# Patient Record
Sex: MALE | Race: ASIAN | NOT HISPANIC OR LATINO | Employment: FULL TIME | URBAN - METROPOLITAN AREA
[De-identification: names, ages, dates, MRNs, and addresses within clinical notes are randomized per-mention and may not be internally consistent; named-entity substitution may affect disease eponyms.]

---

## 2019-02-14 ENCOUNTER — OFFICE VISIT (OUTPATIENT)
Dept: FAMILY MEDICINE | Facility: CLINIC | Age: 44
End: 2019-02-14
Payer: COMMERCIAL

## 2019-02-14 VITALS
HEIGHT: 71 IN | WEIGHT: 191 LBS | BODY MASS INDEX: 26.74 KG/M2 | SYSTOLIC BLOOD PRESSURE: 123 MMHG | DIASTOLIC BLOOD PRESSURE: 79 MMHG | HEART RATE: 88 BPM

## 2019-02-14 DIAGNOSIS — L65.9 ALOPECIA: ICD-10-CM

## 2019-02-14 DIAGNOSIS — R68.82 LOW LIBIDO: ICD-10-CM

## 2019-02-14 DIAGNOSIS — R53.83 FATIGUE, UNSPECIFIED TYPE: Primary | ICD-10-CM

## 2019-02-14 DIAGNOSIS — E53.8 VITAMIN B12 DEFICIENCY: ICD-10-CM

## 2019-02-14 DIAGNOSIS — Z13.220 SCREENING FOR LIPID DISORDERS: ICD-10-CM

## 2019-02-14 PROCEDURE — 99203 PR OFFICE/OUTPT VISIT, NEW, LEVL III, 30-44 MIN: ICD-10-PCS | Mod: ,,, | Performed by: INTERNAL MEDICINE

## 2019-02-14 PROCEDURE — 99203 OFFICE O/P NEW LOW 30 MIN: CPT | Mod: ,,, | Performed by: INTERNAL MEDICINE

## 2019-02-14 RX ORDER — FINASTERIDE 1 MG/1
1 TABLET, FILM COATED ORAL DAILY
Qty: 90 TABLET | Refills: 1 | Status: SHIPPED | OUTPATIENT
Start: 2019-02-14 | End: 2020-02-17 | Stop reason: SDUPTHER

## 2019-02-14 RX ORDER — CYANOCOBALAMIN 1000 UG/ML
1000 INJECTION, SOLUTION INTRAMUSCULAR; SUBCUTANEOUS
Qty: 10 ML | Refills: 1 | Status: SHIPPED | OUTPATIENT
Start: 2019-02-14 | End: 2019-10-02 | Stop reason: SDUPTHER

## 2019-02-14 NOTE — PROGRESS NOTES
Subjective:       Patient ID: Josef Sage is a 43 y.o. male.    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 He has moved from [place] area. He is in administrative role as a Physician and looks forward to found a new opportunity locally in [facility].     Thus far he has not been diagnosed with any major medical issues.    He has been feeling somewhat fatigued and tired for the last few years or so. He did go to some social and personal stressors with one of the family members (cousin) having cancer. Perhaps once a year or so, he takes a vitamin B-12 shot which helps him perk -up.    He also has been experiencing male pattern hair loss which probably comes from his mother's side of family. He wants to give Propecia a trial for hair gain.    He is also concerned about testosterone levels given that he has a somewhat low libido. He is able to get erections. Somewhat less desired and drive. Slight irritability.    No significant weight gain or weight loss. No joint pains arthralgias or myalgias. No rash. No history of major illness in past.    He immigrated to [place] at the age of 7 with his parents and possibly had a positive PPD (BCG vaccination in [place]). He probably had a prophylactic treatment for a certain period of time, I assume with INH and B-6.    Does not experience any fever or has any lymphadenopathy. No hernia or hydrocele. No varicoceles.    He has not had a complete physical and is also interested in checking his lipid panel. One of his cousins had colon cancer as mentioned above and is wondering whether he might be eligible for a colonoscopy. Several years ago he had colonoscopy for some other reasons which was unremarkable.          Fatigue   This is a chronic problem. The current episode started more than 1 year ago. The problem occurs intermittently. The problem has been waxing and waning. Associated symptoms include  fatigue. Pertinent negatives include no abdominal pain, arthralgias, chest pain, chills, congestion, coughing, fever, numbness or rash. The symptoms are aggravated by exertion. Treatments tried: Vitamin B12 Inj Gives relief. The treatment provided mild relief.       Past Medical History:   Diagnosis Date    Bronchitis     Wheezes      Social History     Socioeconomic History    Marital status: Single     Spouse name: Not on file    Number of children: 0    Years of education: Not on file    Highest education level: Not on file   Social Needs    Financial resource strain: Not on file    Food insecurity - worry: Not on file    Food insecurity - inability: Not on file    Transportation needs - medical: Not on file    Transportation needs - non-medical: Not on file   Occupational History    Occupation: Internal medicine physician      Comment: Academics   Tobacco Use    Smoking status: Never Smoker    Smokeless tobacco: Never Used   Substance and Sexual Activity    Alcohol use: Yes     Alcohol/week: 0.6 oz     Types: 1 Glasses of wine per week     Comment: once a week    Drug use: No    Sexual activity: Yes     Partners: Female     Comment: Not    Other Topics Concern    Not on file   Social History Narrative    He works as a  in a healthcare setting in [facility]. His family lives in [facility] and he is considering an opportunity to go to [facility].     Past Surgical History:   Procedure Laterality Date    MOUTH SURGERY      None       Family History   Problem Relation Age of Onset    Hypertension Mother     Hypertension Father        Review of Systems   Constitutional: Positive for fatigue. Negative for activity change, appetite change, chills, fever and unexpected weight change.   HENT: Negative for congestion, postnasal drip, sneezing and trouble swallowing.    Eyes: Negative for pain, itching and visual disturbance.   Respiratory:  "Negative for cough, chest tightness and shortness of breath.    Cardiovascular: Negative for chest pain, palpitations and leg swelling.   Gastrointestinal: Negative for abdominal distention, abdominal pain, blood in stool, constipation and diarrhea.   Endocrine: Negative for cold intolerance, heat intolerance, polydipsia, polyphagia and polyuria.        Somewhat low libido.   Genitourinary: Negative for dysuria, flank pain, hematuria and scrotal swelling.   Musculoskeletal: Negative for arthralgias, back pain and gait problem.   Skin: Negative for pallor, rash and wound.        Male pattern hair baldness   Allergic/Immunologic: Negative for environmental allergies, food allergies and immunocompromised state.   Neurological: Negative for dizziness, seizures, speech difficulty, light-headedness and numbness.   Hematological: Negative for adenopathy. Does not bruise/bleed easily.   Psychiatric/Behavioral: Negative for agitation, behavioral problems, confusion and sleep disturbance. The patient is not nervous/anxious.          Objective:      Blood pressure 123/79, pulse 88, height 5' 11" (1.803 m), weight 86.6 kg (191 lb). Body mass index is 26.64 kg/m².  Physical Exam   Constitutional: He is oriented to person, place, and time. He appears well-developed and well-nourished. No distress.   HENT:   Head: Normocephalic and atraumatic.   Nose: Nose normal.   Mouth/Throat: Oropharynx is clear and moist. No oropharyngeal exudate.   Eyes: Conjunctivae and EOM are normal.   Neck: Normal range of motion. Neck supple. No JVD present. No tracheal deviation present. No thyromegaly present.   Cardiovascular: Normal rate, regular rhythm and normal heart sounds. Exam reveals no gallop and no friction rub.   No murmur heard.  Pulmonary/Chest: Effort normal and breath sounds normal. No respiratory distress. He has no wheezes. He has no rales.   Abdominal: Soft. Bowel sounds are normal. He exhibits no distension. There is no tenderness. "   Musculoskeletal: Normal range of motion.   Neurological: He is alert and oriented to person, place, and time. He has normal reflexes.   Skin: Skin is warm and dry. He is not diaphoretic.   Male pattern receding hair line.   Psychiatric: He has a normal mood and affect.   Nursing note and vitals reviewed.        Assessment:       1. Fatigue, unspecified type    2. Vitamin B12 deficiency    3. Low libido    4. Screening for lipid disorders    5. Alopecia           No visits with results within 3 Month(s) from this visit.   Latest known visit with results is:   Admission on 02/25/2016, Discharged on 02/26/2016   Component Date Value Ref Range Status    WBC 02/25/2016 17.29* 3.90 - 12.70 K/uL Final    RBC 02/25/2016 5.89  4.60 - 6.20 M/uL Final    Hemoglobin 02/25/2016 17.4  14.0 - 18.0 g/dL Final    Hematocrit 02/25/2016 49.1  40.0 - 54.0 % Final    MCV 02/25/2016 83  82 - 98 fL Final    MCH 02/25/2016 29.5  27.0 - 31.0 pg Final    MCHC 02/25/2016 35.4  32.0 - 36.0 % Final    RDW 02/25/2016 12.7  11.5 - 14.5 % Final    Platelets 02/25/2016 247  150 - 350 K/uL Final    MPV 02/25/2016 10.0  9.2 - 12.9 fL Final    Gran # (ANC) 02/25/2016 16.3* 1.8 - 7.7 K/uL Final    Lymph # 02/25/2016 0.8* 1.0 - 4.8 K/uL Final    Mono # 02/25/2016 0.1* 0.3 - 1.0 K/uL Final    Eos # 02/25/2016 0.0  0.0 - 0.5 K/uL Final    Baso # 02/25/2016 0.01  0.00 - 0.20 K/uL Final    Gran% 02/25/2016 94.4* 38.0 - 73.0 % Final    Lymph% 02/25/2016 4.5* 18.0 - 48.0 % Final    Mono% 02/25/2016 0.6* 4.0 - 15.0 % Final    Eosinophil% 02/25/2016 0.1  0.0 - 8.0 % Final    Basophil% 02/25/2016 0.1  0.0 - 1.9 % Final    Differential Method 02/25/2016 Automated   Final    Sodium 02/25/2016 139  136 - 145 mmol/L Final    Potassium 02/25/2016 4.5  3.5 - 5.1 mmol/L Final    Chloride 02/25/2016 103  95 - 110 mmol/L Final    CO2 02/25/2016 29  23 - 29 mmol/L Final    Glucose 02/25/2016 117* 70 - 110 mg/dL Final    BUN, Bld 02/25/2016 13   "6 - 20 mg/dL Final    Creatinine 02/25/2016 1.0  0.5 - 1.4 mg/dL Final    Calcium 02/25/2016 10.3  8.7 - 10.5 mg/dL Final    Anion Gap 02/25/2016 7* 8 - 16 mmol/L Final    eGFR if African American 02/25/2016 >60.0  >60 mL/min/1.73 m^2 Final    eGFR if non African American 02/25/2016 >60.0  >60 mL/min/1.73 m^2 Final    D-Dimer 02/25/2016 <0.19  <0.50 mg/L FEU Final         Plan:           Fatigue, unspecified type  -     Comprehensive metabolic panel; Future; Expected date: 02/14/2019  -     CBC auto differential; Future; Expected date: 02/14/2019    Vitamin B12 deficiency  -     Methylmalonic acid, serum; Future; Expected date: 02/14/2019  -     cyanocobalamin 1,000 mcg/mL injection; Inject 1 mL (1,000 mcg total) into the muscle every 28 days.  Dispense: 10 mL; Refill: 1  -     syringe with needle (SYRINGE 3CC/25GX1") 3 mL 25 gauge x 1" Syrg; 1 each by Misc.(Non-Drug; Combo Route) route every 30 days.  Dispense: 5 Syringe; Refill: 1    Low libido  -     Testosterone, Total & Free & Sex Hormone; Future; Expected date: 02/14/2019    Screening for lipid disorders  -     Lipid panel; Future; Expected date: 02/14/2019    Alopecia  -     PROPECIA 1 mg tablet; Take 1 tablet (1 mg total) by mouth once daily.  Dispense: 90 tablet; Refill: 1      Pt's medical issues have been reviewed. Basic tests including CBC, chemistry, TSH will be ordered for fatigue.    Check total and free testosterone for low libido.    I'm okay with trial of Propecia for male pattern alopecia.    I don't have any objection for him to take vitamin B-12 on empirical basis for fatigue which seems to have happened in past. Would like to check the equal and methylmalonic acid levels.    Cousin brother had colon cancer and will look into screening colonoscopy guidelines.    Between 3-6 months for annual physical complete.    The patient is asked to make an attempt to improve diet and exercise patterns to aid in medical management of this " "problem.    Advised Mr. Sage about age and season appropriate immunizations/ cancer screenings.  Also seasonal influenza vaccine, update on tetanus diphtheria vaccination every 10 years.          Current Outpatient Medications:     albuterol 90 mcg/actuation inhaler, Inhale 1-2 puffs into the lungs every 6 (six) hours as needed for Wheezing., Disp: 1 Inhaler, Rfl: 0    cyanocobalamin 1,000 mcg/mL injection, Inject 1 mL (1,000 mcg total) into the muscle every 28 days., Disp: 10 mL, Rfl: 1    PROPECIA 1 mg tablet, Take 1 tablet (1 mg total) by mouth once daily., Disp: 90 tablet, Rfl: 1    syringe with needle (SYRINGE 3CC/25GX1") 3 mL 25 gauge x 1" Syrg, 1 each by Misc.(Non-Drug; Combo Route) route every 30 days., Disp: 5 Syringe, Rfl: 1  "

## 2019-02-14 NOTE — PATIENT INSTRUCTIONS
Vitamin B-12  Does this test have other names?  VB12, serum cobalamin  What is this test?  This test measures the level of vitamin B-12 in your blood. You need this vitamin to make red blood cells and for your nervous system to function as it should.  You get vitamin B-12 from eating foods that come from animals, such as meat, eggs, and dairy products. Vitamin B-12 is also added to some cereals. You can also take this vitamin as a supplement in pill form.  Why do I need this test?  You may need this test if your healthcare provider suspects that your vitamin B-12 level is low. A low level of vitamin B-12 is called vitamin B-12 deficiency. You are more likely to have vitamin deficiency if you are an older adult, have a digestive disorder called malabsorption, have had gastrointestinal surgery, or eat a vegan diet. Women who are pregnant or breastfeeding and eat a vegetarian-type diet are at high risk for this deficiency.  You may also need this test if you've been diagnosed with or your healthcare provider suspects a disease called pernicious anemia. Pernicious anemia affects the lining of your stomach and makes it hard to absorb vitamin B-12.  These are common symptoms of vitamin B-12 deficiency:  · Fatigue  · Weakness  · Weight loss  · Tingling or numbness of the hands and feet  · Constipation  · Poor balance  · Confusion  · Depression  · Memory loss  · Soreness of the mouth or tongue  What other tests might I have along with this test?  Your healthcare provider may order other tests to help find out the cause of your vitamin B-12 deficiency. These tests may include:  · Complete blood count  · Peripheral blood smear, which involves looking at your blood cells under a microscope  · Folic acid level. This vitamin is also important for red blood cell production.  What do my test results mean?  Many things may affect your lab test results. These include the method each lab uses to do the test. Even if your test  results are different from the normal value, you may not have a problem. To learn what the results mean for you, talk with your healthcare provider.  Vitamin B-12 is measured in picograms per milliliter (pg/mL). Normal results are:  · 200 to 835 pg/mL for adults  · 160 to 1,300 pg/mL for newborns  If your results are low, you may have:  · Pernicious anemia  · Malabsorption from inflammatory bowel disease or other causes  · Poor absorption because of surgery  · Tapeworm infection  · Too little intake of animal protein  · Folic acid deficiency  · Iron deficiency  If your levels are high, you may have:  · Liver or kidney disease  · Diabetes  · Obesity  · White blood cell cancer  High levels may also mean that you have chronic obstructive pulmonary disease , congestive heart failure, or a thickening of the blood called polycythemia vera.  How is this test done?  The test requires a blood sample, which is drawn through a needle from a vein in your arm.  Does this test pose any risks?  Taking a blood sample with a needle carries risks that include bleeding, infection, bruising, or feeling dizzy. When the needle pricks your arm, you may feel a slight stinging sensation or pain. Afterward, the site may be slightly sore.  What might affect my test results?  Certain conditions may affect your test results. These include:  · Pregnancy  · Recent blood transfusions  · Smoking  Medicines in general may also affect your results. Specific medicines include supplements of vitamin A or C and birth control pills.  How do I get ready for this test?  You must fast before this test. You may be able to drink water, but you should not eat or drink anything else after midnight on the night before the test. If you are not having the test in the morning, ask your healthcare provider how long you need to fast before the test. You should not have a vitamin B-12 injection before the test. Also be sure your provider knows about all medicines,  herbs, vitamins, and supplements you are taking. This includes medicines that don't need a prescription and any illicit drugs you may use.  © 6227-4620 [employer]. [address] [place] [address], [place] [address]. All rights reserved. This information is not intended as a substitute for professional medical care. Always follow your healthcare professional's instructions.

## 2019-05-20 PROBLEM — Z13.220 SCREENING FOR LIPID DISORDERS: Status: RESOLVED | Noted: 2019-02-14 | Resolved: 2019-05-20

## 2019-09-16 ENCOUNTER — TELEPHONE (OUTPATIENT)
Dept: FAMILY MEDICINE | Facility: CLINIC | Age: 44
End: 2019-09-16

## 2019-09-16 NOTE — TELEPHONE ENCOUNTER
----- Message from Neel Maldonado MD sent at 9/15/2019  3:15 PM CDT -----  The results are within acceptable range.  Please keep regular follow up.

## 2019-09-17 ENCOUNTER — OFFICE VISIT (OUTPATIENT)
Dept: FAMILY MEDICINE | Facility: CLINIC | Age: 44
End: 2019-09-17
Payer: COMMERCIAL

## 2019-09-17 VITALS
HEART RATE: 94 BPM | SYSTOLIC BLOOD PRESSURE: 122 MMHG | DIASTOLIC BLOOD PRESSURE: 85 MMHG | HEIGHT: 71 IN | BODY MASS INDEX: 27.44 KG/M2 | WEIGHT: 196 LBS

## 2019-09-17 DIAGNOSIS — Z23 FLU VACCINE NEED: ICD-10-CM

## 2019-09-17 DIAGNOSIS — Z00.00 ANNUAL PHYSICAL EXAM: Primary | ICD-10-CM

## 2019-09-17 DIAGNOSIS — R63.5 WEIGHT GAIN: ICD-10-CM

## 2019-09-17 DIAGNOSIS — R19.7 DIARRHEA, UNSPECIFIED TYPE: ICD-10-CM

## 2019-09-17 DIAGNOSIS — E78.2 MIXED HYPERLIPIDEMIA: ICD-10-CM

## 2019-09-17 PROCEDURE — 99999 PR PBB SHADOW E&M-EST. PATIENT-LVL IV: CPT | Mod: PBBFAC,,, | Performed by: INTERNAL MEDICINE

## 2019-09-17 PROCEDURE — 99396 PR PREVENTIVE VISIT,EST,40-64: ICD-10-PCS | Mod: 25,S$GLB,, | Performed by: INTERNAL MEDICINE

## 2019-09-17 PROCEDURE — 90471 IMMUNIZATION ADMIN: CPT | Mod: S$GLB,,, | Performed by: INTERNAL MEDICINE

## 2019-09-17 PROCEDURE — 90471 FLU VACCINE (QUAD) GREATER THAN OR EQUAL TO 3YO PRESERVATIVE FREE IM: ICD-10-PCS | Mod: S$GLB,,, | Performed by: INTERNAL MEDICINE

## 2019-09-17 PROCEDURE — 99213 PR OFFICE/OUTPT VISIT, EST, LEVL III, 20-29 MIN: ICD-10-PCS | Mod: 25,S$GLB,, | Performed by: INTERNAL MEDICINE

## 2019-09-17 PROCEDURE — 90686 FLU VACCINE (QUAD) GREATER THAN OR EQUAL TO 3YO PRESERVATIVE FREE IM: ICD-10-PCS | Mod: S$GLB,,, | Performed by: INTERNAL MEDICINE

## 2019-09-17 PROCEDURE — 99213 OFFICE O/P EST LOW 20 MIN: CPT | Mod: 25,S$GLB,, | Performed by: INTERNAL MEDICINE

## 2019-09-17 PROCEDURE — 90686 IIV4 VACC NO PRSV 0.5 ML IM: CPT | Mod: S$GLB,,, | Performed by: INTERNAL MEDICINE

## 2019-09-17 PROCEDURE — 99396 PREV VISIT EST AGE 40-64: CPT | Mod: 25,S$GLB,, | Performed by: INTERNAL MEDICINE

## 2019-09-17 PROCEDURE — 99999 PR PBB SHADOW E&M-EST. PATIENT-LVL IV: ICD-10-PCS | Mod: PBBFAC,,, | Performed by: INTERNAL MEDICINE

## 2019-09-17 NOTE — PATIENT INSTRUCTIONS
"  Controlling Your Cholesterol  Cholesterol is a waxy substance. It travels in your blood through the blood vessels. When you have high cholesterol, it builds up in the walls of the blood vessels. This makes the vessels narrower. Blood flow decreases. You are then at greater risk for having a heart attack or a stroke.  Good and bad cholesterol  Lipids are fats. Blood is mostly water. Fat and water don't mix. So our bodies need lipoproteins (lipids inside a protein shell) to carry the lipids. The protein shell carries its lipids through the bloodstream. There are two main kinds of lipoproteins:  · LDL (low-density lipoprotein) is known as "bad cholesterol." It mainly carries cholesterol. It delivers this cholesterol to body cells. Excess LDL cholesterol will build up in artery walls. This increases your risk for heart disease and stroke.  · HDL (high-density lipoprotein) is known as "good cholesterol." This protein shell collects excess cholesterol that LDLs have left behind on blood vessel walls. That's why high levels of HDL cholesterol can decrease your risk of heart disease and stroke.  Controlling cholesterol levels  Total cholesterol includes LDL and HDL cholesterol, as well as other fats in the bloodstream. If your total cholesterol is high, follow the steps below to help lower your total cholesterol level:  · Eat less unhealthy fat:  ¨ Cut back on saturated fats and trans (also called hydrogenated) fats by selecting lean cuts of meat, low-fat dairy, and using oils instead of solid fats. Limit baked goods, processed meats, and fried foods. A diet thats high in these fats increases your bad cholesterol. It's not enough to just cut back on foods containing cholesterol.  ¨ Eat about 2 servings of fish per week. Most fish contain omega-3 fatty acids. These help lower blood cholesterol.  ¨ Eat more whole grains and soluble fiber (such as oat bran). These lower overall cholesterol.  · Be active:  ¨ Choose an " activity you enjoy. Walking, swimming, and riding a bike are some good ways to be active.  ¨ Start at a level where you feel comfortable. Increase your time and pace a little each week.  ¨ Work up to 40 minutes of moderate to high intensity physical activity at least 3 to 4 days per week.  ¨ Remember, some activity is better than none.  ¨ If you haven't been exercising regularly, start slowly. Check with your doctor to make sure the exercise plan is right for you.  · Quit smoking. Quitting smoking can improve your lipid levels. It also lowers your risk for heart disease and stroke.  · Weight management. If you are overweight or obese, your health care provider will work with you to lose weight and lower your BMI (body mass index) to a normal or near-normal level. Making diet changes and increasing physical activity can help.  · Take medication as directed. Many people need medication to get their LDL levels to a safe level. Medication to lower cholesterol levels is effective and safe. (But taking medication is not a substitute for exercise or watching your diet!) Your doctor can tell you whether you might benefit from a cholesterol-lowering medication.  Date Last Reviewed: 5/11/2015  © 0589-2122 [employer] [address] [place] [address], [place] [address]. All rights reserved. This information is not intended as a substitute for professional medical care. Always follow your healthcare professional's instructions.        Uncertain Causes of Diarrhea (Adult)    Diarrhea is when stools are loose and watery. This can be caused by:  · Viral infections  · Bacterial infections  · Food poisoning  · Parasites  · Irritable bowel syndrome (IBS)  · Inflammatory bowel diseases such as ulcerative colitis, Crohn's disease, and celiac disease  · Food intolerance, such as to lactose, the sugar found in milk and milk products  · Reaction to medicines like antibiotics, laxatives, cancer drugs, and antacids  Along with  diarrhea, you may also have:  · Abdominal pain and cramping  · Nausea and vomiting  · Loss of bowel control  · Fever and chills  · Bloody stools  In some cases, antibiotics may help to treat diarrhea. You may have a stool sample test. This is done to see what is causing your diarrhea, and if antibiotics will help treat it. The results of a stool sample test may take up to 2 days. The healthcare provider may not give you antibiotics until he or she has the stool test results.  Diarrhea can cause dehydration. This is the loss of too much water and other fluids from the body. When this occurs, body fluid must be replaced. This can be done with oral rehydration solutions. Oral rehydration solutions are available at drugstores and grocery stores without a prescription.  Home care  Follow all instructions given by your healthcare provider. Rest at home for the next 24 hours, or until you feel better. Avoid caffeine, tobacco, and alcohol. These can make diarrhea, cramping, and pain worse.  If taking medicines:  · Dont take over-the-counter diarrhea or nausea medicines unless your healthcare provider tells you to.  · You may use acetaminophen or NSAID medicines like ibuprofen or naproxen to reduce pain and fever. Dont use these if you have chronic liver or kidney disease, or ever had a stomach ulcer or gastrointestinal bleeding. Don't use NSAID medicines if you are already taking one for another condition (like arthritis) or are on daily aspirin therapy (such as for heart disease or after a stroke). Talk with your healthcare provider first.  · If antibiotics were prescribed, be sure you take them until they are finished. Dont stop taking them even when you feel better. Antibiotics must be taken as a full course.  To prevent the spread of illness:  · Remember that washing with soap and water and using alcohol-based  is the best way to prevent the spread of infection.  · Clean the toilet after each use.  · Wash  your hands before eating.  · Wash your hands before and after preparing food. Keep in mind that people with diarrhea or vomiting should not prepare food for others.  · Wash your hands after using cutting boards, countertops, and knives that have been in contact with raw foods.  · Wash and then peel fruits and vegetables.  · Keep uncooked meats away from cooked and ready-to-eat foods.  · Use a food thermometer when cooking. Cook poultry to at least 165°F (74°C). Cook ground meat (beef, veal, pork, lamb) to at least 160°F (71°C). Cook fresh beef, veal, lamb, and pork to at least 145°F (63°C).  · Dont eat raw or undercooked eggs (poached or juice side up), poultry, meat, or unpasteurized milk and juices.  Food and drinks  The main goal while treating vomiting or diarrhea is to prevent dehydration. This is done by taking small amounts of liquids often.  · Keep in mind that liquids are more important than food right now.  · Drink only small amounts of liquids at a time.  · Dont force yourself to eat, especially if you are having cramping, vomiting, or diarrhea. Dont eat large amounts at a time, even if you are hungry.  · If you eat, avoid fatty, greasy, spicy, or fried foods.  · Dont eat dairy foods or drink milk if you have diarrhea. These can make diarrhea worse.  During the first 24 hours you can try:  · Oral rehydration solutions. Do not use sports drinks. They have too much sugar and not enough electrolytes.  · Soft drinks without caffeine  · Ginger ale  · Water (plain or flavored)  · Decaf tea or coffee  · Clear broth, consommé, or bouillon  · Gelatin, popsicles, or frozen fruit juice bars  The second 24 hours, if you are feeling better, you can add:  · Hot cereal, plain toast, bread, rolls, or crackers  · Plain noodles, rice, mashed potatoes, chicken noodle soup, or rice soup  · Unsweetened canned fruit (no pineapple)  · Bananas  As you recover:  · Limit fat intake to less than 15 grams per day. Dont eat  margarine, butter, oils, mayonnaise, sauces, gravies, fried foods, peanut butter, meat, poultry, or fish.  · Limit fiber. Dont eat raw or cooked vegetables, fresh fruits except bananas, or bran cereals.  · Limit caffeine and chocolate.  · Limit dairy.  · Dont use spices or seasonings except salt.  · Go back to your normal diet over time, as you feel better and your symptoms improve.  · If the symptoms come back, go back to a simple diet or clear liquids.  Follow-up care  Follow up with your healthcare provider, or as advised. If a stool sample was taken or cultures were done, call the healthcare provider for the results as instructed.  Call 911  Call 911 if you have any of these symptoms:  · Trouble breathing  · Confusion  · Extreme drowsiness or trouble walking  · Loss of consciousness  · Rapid heart rate  · Chest pain  · Stiff neck  · Seizure  When to seek medical advice  Call your healthcare provider right away if any of these occur:  · Abdominal pain that gets worse  · Constant lower right abdominal pain  · Continued vomiting and inability to keep liquids down  · Diarrhea more than 5 times a day  · Blood in vomit or stool  · Dark urine or no urine for 8 hours, dry mouth and tongue, tiredness, weakness, or dizziness  · Drowsiness  · New rash  · You dont get better in 2 to 3 days  · Fever of 100.4°F (38°C) or higher that doesnt get lower with medicine  Date Last Reviewed: 1/3/2016  © 5907-1778 [employer] [address] [place] [address], [place] [address]. All rights reserved. This information is not intended as a substitute for professional medical care. Always follow your healthcare professional's instructions.        Prevention Guidelines, Men Ages 40 to 49  Screening tests and vaccines are an important part of managing your health. Health counseling is essential, too. Below are guidelines for these, for men ages 40 to 49. Talk with your healthcare provider to make sure youre up to date on what you  need.  Screening Who needs it How often   Alcohol misuse All men in this age group At routine exams   Blood pressure All men in this age group Every 2 years if your blood pressure reading is less than 120/80 mm Hg; yearly if your systolic blood pressure reading is 120 to 139 mm Hg, or your diastolic blood pressure reading is 80 to 89 mm Hg   Depression All men in this age group At routine exams   Type 2 diabetes or prediabetes All adults beginning at age 45 and adults without symptoms at any age who are overweight or obese and have 1 or more other risk factors for diabetes At least every 3 years (yearly if blood sugar has begun to rise)   Hepatitis C Men at increased risk for infection - talk with your healthcare provider At routine exams   High cholesterol or triglycerides All men ages 35 and older, and younger men at high risk for coronary artery disease At least every 5 years   HIV All men At routine exams   Obesity All men in this age group At routine exams   Prostate cancer Starting at age 45, talk to healthcare provider about risks and benefits of digital rectal exam (JONO) and prostate-specific antigen (PSA) screening1 At routine exams   Syphilis Men at increased risk for infection - talk with your healthcare provider At routine exams   Tuberculosis Men at increased risk for infection - talk with your healthcare provider Check with your healthcare provider   Vision All men in this age group Every 2 to 4 years if no risk factors for eye disease2   Vaccine Who needs it How often   Chickenpox (varicella) All men in this age group who have no record of this infection or vaccine 2 doses; the second dose should be given at least 4 weeks after the first dose   Hepatitis A Men at increased risk for infection - talk with your healthcare provider 2 doses given at least 6 months apart   Hepatitis B Men at increased risk for infection - talk with your healthcare provider 3 doses over 6 months; second dose should be given  1 month after the first dose; the third dose should be given at least 2 months after the second dose and at least 4 months after the first dose   Haemophilus influenzae Type B (HIB) Men at increased risk for infection - talk with your healthcare provider 1 to 3 doses   Influenza (flu) All men in this age group Once a year   Measles, mumps, rubella (MMR) All men in this age group who have no record of these infections or vaccines 1 or 2 doses   Meningococcal Men at increased risk for infection - talk with your healthcare provider 1 or more doses   Pneumococcal conjugate vaccine (PCV13) and pneumococcal polysaccharide vaccine (PPSV23) Men at increased risk for infection - talk with your healthcare provider PCV13: 1 dose ages 19 to 65 (protects against 13 types of pneumococcal bacteria)     PPSV23: 1 to 2 doses through age 64, or 1 dose at 65 or older (protects against 23 types of pneumococcal bacteria)      Tetanus/diphtheria/  pertussis (Td/Tdap) booster All men in this age group Td every 10 years, or a one-time dose of Tdap instead of a Td booster after age 18, then Td every 10 years   Counseling Who needs it How often   Diet and exercise Men who are overweight or obese When diagnosed, and then at routine exams   Sexually transmitted infection prevention Men at increased risk for infection - talk with your healthcare provider At routine exams   Use of daily aspirin Men ages 45 to 79 at risk for cardiovascular health problems At routine exams   Use of tobacco and the health effects it can cause All men in this age group Every exam   [address] [place] [address] [facility]   [facility] [employer] [facility]  Date Last Reviewed: 2/1/2017  © 3033-4844 [employer] [address] [place] [address], [place] [address]. All rights reserved. This information is not intended as a substitute for professional medical care. Always follow your healthcare professional's instructions.

## 2019-09-17 NOTE — PROGRESS NOTES
Subjective:       Patient ID: Josef Sage is a 43 y.o. male.    Chief Complaint: Annual Exam (lab review )    Mr. Josef Sage is a 43-year-old Northern Irish American male who comes for follow-up.  This will be annual physical plus medical issues of concern.    He has hair loss for which she takes Propecia.  In past he was determined to have abnormal lipids and he would like to repeat those labs again.  He also had history of fatigue for which he was supposed to take vitamin B12 injection which she never filled in past.    He is single and is sometimes in relationship with female partners.  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  Nonsmoker and social occasional alcohol.  No illicit substance abuse.  He drives carefully.  There is no recent foreign trips.  In his usual work environment he is not exposed to dust, chemicals or hazardous activities.    Family history has been noted.    Tdap in last 10 yrs-needs confirmation.    Is associated medical issues today also include on and off diarrhea for several months.  Of late he has started finding interest in she has any eats a lot of it.  It is unclear if he might have some dairy intolerance.  Or lactose intolerance.  He is concerned about 1 of his friends was diagnosed with colon cancer and would like to have a colonoscopy or at least a GI evaluation.  Please note that there is no immediate family members having colon cancer.          Diarrhea    This is a new problem. The current episode started more than 1 month ago. The problem occurs 2 to 4 times per day. The problem has been unchanged. Pertinent negatives include no abdominal pain, arthralgias, chills, coughing, fever, increased  flatus, sweats or URI. The symptoms are aggravated by stress. There are no known risk factors. He has tried nothing for the symptoms. The treatment provided no relief. There is no history of bowel resection, inflammatory bowel disease,  irritable bowel syndrome, malabsorption, a recent abdominal surgery or short gut syndrome.   Hyperlipidemia   This is a chronic problem. The current episode started more than 1 year ago. He has no history of hypothyroidism, liver disease or obesity. Pertinent negatives include no chest pain, focal weakness, leg pain or shortness of breath.       Past Medical History:   Diagnosis Date    Bronchitis     Mixed hyperlipidemia 9/17/2019    Wheezes      Social History     Socioeconomic History    Marital status: Single     Spouse name: Not on file    Number of children: 0    Years of education: Not on file    Highest education level: Not on file   Occupational History    Occupation: Internal medicine physician      Comment: Academics   Social Needs    Financial resource strain: Not on file    Food insecurity:     Worry: Not on file     Inability: Not on file    Transportation needs:     Medical: Not on file     Non-medical: Not on file   Tobacco Use    Smoking status: Never Smoker    Smokeless tobacco: Never Used   Substance and Sexual Activity    Alcohol use: Yes     Alcohol/week: 0.6 oz     Types: 1 Glasses of wine per week     Comment: once a week    Drug use: No    Sexual activity: Yes     Partners: Female     Comment: Not    Lifestyle    Physical activity:     Days per week: Not on file     Minutes per session: Not on file    Stress: Not at all   Relationships    Social connections:     Talks on phone: Not on file     Gets together: Not on file     Attends Hoahaoism service: Not on file     Active member of club or organization: Not on file     Attends meetings of clubs or organizations: Not on file     Relationship status: Not on file   Other Topics Concern    Not on file   Social History Narrative    He works as a  in a healthcare setting in [employer]. His family lives in [place] and he is considering an opportunity to go to [facility] [employer].  "    Past Surgical History:   Procedure Laterality Date    MOUTH SURGERY      None       Family History   Problem Relation Age of Onset    Hypertension Mother     Hypertension Father        Review of Systems   Constitutional: Negative for activity change, appetite change, chills, fatigue, fever and unexpected weight change.   HENT: Negative for congestion, postnasal drip, sneezing and trouble swallowing.    Eyes: Negative for pain, itching and visual disturbance.   Respiratory: Negative for cough, chest tightness and shortness of breath.    Cardiovascular: Negative for chest pain, palpitations and leg swelling.        Hyperlipidemia   Gastrointestinal: Positive for diarrhea. Negative for abdominal distention, abdominal pain, blood in stool, constipation and flatus.   Endocrine: Negative for cold intolerance, heat intolerance, polydipsia, polyphagia and polyuria.        Somewhat low libido.   Genitourinary: Negative for dysuria, flank pain, hematuria and scrotal swelling.   Musculoskeletal: Negative for arthralgias, back pain and gait problem.   Skin: Negative for pallor, rash and wound.        Male pattern hair baldness   Allergic/Immunologic: Negative for environmental allergies, food allergies and immunocompromised state.   Neurological: Negative for dizziness, focal weakness, seizures, speech difficulty, light-headedness and numbness.   Hematological: Negative for adenopathy. Does not bruise/bleed easily.   Psychiatric/Behavioral: Negative for agitation, behavioral problems, confusion and sleep disturbance. The patient is not nervous/anxious.          Objective:      Blood pressure 122/85, pulse 94, height 5' 11" (1.803 m), weight 88.9 kg (196 lb). Body mass index is 27.34 kg/m².  Physical Exam   Constitutional: He is oriented to person, place, and time. He appears well-developed and well-nourished. No distress.   HENT:   Head: Normocephalic and atraumatic.   Nose: Nose normal.   Mouth/Throat: Oropharynx is " clear and moist. No oropharyngeal exudate.   Eyes: Conjunctivae and EOM are normal.   Neck: Normal range of motion. Neck supple. No JVD present. No tracheal deviation present. No thyromegaly present.   Cardiovascular: Normal rate, regular rhythm and normal heart sounds. Exam reveals no gallop and no friction rub.   No murmur heard.  Pulmonary/Chest: Effort normal and breath sounds normal. No respiratory distress. He has no wheezes. He has no rales.   Abdominal: Soft. Bowel sounds are normal. He exhibits no distension. There is no tenderness.   Musculoskeletal: Normal range of motion.   Neurological: He is alert and oriented to person, place, and time. He has normal reflexes.   Skin: Skin is warm and dry. He is not diaphoretic.   Male pattern receding hair line.   Psychiatric: He has a normal mood and affect.   Nursing note and vitals reviewed.        Assessment:       1. Annual physical exam    2. Mixed hyperlipidemia    3. Diarrhea, unspecified type    4. Flu vaccine need    5. Weight gain           No visits with results within 3 Month(s) from this visit.   Latest known visit with results is:   Office Visit on 02/14/2019   Component Date Value Ref Range Status    Glucose 09/14/2019 93  65 - 99 mg/dL Final    BUN, Bld 09/14/2019 13  7 - 25 mg/dL Final    Creatinine 09/14/2019 1.02  0.60 - 1.35 mg/dL Final    eGFR if non African American 09/14/2019 91  > OR = 60 mL/min/1.73m2 Final    eGFR if African American 09/14/2019 105  > OR = 60 mL/min/1.73m2 Final    BUN/Creatinine Ratio 09/14/2019 NOT APPLICABLE  6 - 22 (calc) Final    Sodium 09/14/2019 139  135 - 146 mmol/L Final    Potassium 09/14/2019 4.4  3.5 - 5.3 mmol/L Final    Chloride 09/14/2019 104  98 - 110 mmol/L Final    CO2 09/14/2019 26  20 - 32 mmol/L Final    Calcium 09/14/2019 9.3  8.6 - 10.3 mg/dL Final    Total Protein 09/14/2019 6.9  6.1 - 8.1 g/dL Final    Albumin 09/14/2019 4.4  3.6 - 5.1 g/dL Final    Globulin, Total 09/14/2019 2.5  1.9  - 3.7 g/dL (calc) Final    Albumin/Globulin Ratio 09/14/2019 1.8  1.0 - 2.5 (calc) Final    Total Bilirubin 09/14/2019 0.5  0.2 - 1.2 mg/dL Final    Alkaline Phosphatase 09/14/2019 75  40 - 115 U/L Final    AST 09/14/2019 18  10 - 40 U/L Final    ALT 09/14/2019 29  9 - 46 U/L Final    WBC 09/14/2019 6.7  3.8 - 10.8 Thousand/uL Final    RBC 09/14/2019 5.12  4.20 - 5.80 Million/uL Final    Hemoglobin 09/14/2019 14.6  13.2 - 17.1 g/dL Final    Hematocrit 09/14/2019 43.8  38.5 - 50.0 % Final    Mean Corpuscular Volume 09/14/2019 85.5  80.0 - 100.0 fL Final    Mean Corpuscular Hemoglobin 09/14/2019 28.5  27.0 - 33.0 pg Final    Mean Corpuscular Hemoglobin Conc 09/14/2019 33.3  32.0 - 36.0 g/dL Final    RDW 09/14/2019 12.9  11.0 - 15.0 % Final    Platelets 09/14/2019 257  140 - 400 Thousand/uL Final    MPV 09/14/2019 9.9  7.5 - 12.5 fL Final    Neutrophils Absolute 09/14/2019 3,377  1,500 - 7,800 cells/uL Final    Lymph # 09/14/2019 2,479  850 - 3,900 cells/uL Final    Mono # 09/14/2019 570  200 - 950 cells/uL Final    Eos # 09/14/2019 228  15 - 500 cells/uL Final    Baso # 09/14/2019 47  0 - 200 cells/uL Final    Neutrophils Relative 09/14/2019 50.4  % Final    Lymph% 09/14/2019 37.0  % Final    Mono% 09/14/2019 8.5  % Final    Eosinophil% 09/14/2019 3.4  % Final    Basophil% 09/14/2019 0.7  % Final    Cholesterol 09/14/2019 253* <200 mg/dL Final    HDL 09/14/2019 39* >40 mg/dL Final    Triglycerides 09/14/2019 311* <150 mg/dL Final    LDL Cholesterol 09/14/2019 166* mg/dL (calc) Final    Hdl/Cholesterol Ratio 09/14/2019 6.5* <5.0 (calc) Final    Non HDL Chol. (LDL+VLDL) 09/14/2019 214* <130 mg/dL (calc) Final         Plan:           Annual physical exam    Mixed hyperlipidemia  -     Cancel: Lipid panel; Future; Expected date: 09/17/2019  -     Lipid panel; Future; Expected date: 12/17/2019    Diarrhea, unspecified type  -     Ambulatory consult to Gastroenterology  -     Stool  Exam-Ova,Cysts,Parasites; Future; Expected date: 09/17/2019  -     Stool culture; Future; Expected date: 09/17/2019  -     WBC, Stool; Future; Expected date: 09/17/2019  -     Giardia / Cryptosporidum, EIA; Future; Expected date: 09/17/2019  -     Calprotectin; Future; Expected date: 09/17/2019  -     TSH; Future; Expected date: 09/17/2019    Flu vaccine need  -     Influenza - Quadrivalent (PF)    Weight gain  -     TSH; Future; Expected date: 09/17/2019     Patient presents with reasonably good annual physical examination and stable blood pressure.  His BMI slightly elevated at 27.  He has been advised to watch his diet and cut down on the cheese that he consumes fervently.    He did have dyslipidemia and past and this will be checked again.  He will try to watch his diet and manages weight.  Lipid panel will be checked in 3 months time.    New complains of diarrhea has been noted and I suspect this might be IBS or change in diet.  Possibility of lactose intolerance cannot be completely ruled out.  His concern for having cancer has been appreciated though the possibility in risk is somewhat on the low side.  I will obtain a GI evaluation and workup for stool prior to that.    He has gained some weight and I will also check a TSH just to be sure that hyperthyroidism is not a cause of his diarrhea.    Besides the annual physical, issues of diarrhea and hyperlipidemia were also addressed separately and distinctly.    Although I would like to see him back in 3-6 months, he will prefer to set up an appointment in about a year time.      Current Outpatient Medications:     albuterol 90 mcg/actuation inhaler, Inhale 1-2 puffs into the lungs every 6 (six) hours as needed for Wheezing., Disp: 1 Inhaler, Rfl: 0    PROPECIA 1 mg tablet, Take 1 tablet (1 mg total) by mouth once daily., Disp: 90 tablet, Rfl: 1    cyanocobalamin 1,000 mcg/mL injection, Inject 1 mL (1,000 mcg total) into the muscle every 28 days., Disp: 10  "mL, Rfl: 1    syringe with needle (SYRINGE 3CC/25GX1") 3 mL 25 gauge x 1" Syrg, 1 each by Misc.(Non-Drug; Combo Route) route every 30 days., Disp: 5 Syringe, Rfl: 1  "

## 2019-09-18 LAB
ALBUMIN SERPL-MCNC: 4.4 G/DL (ref 3.6–5.1)
ALBUMIN/GLOB SERPL: 1.8 (CALC) (ref 1–2.5)
ALP SERPL-CCNC: 75 U/L (ref 40–115)
ALT SERPL-CCNC: 29 U/L (ref 9–46)
AST SERPL-CCNC: 18 U/L (ref 10–40)
BASOPHILS # BLD AUTO: 47 CELLS/UL (ref 0–200)
BASOPHILS NFR BLD AUTO: 0.7 %
BILIRUB SERPL-MCNC: 0.5 MG/DL (ref 0.2–1.2)
BUN SERPL-MCNC: 13 MG/DL (ref 7–25)
BUN/CREAT SERPL: NORMAL (CALC) (ref 6–22)
CALCIUM SERPL-MCNC: 9.3 MG/DL (ref 8.6–10.3)
CHLORIDE SERPL-SCNC: 104 MMOL/L (ref 98–110)
CHOLEST SERPL-MCNC: 253 MG/DL
CHOLEST/HDLC SERPL: 6.5 (CALC)
CO2 SERPL-SCNC: 26 MMOL/L (ref 20–32)
CREAT SERPL-MCNC: 1.02 MG/DL (ref 0.6–1.35)
EOSINOPHIL # BLD AUTO: 228 CELLS/UL (ref 15–500)
EOSINOPHIL NFR BLD AUTO: 3.4 %
ERYTHROCYTE [DISTWIDTH] IN BLOOD BY AUTOMATED COUNT: 12.9 % (ref 11–15)
GFRSERPLBLD MDRD-ARVRAT: 91 ML/MIN/1.73M2
GLOBULIN SER CALC-MCNC: 2.5 G/DL (CALC) (ref 1.9–3.7)
GLUCOSE SERPL-MCNC: 93 MG/DL (ref 65–99)
HCT VFR BLD AUTO: 43.8 % (ref 38.5–50)
HDLC SERPL-MCNC: 39 MG/DL
HGB BLD-MCNC: 14.6 G/DL (ref 13.2–17.1)
LDLC SERPL CALC-MCNC: 166 MG/DL (CALC)
LYMPHOCYTES # BLD AUTO: 2479 CELLS/UL (ref 850–3900)
LYMPHOCYTES NFR BLD AUTO: 37 %
MCH RBC QN AUTO: 28.5 PG (ref 27–33)
MCHC RBC AUTO-ENTMCNC: 33.3 G/DL (ref 32–36)
MCV RBC AUTO: 85.5 FL (ref 80–100)
METHYLMALONATE SERPL-SCNC: 170 NMOL/L (ref 87–318)
MONOCYTES # BLD AUTO: 570 CELLS/UL (ref 200–950)
MONOCYTES NFR BLD AUTO: 8.5 %
NEUTROPHILS # BLD AUTO: 3377 CELLS/UL (ref 1500–7800)
NEUTROPHILS NFR BLD AUTO: 50.4 %
NONHDLC SERPL-MCNC: 214 MG/DL (CALC)
PLATELET # BLD AUTO: 257 THOUSAND/UL (ref 140–400)
PMV BLD REES-ECKER: 9.9 FL (ref 7.5–12.5)
POTASSIUM SERPL-SCNC: 4.4 MMOL/L (ref 3.5–5.3)
PROT SERPL-MCNC: 6.9 G/DL (ref 6.1–8.1)
RBC # BLD AUTO: 5.12 MILLION/UL (ref 4.2–5.8)
SHBG SERPL-SCNC: 24 NMOL/L (ref 10–50)
SODIUM SERPL-SCNC: 139 MMOL/L (ref 135–146)
TESTOST FREE SERPL-MCNC: 65.5 PG/ML (ref 35–155)
TESTOST SERPL-MCNC: 316 NG/DL (ref 250–1100)
TRIGL SERPL-MCNC: 311 MG/DL
WBC # BLD AUTO: 6.7 THOUSAND/UL (ref 3.8–10.8)

## 2019-09-19 ENCOUNTER — TELEPHONE (OUTPATIENT)
Dept: FAMILY MEDICINE | Facility: CLINIC | Age: 44
End: 2019-09-19

## 2019-09-19 NOTE — TELEPHONE ENCOUNTER
----- Message from Neel Maldonado MD sent at 9/18/2019  7:08 PM CDT -----  Results are somewhat abnormal. Please keep regular follow up.

## 2019-09-23 ENCOUNTER — PATIENT MESSAGE (OUTPATIENT)
Dept: FAMILY MEDICINE | Facility: CLINIC | Age: 44
End: 2019-09-23

## 2019-09-23 DIAGNOSIS — E53.8 VITAMIN B12 DEFICIENCY: Primary | ICD-10-CM

## 2019-10-02 DIAGNOSIS — E53.8 VITAMIN B12 DEFICIENCY: ICD-10-CM

## 2019-10-02 RX ORDER — CYANOCOBALAMIN 1000 UG/ML
1000 INJECTION, SOLUTION INTRAMUSCULAR; SUBCUTANEOUS
Qty: 10 ML | Refills: 1 | Status: SHIPPED | OUTPATIENT
Start: 2019-10-02 | End: 2020-12-23

## 2019-10-02 RX ORDER — CYANOCOBALAMIN 1000 UG/ML
1000 INJECTION, SOLUTION INTRAMUSCULAR; SUBCUTANEOUS
Qty: 10 ML | Refills: 1 | Status: SHIPPED | OUTPATIENT
Start: 2019-10-02 | End: 2019-10-02 | Stop reason: SDUPTHER

## 2019-12-23 PROBLEM — Z00.00 ANNUAL PHYSICAL EXAM: Status: RESOLVED | Noted: 2019-02-14 | Resolved: 2019-12-23

## 2020-02-17 DIAGNOSIS — L65.9 ALOPECIA: ICD-10-CM

## 2020-02-17 RX ORDER — FINASTERIDE 1 MG/1
1 TABLET, FILM COATED ORAL DAILY
Qty: 90 TABLET | Refills: 1 | Status: SHIPPED | OUTPATIENT
Start: 2020-02-17 | End: 2021-04-23

## 2020-12-22 ENCOUNTER — TELEPHONE (OUTPATIENT)
Dept: FAMILY MEDICINE | Facility: CLINIC | Age: 45
End: 2020-12-22

## 2020-12-22 DIAGNOSIS — R68.82 LOW LIBIDO: Primary | ICD-10-CM

## 2020-12-23 ENCOUNTER — TELEPHONE (OUTPATIENT)
Dept: CARDIOLOGY | Facility: CLINIC | Age: 45
End: 2020-12-23

## 2020-12-23 ENCOUNTER — OFFICE VISIT (OUTPATIENT)
Dept: CARDIOLOGY | Facility: CLINIC | Age: 45
End: 2020-12-23
Payer: COMMERCIAL

## 2020-12-23 VITALS
OXYGEN SATURATION: 100 % | BODY MASS INDEX: 28.56 KG/M2 | SYSTOLIC BLOOD PRESSURE: 120 MMHG | WEIGHT: 204 LBS | HEART RATE: 98 BPM | DIASTOLIC BLOOD PRESSURE: 80 MMHG | HEIGHT: 71 IN

## 2020-12-23 DIAGNOSIS — G47.33 OSA (OBSTRUCTIVE SLEEP APNEA): ICD-10-CM

## 2020-12-23 DIAGNOSIS — R55 SYNCOPE AND COLLAPSE: Primary | ICD-10-CM

## 2020-12-23 DIAGNOSIS — E34.9 TESTOSTERONE INSUFFICIENCY: ICD-10-CM

## 2020-12-23 DIAGNOSIS — F32.A DEPRESSION, UNSPECIFIED DEPRESSION TYPE: ICD-10-CM

## 2020-12-23 DIAGNOSIS — E03.4 HYPOTHYROIDISM DUE TO ACQUIRED ATROPHY OF THYROID: ICD-10-CM

## 2020-12-23 DIAGNOSIS — R53.83 FATIGUE, UNSPECIFIED TYPE: ICD-10-CM

## 2020-12-23 DIAGNOSIS — R68.82 LOW LIBIDO: ICD-10-CM

## 2020-12-23 DIAGNOSIS — K58.9 IRRITABLE BOWEL SYNDROME, UNSPECIFIED TYPE: ICD-10-CM

## 2020-12-23 PROCEDURE — 99204 OFFICE O/P NEW MOD 45 MIN: CPT | Mod: S$GLB,,, | Performed by: SPECIALIST

## 2020-12-23 PROCEDURE — 93000 EKG 12-LEAD: ICD-10-PCS | Mod: S$GLB,,, | Performed by: INTERNAL MEDICINE

## 2020-12-23 PROCEDURE — 99204 PR OFFICE/OUTPT VISIT, NEW, LEVL IV, 45-59 MIN: ICD-10-PCS | Mod: S$GLB,,, | Performed by: SPECIALIST

## 2020-12-23 PROCEDURE — 93000 ELECTROCARDIOGRAM COMPLETE: CPT | Mod: S$GLB,,, | Performed by: INTERNAL MEDICINE

## 2020-12-23 NOTE — LETTER
December 23, 2020      Neel Maldonado MD  [address] [facility]  [address]  [place] [address]           [facility] [place]  [address],   [place] [address]  Phone: [phone]  Fax: [phone]          Patient: Josef Sage   MR Number: [id number]   YOB: 1975   Date of Visit: 12/23/2020       Dear Dr. Neel Maldonado:    Thank you for referring Josef Sage to me for evaluation. Attached you will find relevant portions of my assessment and plan of care.    If you have questions, please do not hesitate to call me. I look forward to following Josef Sage along with you.    Sincerely,    Scotty Turpin MD    Enclosure  CC:  No Recipients    If you would like to receive this communication electronically, please contact [email or web address] or [phone] to request more information on [employer] Link access.    For providers and/or their staff who would like to refer a patient to [facility], please contact us through our one-stop-shop provider referral line, [facility], at [phone].    If you feel you have received this communication in error or would no longer like to receive these types of communications, please e-mail [email or web address]

## 2020-12-23 NOTE — PROGRESS NOTES
Subjective:    Patient ID:  Josef Sage is a 45 y.o. male who presents for   Loss of Consciousness (last one was last year), Fatigue, and Weight Gain    HPI1) passing  10/542526 - micturation syncope   At work  Post exertinon ( runnning up  Stairs-  ckd  Pulse ok    exertion = sweat  Breaks out sweat  On    bioitn   2) finesteride weight gain   3) fatigue- works out and weak  Cant run without  + sob   And lexapro 1.5  Years-  For depression   4) sex active -    5) chronically depressed not suicidal -   Smoke  Rare  Drink   cbd oil    not        Review of patient's allergies indicates:   Allergen Reactions    Sulfa (sulfonamide antibiotics) Rash       Past Medical History:   Diagnosis Date    Bronchitis     Mixed hyperlipidemia 9/17/2019    Wheezes      Past Surgical History:   Procedure Laterality Date    MOUTH SURGERY      None       Social History     Tobacco Use    Smoking status: Never Smoker    Smokeless tobacco: Never Used   Substance Use Topics    Alcohol use: Yes     Alcohol/week: 1.0 standard drinks     Types: 1 Glasses of wine per week     Comment: once a week    Drug use: No        Review of Systems     Review of Systems   Constitution: Positive for malaise/fatigue, night sweats and weight gain.        Sedentary    HENT: Negative.    Eyes: Negative.    Cardiovascular: Negative for chest pain, irregular heartbeat and leg swelling.   Respiratory: Positive for sleep disturbances due to breathing and snoring.    Skin: Negative.    Gastrointestinal:        Ibs    Yogurt and probiotis    Genitourinary: Negative.  Negative for decreased libido.   Neurological:        C6 hernia    Psychiatric/Behavioral: Positive for altered mental status and depression.           Objective:        Vitals:    12/23/20 1542   BP: 120/80   Pulse: 98       Lab Results   Component Value Date    WBC 6.7 09/14/2019    HGB 14.6 09/14/2019    HCT 43.8 09/14/2019     09/14/2019    CHOL 253 (H) 09/14/2019    TRIG  "311 (H) 09/14/2019    HDL 39 (L) 09/14/2019    ALT 29 09/14/2019    AST 18 09/14/2019     09/14/2019    K 4.4 09/14/2019     09/14/2019    CREATININE 1.02 09/14/2019    BUN 13 09/14/2019    CO2 26 09/14/2019        ECHOCARDIOGRAM RESULTS  No results found for this or any previous visit.    CURRENT/PREVIOUS VISIT EKG    No results found for this or any previous visit.  No results found for this or any previous visit.    PHYSICAL EXAM    Physical Exam blood pressure is 120/80  And neck no bruit  Lungs are clear  Cardiac sounds normal  Abdomen extremities okay  Good pulses noted    Medication List with Changes/Refills   Current Medications    ALBUTEROL 90 MCG/ACTUATION INHALER    Inhale 1-2 puffs into the lungs every 6 (six) hours as needed for Wheezing.    PROPECIA 1 MG TABLET    Take 1 tablet (1 mg total) by mouth once daily.    SYRINGE WITH NEEDLE 3 ML 25 GAUGE X 1" SYRG    1 Syringe by Misc.(Non-Drug; Combo Route) route every 28 days.   Discontinued Medications    CYANOCOBALAMIN 1,000 MCG/ML INJECTION    Inject 1 mL (1,000 mcg total) into the muscle every 28 days.           Assessment:     x-ray shin syncope, of fatigue  1. Syncope and collapse     Hypo testosterone ; chest pain     Plan:   Get full labs plus testosterone level left stress test and echo and Holter because of palpitations and syncope    Problem List Items Addressed This Visit     None      Visit Diagnoses     Syncope and collapse    -  Primary    Relevant Orders    IN OFFICE EKG 12-LEAD (to [facility]ida)           No follow-ups on file.     "

## 2020-12-23 NOTE — TELEPHONE ENCOUNTER
----- Message from Chato Hernadez sent at 12/23/2020  9:23 AM CST -----  Regarding: NP -Syncope  Referred by Dr. Maldonado   Pt wants to be seen today   Having syncope   Sweating a lot   Has had 3 syncope episodes over this past year       [phone]

## 2021-03-12 ENCOUNTER — PATIENT MESSAGE (OUTPATIENT)
Dept: FAMILY MEDICINE | Facility: CLINIC | Age: 46
End: 2021-03-12

## 2021-03-12 DIAGNOSIS — J06.9 UPPER RESPIRATORY TRACT INFECTION, UNSPECIFIED TYPE: Primary | ICD-10-CM

## 2021-03-12 RX ORDER — AMOXICILLIN 500 MG/1
500 CAPSULE ORAL EVERY 8 HOURS
Qty: 21 CAPSULE | Refills: 0 | Status: SHIPPED | OUTPATIENT
Start: 2021-03-12 | End: 2023-03-22

## 2021-03-25 ENCOUNTER — PATIENT MESSAGE (OUTPATIENT)
Dept: FAMILY MEDICINE | Facility: CLINIC | Age: 46
End: 2021-03-25

## 2021-03-28 ENCOUNTER — TELEPHONE (OUTPATIENT)
Dept: FAMILY MEDICINE | Facility: CLINIC | Age: 46
End: 2021-03-28

## 2021-03-28 DIAGNOSIS — J45.21 MILD INTERMITTENT ASTHMATIC BRONCHITIS WITH ACUTE EXACERBATION: Primary | ICD-10-CM

## 2021-03-28 RX ORDER — FLUTICASONE PROPIONATE AND SALMETEROL 250; 50 UG/1; UG/1
1 POWDER RESPIRATORY (INHALATION) 2 TIMES DAILY
Qty: 60 EACH | Refills: 0 | Status: SHIPPED | OUTPATIENT
Start: 2021-03-28 | End: 2021-06-16 | Stop reason: SDUPTHER

## 2021-03-28 RX ORDER — METHYLPREDNISOLONE 4 MG/1
TABLET ORAL
Qty: 1 PACKAGE | Refills: 0 | Status: SHIPPED | OUTPATIENT
Start: 2021-03-28 | End: 2021-04-18

## 2021-04-08 ENCOUNTER — TELEPHONE (OUTPATIENT)
Dept: NEUROSURGERY | Facility: CLINIC | Age: 46
End: 2021-04-08

## 2021-04-16 ENCOUNTER — PATIENT MESSAGE (OUTPATIENT)
Dept: RESEARCH | Facility: HOSPITAL | Age: 46
End: 2021-04-16

## 2021-04-22 ENCOUNTER — PATIENT MESSAGE (OUTPATIENT)
Dept: FAMILY MEDICINE | Facility: CLINIC | Age: 46
End: 2021-04-22

## 2021-04-22 DIAGNOSIS — L65.9 ALOPECIA: ICD-10-CM

## 2021-04-23 RX ORDER — FINASTERIDE 1 MG/1
1 TABLET, FILM COATED ORAL DAILY
Qty: 30 TABLET | Refills: 2 | Status: SHIPPED | OUTPATIENT
Start: 2021-04-23 | End: 2022-06-27 | Stop reason: SDUPTHER

## 2021-04-26 RX ORDER — FINASTERIDE 1 MG/1
1 TABLET, FILM COATED ORAL DAILY
Qty: 90 TABLET | Refills: 0 | Status: SHIPPED | OUTPATIENT
Start: 2021-04-26 | End: 2021-05-26

## 2021-06-16 DIAGNOSIS — J45.21 MILD INTERMITTENT ASTHMATIC BRONCHITIS WITH ACUTE EXACERBATION: ICD-10-CM

## 2021-06-16 RX ORDER — FLUTICASONE PROPIONATE AND SALMETEROL 250; 50 UG/1; UG/1
1 POWDER RESPIRATORY (INHALATION) 2 TIMES DAILY
Qty: 60 EACH | Refills: 0 | Status: SHIPPED | OUTPATIENT
Start: 2021-06-16 | End: 2021-06-18 | Stop reason: SDUPTHER

## 2021-06-17 ENCOUNTER — PATIENT MESSAGE (OUTPATIENT)
Dept: FAMILY MEDICINE | Facility: CLINIC | Age: 46
End: 2021-06-17

## 2021-06-17 DIAGNOSIS — J45.21 MILD INTERMITTENT ASTHMATIC BRONCHITIS WITH ACUTE EXACERBATION: ICD-10-CM

## 2021-06-18 RX ORDER — FLUTICASONE PROPIONATE AND SALMETEROL 250; 50 UG/1; UG/1
1 POWDER RESPIRATORY (INHALATION) 2 TIMES DAILY
Qty: 60 EACH | Refills: 0 | Status: SHIPPED | OUTPATIENT
Start: 2021-06-18 | End: 2021-06-21 | Stop reason: SDUPTHER

## 2021-06-21 DIAGNOSIS — J45.21 MILD INTERMITTENT ASTHMATIC BRONCHITIS WITH ACUTE EXACERBATION: ICD-10-CM

## 2021-06-25 RX ORDER — FLUTICASONE PROPIONATE AND SALMETEROL 250; 50 UG/1; UG/1
1 POWDER RESPIRATORY (INHALATION) 2 TIMES DAILY
Qty: 60 EACH | Refills: 0 | Status: SHIPPED | OUTPATIENT
Start: 2021-06-25 | End: 2022-03-03 | Stop reason: SDUPTHER

## 2021-11-10 ENCOUNTER — PATIENT MESSAGE (OUTPATIENT)
Dept: FAMILY MEDICINE | Facility: CLINIC | Age: 46
End: 2021-11-10
Payer: COMMERCIAL

## 2021-11-26 ENCOUNTER — IMMUNIZATION (OUTPATIENT)
Dept: PRIMARY CARE CLINIC | Facility: CLINIC | Age: 46
End: 2021-11-26
Payer: COMMERCIAL

## 2021-11-26 DIAGNOSIS — Z23 NEED FOR VACCINATION: Primary | ICD-10-CM

## 2021-11-26 PROCEDURE — 0004A COVID-19, MRNA, LNP-S, PF, 30 MCG/0.3 ML DOSE VACCINE: CPT | Mod: CV19,PBBFAC | Performed by: INTERNAL MEDICINE

## 2021-12-18 DIAGNOSIS — L65.9 ALOPECIA: ICD-10-CM

## 2022-01-20 ENCOUNTER — TELEPHONE (OUTPATIENT)
Dept: NEUROSURGERY | Facility: CLINIC | Age: 47
End: 2022-01-20
Payer: COMMERCIAL

## 2022-01-20 NOTE — TELEPHONE ENCOUNTER
lvm for pt returning call .Requested a call back.         ----- Message from Mel Noble sent at 1/20/2022  2:58 PM CST -----  Contact: Patient  Patient call in requesting an MRI    Please assist    Patient can be reach at [phone]

## 2022-02-07 RX ORDER — FINASTERIDE 1 MG/1
1 TABLET, FILM COATED ORAL DAILY
Qty: 30 TABLET | Refills: 2 | OUTPATIENT
Start: 2022-02-07

## 2022-03-03 DIAGNOSIS — J45.21 MILD INTERMITTENT ASTHMATIC BRONCHITIS WITH ACUTE EXACERBATION: ICD-10-CM

## 2022-03-03 RX ORDER — FLUTICASONE PROPIONATE AND SALMETEROL 250; 50 UG/1; UG/1
1 POWDER RESPIRATORY (INHALATION) 2 TIMES DAILY
Qty: 60 EACH | Refills: 0 | Status: CANCELLED | OUTPATIENT
Start: 2022-03-03 | End: 2023-03-03

## 2022-03-03 RX ORDER — FLUTICASONE PROPIONATE AND SALMETEROL 250; 50 UG/1; UG/1
1 POWDER RESPIRATORY (INHALATION) 2 TIMES DAILY
Qty: 60 EACH | Refills: 0 | Status: SHIPPED | OUTPATIENT
Start: 2022-03-03 | End: 2023-03-22

## 2022-06-24 ENCOUNTER — PATIENT MESSAGE (OUTPATIENT)
Dept: FAMILY MEDICINE | Facility: CLINIC | Age: 47
End: 2022-06-24

## 2022-06-24 DIAGNOSIS — L65.9 ALOPECIA: ICD-10-CM

## 2022-06-24 RX ORDER — FINASTERIDE 1 MG/1
1 TABLET, FILM COATED ORAL DAILY
Qty: 30 TABLET | Refills: 2 | Status: CANCELLED | OUTPATIENT
Start: 2022-06-24

## 2022-06-27 DIAGNOSIS — L65.9 ALOPECIA: ICD-10-CM

## 2022-06-27 NOTE — TELEPHONE ENCOUNTER
This patient was last seen 9-. Has no showed/cancelled 2 subsequent appointments. I messaged him and let him know that he needs an appointment but he is wanting you to refill the medication.

## 2022-06-28 RX ORDER — FINASTERIDE 1 MG/1
1 TABLET, FILM COATED ORAL DAILY
Qty: 30 TABLET | Refills: 2 | Status: SHIPPED | OUTPATIENT
Start: 2022-06-28

## 2022-06-28 NOTE — PROGRESS NOTES
06/28/2022      Re: Termination of Physician / Patient Relationship    Dear Mr. Josef Sage,    As you know, the physician/patient relationship is an entirely voluntary one. As the patient, you have the right to terminate it at any time; conversely, I as the physician have the right to terminate it at any time.      As a matter of professional judgment, I am electing to exercise my privilege to terminate our professional relationship, effective immediately.    The reason for termination of relation ship is non compliance to follow up and repeat expectations for medication refills without the benefit of a due diligence exam.    In order to give you time to locate and make arrangements with another primary care physician, I will see you for emergencies only for the next forty five (45) days. After the forty five (45) days, I will not see you further on any basis.     When you have a new provider, that doctor can request your records be sent to his or her office to assure your continued care.       Sincerely,         Neel Maldonado MD

## 2023-03-22 ENCOUNTER — TELEPHONE (OUTPATIENT)
Dept: ADMINISTRATIVE | Facility: HOSPITAL | Age: 48
End: 2023-03-22
Payer: COMMERCIAL

## 2023-03-22 ENCOUNTER — OFFICE VISIT (OUTPATIENT)
Dept: OTOLARYNGOLOGY | Facility: CLINIC | Age: 48
End: 2023-03-22
Payer: COMMERCIAL

## 2023-03-22 VITALS — WEIGHT: 202.81 LBS | TEMPERATURE: 98 F | BODY MASS INDEX: 28.29 KG/M2

## 2023-03-22 DIAGNOSIS — J01.90 ACUTE SINUSITIS, RECURRENCE NOT SPECIFIED, UNSPECIFIED LOCATION: Primary | ICD-10-CM

## 2023-03-22 DIAGNOSIS — J30.89 NON-SEASONAL ALLERGIC RHINITIS, UNSPECIFIED TRIGGER: ICD-10-CM

## 2023-03-22 DIAGNOSIS — J45.20 MILD INTERMITTENT ASTHMA WITHOUT COMPLICATION: ICD-10-CM

## 2023-03-22 PROCEDURE — 87077 CULTURE AEROBIC IDENTIFY: CPT | Performed by: PHYSICIAN ASSISTANT

## 2023-03-22 PROCEDURE — 99203 PR OFFICE/OUTPT VISIT, NEW, LEVL III, 30-44 MIN: ICD-10-PCS | Mod: S$GLB,,, | Performed by: PHYSICIAN ASSISTANT

## 2023-03-22 PROCEDURE — 99999 PR PBB SHADOW E&M-EST. PATIENT-LVL III: ICD-10-PCS | Mod: PBBFAC,,, | Performed by: PHYSICIAN ASSISTANT

## 2023-03-22 PROCEDURE — 87070 CULTURE OTHR SPECIMN AEROBIC: CPT | Performed by: PHYSICIAN ASSISTANT

## 2023-03-22 PROCEDURE — 99999 PR PBB SHADOW E&M-EST. PATIENT-LVL III: CPT | Mod: PBBFAC,,, | Performed by: PHYSICIAN ASSISTANT

## 2023-03-22 PROCEDURE — 87185 SC STD ENZYME DETCJ PER NZM: CPT | Performed by: PHYSICIAN ASSISTANT

## 2023-03-22 PROCEDURE — 87186 SC STD MICRODIL/AGAR DIL: CPT | Mod: 59 | Performed by: PHYSICIAN ASSISTANT

## 2023-03-22 PROCEDURE — 99203 OFFICE O/P NEW LOW 30 MIN: CPT | Mod: S$GLB,,, | Performed by: PHYSICIAN ASSISTANT

## 2023-03-22 PROCEDURE — 87075 CULTR BACTERIA EXCEPT BLOOD: CPT | Performed by: PHYSICIAN ASSISTANT

## 2023-03-22 RX ORDER — ALBUTEROL SULFATE 90 UG/1
1-2 AEROSOL, METERED RESPIRATORY (INHALATION) EVERY 6 HOURS PRN
Qty: 6.7 G | Refills: 1 | Status: SHIPPED | OUTPATIENT
Start: 2023-03-22 | End: 2023-03-22

## 2023-03-22 RX ORDER — AMOXICILLIN AND CLAVULANATE POTASSIUM 875; 125 MG/1; MG/1
1 TABLET, FILM COATED ORAL EVERY 12 HOURS
Qty: 20 TABLET | Refills: 0 | Status: SHIPPED | OUTPATIENT
Start: 2023-03-22 | End: 2023-03-24

## 2023-03-22 RX ORDER — ALBUTEROL SULFATE 90 UG/1
1-2 AEROSOL, METERED RESPIRATORY (INHALATION) EVERY 6 HOURS PRN
Qty: 6.7 G | Refills: 1 | Status: SHIPPED | OUTPATIENT
Start: 2023-03-22 | End: 2024-03-21

## 2023-03-22 NOTE — PATIENT INSTRUCTIONS
I will update you with results from nasal culture.  May not be completely resulted until next week, but should have a better idea come friday.     Please contact central scheduling at [phone] or [phone] to schedule your appointment if you do not hear from allergy service within the next week.

## 2023-03-24 ENCOUNTER — TELEPHONE (OUTPATIENT)
Dept: NEUROSURGERY | Facility: CLINIC | Age: 48
End: 2023-03-24
Payer: COMMERCIAL

## 2023-03-24 ENCOUNTER — TELEPHONE (OUTPATIENT)
Dept: ADMINISTRATIVE | Facility: HOSPITAL | Age: 48
End: 2023-03-24
Payer: COMMERCIAL

## 2023-03-24 DIAGNOSIS — J01.90 ACUTE SINUSITIS, RECURRENCE NOT SPECIFIED, UNSPECIFIED LOCATION: Primary | ICD-10-CM

## 2023-03-24 DIAGNOSIS — M54.50 LOW BACK PAIN, UNSPECIFIED BACK PAIN LATERALITY, UNSPECIFIED CHRONICITY, UNSPECIFIED WHETHER SCIATICA PRESENT: Primary | ICD-10-CM

## 2023-03-24 LAB
BACTERIA SPEC AEROBE CULT: ABNORMAL

## 2023-03-24 RX ORDER — DOXYCYCLINE 100 MG/1
100 CAPSULE ORAL 2 TIMES DAILY
Qty: 20 CAPSULE | Refills: 0 | Status: SHIPPED | OUTPATIENT
Start: 2023-03-24 | End: 2023-04-03

## 2023-03-24 NOTE — TELEPHONE ENCOUNTER
----- Message from Cherry Ny NP sent at 3/22/2023  3:50 PM CDT -----  Regarding: RE: Appt Request  I guess?   ----- Message -----  From: Juma Stephens MA  Sent: 3/22/2023   2:55 PM CDT  To: Cherry Ny NP  Subject: RE: Appt Request                                 Will do! Scoli xray?  ----- Message -----  From: Cherry Ny NP  Sent: 3/22/2023  12:49 PM CDT  To: Juma Stephens MA  Subject: FW: Appt Request                                 Ashlynas said that he will see him please.   ----- Message -----  From: Juma Stephens MA  Sent: 3/21/2023   3:18 PM CDT  To: Cherry Ny NP  Subject: FW: Appt Request                                 Do you know anything about this patient or his mother?  ----- Message -----  From: Juma Stephens MA  Sent: 3/20/2023   4:59 PM CDT  To: Juma Stephens MA  Subject: FW: Appt Request                                   ----- Message -----  From: Letitia Vargas  Sent: 3/20/2023  12:24 PM CDT  To: [facility] Staff  Subject: Appt Request                                     Pts mother called about scheduling appt with doctor for herniated disc. Mother states she spoke to doctor about scheduling appt.     Call back- [phone] Sagar Sage

## 2023-03-27 LAB — BACTERIA SPEC ANAEROBE CULT: NORMAL

## 2023-03-29 ENCOUNTER — HOSPITAL ENCOUNTER (OUTPATIENT)
Dept: RADIOLOGY | Facility: HOSPITAL | Age: 48
Discharge: HOME | End: 2023-03-29
Attending: NURSE PRACTITIONER
Payer: COMMERCIAL

## 2023-03-29 ENCOUNTER — OFFICE VISIT (OUTPATIENT)
Dept: ALLERGY | Facility: CLINIC | Age: 48
End: 2023-03-29
Payer: COMMERCIAL

## 2023-03-29 VITALS
OXYGEN SATURATION: 96 % | HEART RATE: 82 BPM | SYSTOLIC BLOOD PRESSURE: 129 MMHG | DIASTOLIC BLOOD PRESSURE: 84 MMHG | BODY MASS INDEX: 28.41 KG/M2 | WEIGHT: 203.69 LBS

## 2023-03-29 DIAGNOSIS — J32.9 RECURRENT SINUSITIS: Primary | ICD-10-CM

## 2023-03-29 DIAGNOSIS — J30.89 NON-SEASONAL ALLERGIC RHINITIS, UNSPECIFIED TRIGGER: ICD-10-CM

## 2023-03-29 DIAGNOSIS — M54.50 LOW BACK PAIN, UNSPECIFIED BACK PAIN LATERALITY, UNSPECIFIED CHRONICITY, UNSPECIFIED WHETHER SCIATICA PRESENT: ICD-10-CM

## 2023-03-29 PROCEDURE — 99999 PR PBB SHADOW E&M-EST. PATIENT-LVL III: ICD-10-PCS | Mod: PBBFAC,,, | Performed by: ALLERGY & IMMUNOLOGY

## 2023-03-29 PROCEDURE — 99204 OFFICE O/P NEW MOD 45 MIN: CPT | Mod: S$GLB,,, | Performed by: ALLERGY & IMMUNOLOGY

## 2023-03-29 PROCEDURE — 99204 PR OFFICE/OUTPT VISIT, NEW, LEVL IV, 45-59 MIN: ICD-10-PCS | Mod: S$GLB,,, | Performed by: ALLERGY & IMMUNOLOGY

## 2023-03-29 PROCEDURE — 72082 X-RAY EXAM ENTIRE SPI 2/3 VW: CPT | Mod: TC

## 2023-03-29 PROCEDURE — 99999 PR PBB SHADOW E&M-EST. PATIENT-LVL III: CPT | Mod: PBBFAC,,, | Performed by: ALLERGY & IMMUNOLOGY

## 2023-03-29 PROCEDURE — 72082 X-RAY EXAM ENTIRE SPI 2/3 VW: CPT | Mod: 26,,, | Performed by: RADIOLOGY

## 2023-03-29 PROCEDURE — 72082 XR SCOLIOSIS COMPLETE: ICD-10-PCS | Mod: 26,,, | Performed by: RADIOLOGY

## 2023-03-29 RX ORDER — FLUTICASONE PROPIONATE 50 MCG
2 SPRAY, SUSPENSION (ML) NASAL 2 TIMES DAILY
Qty: 16 G | Refills: 11 | Status: SHIPPED | OUTPATIENT
Start: 2023-03-29

## 2023-03-29 NOTE — PROGRESS NOTES
Subjective:       Patient ID: Josef Sage is a 47 y.o. male.    Chief Complaint:  Allergic Rhinitis       HPI:   Patient's symptoms include clear rhinorrhea, cough, nasal congestion, sinus pressure, sneezing, and swelling of eyes. These symptoms are perennial with seasonal exacerbation.  Worse in winter. Current triggers include exposure to no known precipitant. The patient has been suffering from these symptoms for approximately 6 years. The patient has tried  benadryl prn, prn flonase  with fair relief of symptoms. Immunotherapy has never been tried. The patient has never had nasal polyps.  Has hx of wheeze w URI. Was on Advair 1 yr ago  The patient has no history of eczema. The patient takes antibiotics for sinusitis 2-3 times per year. Over last 5-6 yrs, oft leading to lower resp infections. feels has URIs more often than 2-3 times per year. Symptoms do improve with antibiotics. The patient has not had sinus surgery in the past.   + dev septum        Environmental History: Pets in the home: none.  Lary: tile or linoleum floors and [id number] carpeting  Tobacco Smoke in Home: no    Past Medical History:   Diagnosis Date    Asthma     Bronchitis     Mixed hyperlipidemia 09/17/2019    Recurrent upper respiratory infection (URI)     Wheezes          Family History   Problem Relation Age of Onset    Hypertension Mother     Hypertension Father     Eczema Sister          Review of Systems   Constitutional:  Negative for activity change, fatigue and fever.   HENT:  Negative for congestion, postnasal drip, rhinorrhea, sinus pressure and sneezing.    Eyes:  Negative for discharge, redness and itching.   Respiratory:  Negative for cough, shortness of breath and wheezing.    Cardiovascular:  Negative for chest pain.   Gastrointestinal:  Negative for constipation, diarrhea, nausea and vomiting.   Genitourinary:  Negative for difficulty urinating.   Musculoskeletal:  Negative for joint swelling and myalgias.   Skin:   Negative for rash.   Neurological:  Negative for headaches.   Hematological:  Does not bruise/bleed easily.   Psychiatric/Behavioral:  Negative for behavioral problems and sleep disturbance.         Objective:   Physical Exam  Constitutional:       General: He is not in acute distress.     Appearance: He is well-developed. He is not diaphoretic.   HENT:      Head: Normocephalic and atraumatic.      Right Ear: Tympanic membrane and external ear normal.      Left Ear: Tympanic membrane and external ear normal.      Nose: Nose normal.      Comments: L dev nasal septum     Mouth/Throat:      Pharynx: No oropharyngeal exudate.   Eyes:      General:         Right eye: No discharge.         Left eye: No discharge.      Conjunctiva/sclera: Conjunctivae normal.   Neck:      Thyroid: No thyromegaly.   Cardiovascular:      Rate and Rhythm: Normal rate and regular rhythm.   Pulmonary:      Effort: Pulmonary effort is normal. No respiratory distress.      Breath sounds: Normal breath sounds. No wheezing.   Abdominal:      General: Bowel sounds are normal. There is no distension.      Palpations: Abdomen is soft.      Tenderness: There is no abdominal tenderness.   Musculoskeletal:         General: Normal range of motion.      Cervical back: Normal range of motion and neck supple.   Lymphadenopathy:      Cervical: No cervical adenopathy.   Skin:     General: Skin is warm.      Findings: No erythema or rash.   Neurological:      Mental Status: He is alert and oriented to person, place, and time.      Motor: No abnormal muscle tone.   Psychiatric:         Behavior: Behavior normal.         Thought Content: Thought content normal.         Judgment: Judgment normal.           IgG   Date Value Ref Range Status   03/29/2023 1248 650 - 1600 mg/dL Final     Comment:     IgG Cord Blood Reference Range: 650-1600 mg/dL.     IgM   Date Value Ref Range Status   03/29/2023 43 (L) 50 - 300 mg/dL Final     Comment:     IgM Cord Blood Reference  Range: <25 mg/dL.     IgA   Date Value Ref Range Status   03/29/2023 237 40 - 350 mg/dL Final     Comment:     IgA Cord Blood Reference Range: <5 mg/dL.        No results found for: IGE    Eos #   Date Value Ref Range Status   03/29/2023 0.2 0.0 - 0.5 K/uL Final   09/14/2019 228 15 - 500 cells/uL Final   02/25/2016 0.0 0.0 - 0.5 K/uL Final     Eosinophil %   Date Value Ref Range Status   03/29/2023 2.1 0.0 - 8.0 % Final   09/14/2019 3.4 % Final   02/25/2016 0.1 0.0 - 8.0 % Final           Assessment:       1. Recurrent sinusitis    2. Non-seasonal allergic rhinitis, unspecified trigger         Plan:       Josef was seen today for allergic rhinitis .    Diagnoses and all orders for this visit:    Recurrent sinusitis  -     Immunoglobulins (IgG, IgA, IgM) Quantitative; Future  -     IgE; Future  -     CBC Auto Differential; Future  -     Cat epithelium IgE; Future  -     Dog dander IgE; Future  -     D. farinae IgE; Future  -     D. pteronyssinus IgE; Future  -     Aspergillus fumagatus IgE; Future  -     Allergen-Alternaria Alternata; Future  -     Cockroach, American IgE; Future  -     Bahia grass IgE; Future  -     Jose Elias IgE; Future  -     Oak, white IgE; Future  -     Allergen-Cedar; Future  -     Allergen, Pecan Tree IgE; Future  -     Ragweed, short, common IgE; Future  -     Marsh elder, rough IgE; Future  -     Plantain, English IgE; Future  -     S.pneumoniae IgG Serotypes; Future    Non-seasonal allergic rhinitis, unspecified trigger  -     Ambulatory referral/consult to Allergy  -     Immunoglobulins (IgG, IgA, IgM) Quantitative; Future  -     IgE; Future  -     CBC Auto Differential; Future  -     Cat epithelium IgE; Future  -     Dog dander IgE; Future  -     D. farinae IgE; Future  -     D. pteronyssinus IgE; Future  -     Aspergillus fumagatus IgE; Future  -     Allergen-Alternaria Alternata; Future  -     Cockroach, American IgE; Future  -     Bahia grass IgE; Future  -     Jose Elias IgE; Future  -      Oak, white IgE; Future  -     Allergen-Cedar; Future  -     Allergen, Pecan Tree IgE; Future  -     Ragweed, short, common IgE; Future  -     Marsh elder, rough IgE; Future  -     Plantain, English IgE; Future  -     S.pneumoniae IgG Serotypes; Future    Other orders  -     fluticasone propionate (FLONASE) 50 mcg/actuation nasal spray; 2 sprays (100 mcg total) by Each Nostril route 2 (two) times daily.

## 2023-04-02 ENCOUNTER — PATIENT MESSAGE (OUTPATIENT)
Dept: OTOLARYNGOLOGY | Facility: CLINIC | Age: 48
End: 2023-04-02
Payer: COMMERCIAL

## 2023-04-03 ENCOUNTER — HOSPITAL ENCOUNTER (OUTPATIENT)
Dept: RADIOLOGY | Facility: HOSPITAL | Age: 48
Discharge: HOME | End: 2023-04-03
Attending: NEUROLOGICAL SURGERY
Payer: COMMERCIAL

## 2023-04-03 ENCOUNTER — OFFICE VISIT (OUTPATIENT)
Dept: SPINE | Facility: CLINIC | Age: 48
End: 2023-04-03
Payer: COMMERCIAL

## 2023-04-03 VITALS — HEART RATE: 89 BPM | SYSTOLIC BLOOD PRESSURE: 146 MMHG | DIASTOLIC BLOOD PRESSURE: 91 MMHG

## 2023-04-03 DIAGNOSIS — J32.4 CHRONIC PANSINUSITIS: Primary | ICD-10-CM

## 2023-04-03 DIAGNOSIS — J01.90 ACUTE SINUSITIS, RECURRENCE NOT SPECIFIED, UNSPECIFIED LOCATION: ICD-10-CM

## 2023-04-03 DIAGNOSIS — M54.12 C6 RADICULOPATHY: Primary | ICD-10-CM

## 2023-04-03 DIAGNOSIS — M54.12 C6 RADICULOPATHY: ICD-10-CM

## 2023-04-03 PROCEDURE — 99999 PR PBB SHADOW E&M-EST. PATIENT-LVL III: ICD-10-PCS | Mod: PBBFAC,,, | Performed by: NEUROLOGICAL SURGERY

## 2023-04-03 PROCEDURE — 99999 PR PBB SHADOW E&M-EST. PATIENT-LVL III: CPT | Mod: PBBFAC,,, | Performed by: NEUROLOGICAL SURGERY

## 2023-04-03 PROCEDURE — 99204 PR OFFICE/OUTPT VISIT, NEW, LEVL IV, 45-59 MIN: ICD-10-PCS | Mod: S$GLB,,, | Performed by: NEUROLOGICAL SURGERY

## 2023-04-03 PROCEDURE — 99204 OFFICE O/P NEW MOD 45 MIN: CPT | Mod: S$GLB,,, | Performed by: NEUROLOGICAL SURGERY

## 2023-04-03 RX ORDER — DOXYCYCLINE 100 MG/1
100 CAPSULE ORAL 2 TIMES DAILY
Qty: 22 CAPSULE | Refills: 0 | Status: SHIPPED | OUTPATIENT
Start: 2023-04-03 | End: 2023-04-14

## 2023-04-03 NOTE — PROGRESS NOTES
Dear Dr. Sage, Sagar VELAZQUEZ MD,    Thank you for referring this patient to my clinic. The full details of my evaluation will also be forthcoming to you by letter.    CHIEF COMPLAINT:  Neck pain, numbness    HPI:  Josef Sage is a 47 y.o.  male with a PMHx of HLD, who is referred to me by Dr. Sage for evaluation of neck pain. Patient reports his pain is associated with numbness and tingling that radiates down his right elbow into his fingers. He describes his pain as a constant dull pain that builds up throughout the day.  He states he has weakness that has prevented him from working up. He reports good balance, no b/b dysfunction, and denies dropping anything from his hands. His symptoms began worsening since December, and he denies receiving injections since his symptoms started to worsen.      Review of patient's allergies indicates:   Allergen Reactions    Sulfa (sulfonamide antibiotics) Rash       Past Medical History:   Diagnosis Date    Asthma     Bronchitis     Mixed hyperlipidemia 09/17/2019    Recurrent upper respiratory infection (URI)     Wheezes      Past Surgical History:   Procedure Laterality Date    MOUTH SURGERY      None       Family History   Problem Relation Age of Onset    Hypertension Mother     Hypertension Father     Eczema Sister      Social History     Tobacco Use    Smoking status: Never    Smokeless tobacco: Never   Substance Use Topics    Alcohol use: Never     Alcohol/week: 1.0 standard drink     Types: 1 Glasses of wine per week    Drug use: No        Review of Systems   Constitutional:  Negative for fatigue, fever and unexpected weight change.   HENT:  Negative for ear pain, hearing loss, rhinorrhea, tinnitus, trouble swallowing and voice change.    Eyes:  Negative for photophobia and pain.   Respiratory:  Negative for cough, chest tightness and shortness of breath.    Cardiovascular:  Negative for chest pain.   Gastrointestinal:  Negative for constipation, diarrhea, nausea and  vomiting.   Genitourinary:  Negative for decreased urine volume, difficulty urinating, frequency, testicular pain and urgency.   Musculoskeletal:  Positive for neck pain. Negative for arthralgias, back pain, joint swelling and neck stiffness.   Neurological:  Positive for weakness and numbness. Negative for dizziness, tremors, seizures, syncope, facial asymmetry, speech difficulty, light-headedness and headaches.   Psychiatric/Behavioral:  Negative for agitation, confusion and decreased concentration. The patient is not nervous/anxious.      OBJECTIVE:     Vital Signs:  Pulse: 89 (04/03/23 1405)  BP: (!) 146/91 (04/03/23 1405)    Physical Exam:    Vital signs: All nursing notes and vital signs reviewed -- afebrile, vital signs stable.  Constitutional: Patient sitting comfortably in chair. Appears well developed and well nourished.  Skin: Exposed areas are intact without abnormal markings, rashes or other lesions.  HEENT: Normocephalic. Normal conjunctivae.  Cardiovascular: Normal rate and regular rhythm.  Respiratory: Chest wall rises and falls symmetrically, without signs of respiratory distress.  Abdomen: Soft and non-tender.  Extremities: Warm and without edema. Calves supple, non-tender.  Psych/Behavior: Normal affect.    Neurological:    Mental status: Alert and oriented. Conversational and appropriate.       Cranial Nerves: VFF to confrontation. PERRL. EOMI without nystagmus. Facial STLT normal and symmetric. Strong, symmetric muscles of mastication. Facial strength full and symmetric. Hearing equal bilaterally to finger rub. Palate and uvula rise and fall normally in midline. Shoulder shrug 5/5 strength. Tongue midline.     Motor:    Upper:  Deltoids Triceps Biceps WE WF     R 5/5 5/5 5/5 5/5 5/5 5/5    L 5/5 5/5 5/5 5/5 5/5 5/5      Lower:  HF KE KF DF PF EHL    R 5/5 5/5 5/5 5/5 5/5 5/5    L 5/5 5/5 5/5 5/5 5/5 5/5     Sensory: Intact sensation to light touch in all extremities. Romberg  negative.    Reflexes:          DTR: 2+ symmetrically throughout.     Abdominal reflexes: Normal, symmetric.     Alfred's: Negative.     Babinski's: Negative.     Clonus: Negative.    Cerebellar: Finger-to-nose and rapid alternating movements normal. Gait stable, fluid.    Spine:    Posture: Head well aligned over pelvis in front and side views.  No focal or global spinal deformity visible on inspection. Shoulders and hips even. No obvious leg length discrepancy. No scapula winging.    Bending: Full ROM with forward, back and lateral bending. No rib prominence with forward bend.    Cervical:      ROM: Full with flexion, extension, lateral rotation and ear-to-shoulder bend.      Midline TTP: Negative.     Spurling's test: Negative.     Lhermitte's: Negative.    Thoracic:     Midline TTP: Negative    Lumbar:     Midline TTP: Negative     Straight Leg Test: Negative     Crossed Straight Leg Test: Negative     Sciatic notch tenderness: Negative.    Other:     SI joint TTP: Negative.     Greater trochanter TTP: Negative.     Tenderness with external/internal hip rotation: Negative.    Diagnostic Results:  All imaging was independently reviewed by me.    MRI cervical spine, dated 4/16/15:  1. Disc herniation right C5-6 with moderate right foraminal stenosis    Scoliosis standing AP and Lateral X-ray, dated 3/29/23:  No significant deformities     ASSESSMENT/PLAN:     Josef Sage has a right C5-6 disc bulge with moderate C6 stenosis. His right C6 radiculopathy is chronic but is recently getting worse. I recommend a C5-6 ELKE. Should surgery be needed I'd recommend a C5-6 ACD.    The patient understands and agrees with the plan of care. All questions were answered.     1. C5-6 ELKE  2. RTC PRN          Ivan Dalton M.D.  Department of Neurosurgery  [facility]

## 2023-04-06 DIAGNOSIS — M54.12 CERVICAL RADICULOPATHY: Primary | ICD-10-CM

## 2023-04-10 ENCOUNTER — PATIENT MESSAGE (OUTPATIENT)
Dept: PAIN MEDICINE | Facility: OTHER | Age: 48
End: 2023-04-10
Payer: COMMERCIAL

## 2023-04-10 ENCOUNTER — TELEPHONE (OUTPATIENT)
Dept: ALLERGY | Facility: CLINIC | Age: 48
End: 2023-04-10
Payer: COMMERCIAL

## 2023-04-10 NOTE — TELEPHONE ENCOUNTER
----- Message from Michele Pacheco MD sent at 4/6/2023  5:33 PM CDT -----  Please call to let know that evaluation of pt's immune system showed that pt may benefit from an extra vaccine, Pneumovax, to decrease frequency of infections. Please schedule follow up appointment to review labs and discuss Pneumovax vaccine.  Allergy tests show only very low positives to cockroach, dust mites

## 2023-04-13 ENCOUNTER — PATIENT MESSAGE (OUTPATIENT)
Dept: SPINE | Facility: CLINIC | Age: 48
End: 2023-04-13
Payer: COMMERCIAL

## 2023-04-20 ENCOUNTER — OFFICE VISIT (OUTPATIENT)
Dept: NEUROSURGERY | Facility: CLINIC | Age: 48
End: 2023-04-20
Payer: COMMERCIAL

## 2023-04-20 DIAGNOSIS — G95.9 CERVICAL MYELOPATHY: Primary | ICD-10-CM

## 2023-04-20 PROCEDURE — 99213 OFFICE O/P EST LOW 20 MIN: CPT | Mod: 95,,, | Performed by: NEUROLOGICAL SURGERY

## 2023-04-20 PROCEDURE — 99213 PR OFFICE/OUTPT VISIT, EST, LEVL III, 20-29 MIN: ICD-10-PCS | Mod: 95,,, | Performed by: NEUROLOGICAL SURGERY

## 2023-04-20 NOTE — PROGRESS NOTES
Spent 10 minutes on VV. He would like to proceed with C5-6 ACD. See my prior note for details. R/B/A/I/M were reviewed in detail and he wishes to proceed. We will need a new MRI C spine prior to surgery.

## 2023-04-21 ENCOUNTER — TELEPHONE (OUTPATIENT)
Dept: NEUROSURGERY | Facility: CLINIC | Age: 48
End: 2023-04-21

## 2023-04-21 ENCOUNTER — TELEPHONE (OUTPATIENT)
Dept: ADMINISTRATIVE | Facility: OTHER | Age: 48
End: 2023-04-21

## 2023-04-21 DIAGNOSIS — M47.22 CERVICAL SPONDYLOSIS WITH RADICULOPATHY: ICD-10-CM

## 2023-04-21 DIAGNOSIS — M47.12 SPONDYLOSIS, CERVICAL, WITH MYELOPATHY: Primary | ICD-10-CM

## 2023-04-21 DIAGNOSIS — Z01.818 PREOPERATIVE TESTING: Primary | ICD-10-CM

## 2023-04-21 RX ORDER — ESCITALOPRAM OXALATE 10 MG/1
10 TABLET ORAL NIGHTLY
COMMUNITY
End: 2023-11-13 | Stop reason: SDUPTHER

## 2023-04-21 NOTE — PRE-PROCEDURE INSTRUCTIONS
Patient stated has not had any problem with anesthesia in the past. Will need medical optimization. He does not have a PCP. He has an appt already scheduled with Bradley Dickerson NP on . Will need labs.  Our  will call to set up this appt.   Preop instructions given: Hold aspirin, aspirin containing products, nsaids(aleve, advil, motrin, ibuprofen, naprosyn, naproxen, voltaren, diclofenac), vitamins and supplements one week prior to surgery.  Stop CBD edibles one week prior to surgery.   May take Tylenol.  Medication instructions given:   albuterol (PROVENTIL/VENTOLIN HFA) 90 mcg/actuation inhaler 6.7 g 1 3/22/2023 3/21/2024   Sig: Inhale 1-2 puffs into the lungs every 6 (six) hours as needed for Wheezing.   Route: Inhalation       Jennyfer Bull RN 2023  2:24 PM   TAKE IN AM OF SURGERY IF NEEDED AND BRING.             EScitalopram oxalate (LEXAPRO) 10 MG tablet       Sig: Take 10 mg by mouth every evening.   Class: Historical Med   Route: Oral       Jennyfer Bull RN 2023  2:25 PM   TAKE THE NIGHT BEFORE SURGERY.              fluticasone propionate (FLONASE) 50 mcg/actuation nasal spray 16 g 11 3/29/2023    Si sprays (100 mcg total) by Each Nostril route 2 (two) times daily.   Route: Each Nostril       Jennyfer Bull RN 2023  2:24 PM   HOLD IN AM OF SURGERY.             PROPECIA 1 mg tablet 30 tablet 2 2022    Sig: Take 1 tablet (1 mg total) by mouth once daily.   Route: Oral       Jennyfer Bull RN 2023  2:24 PM   TAKE THE NIGHT BEFORE SURGERY.              Your surgery has been scheduled for:_Wednesday 5/10/2023_________________________________________  [facility] ( [place]) [phone]  You should report to:  ____[facility], located on the [place] side of the first floor of the           [facility] ([phone])  __x__The Second Floor Surgery Center, located on the [facility] side of the            Second floor of the [facility]  ([phone])  ____3rd Floor [facility] located on the [facility] side of the [facility] [phone]  Please Note   Tell your doctor if you take Aspirin, products containing Aspirin, herbal medications  or blood thinners, such as Coumadin, Ticlid, or Plavix.  (Consult your provider regarding holding or stopping before surgery).  Arrange for someone to drive you home following surgery.  You will not be allowed to leave the surgical facility alone or drive yourself home following sedation and anesthesia.  Before Surgery  Stop taking all vitamins/ herbal medications 14days prior to surgery  No Motrin/Advil (Ibuprofen) 7 days before surgery  No Aleve (Naproxen) 7 days before surgery  Stop Taking Asprin, products containing Asprin __7___days before surgery  Stop taking blood thinners_______days before surgery  No Goody's/BC  Powder 7 days before surgery  Refrain from drinking alcoholic beverages for 24hours before and after surgery  Stop or limit smoking _________days before surgery( nonsmoker)  You may take Tylenol for pain  Night before Surgery  Nothing to eat or drink after midnight.  Take a shower or bath (shower is recommended).  Bathe with Hibiclens soap or an antibacterial soap from the neck down.  If not supplied by your surgeon, hibiclens soap will need to be purchased over the counter in pharmacy.  Rinse soap off thoroughly.  Shampoo your hair with your regular shampoo  The Day of Surgery  NOTHING TO  DRINK 2 hours before arrival time. If you are told to take medication on the morning of surgery, it may be taken with a sip of water.   Take another bath or shower with hibiclens or any antibacterial soap, to reduce the chance of infection.  Take heart and blood pressure medications with a small sip of water, as advised by the perioperative team.  Do not take fluid pills  You may brush your teeth and rinse your mouth, but do not swall any additional water.   Do not apply perfumes,  powder, body lotions or deodorant on the day of surgery.  Nail polish should be removed.  Do not wear makeup or moisturizer  Wear comfortable clothes, such as a button front shirt and loose fitting pants.  Leave all jewelry, including body piercings, and valuables at home.    Bring any devices you will neeed after surgery such as crutches or canes.  If you have sleep apnea, please bring your CPAP machine  In the event that your physical condition changes including the onset of a cold or respiratory illness, or if you have to delay or cancel your surgery, please notify your surgeon.   Directions to the surgery center given.  Verbalizes understanding. Sent preop and med instructions to [facility] portal.

## 2023-04-21 NOTE — ANESTHESIA PAT ROS NOTE
04/21/2023  Josef Sage is a 47 y.o., male.      Pre-op Assessment          Review of Systems  Anesthesia Hx:  No problems with previous Anesthesia             Denies Family Hx of Anesthesia complications.    Denies Personal Hx of Anesthesia complications.                    Social:  Non-Smoker, No Alcohol Use       Hematology/Oncology:  Hematology Normal   Oncology Normal                                   EENT/Dental:  chronic allergic rhinitis RECURRENT SINUSITIS AND RECURRENT URI- LAST ONE MONTH AGO PER PATIENT AS OF 4/21/2023          Cardiovascular:            Denies Angina.     hyperlipidemia     Functional Capacity 4 METS, ABLE TO CLIMB 12 FLIGHTS OF STAIRS                         Pulmonary:         H/O BRONCHITIS PER Epic LIST OF PROBLEMS Denies Pulmonary Symptoms.     Asthma:  last episode was > 1 year ago. Emergency visits this year is none.       Inhaler use is rescue inhaler PRN.               Renal/:  Renal/ Normal                 Hepatic/GI:     GERD             Musculoskeletal:   Musculoskeletal General/Symptoms:  Functional capacity is ambulatory without assistance.        Cervical Spine Disorder, Cervical Disc Disease, Radiculopathy, Myelopathy CERVICAL SPONDYLOSIS    Neurological:   Neuro Symptoms of pain OF NECK AND  NUMBNESS AND TINGLING OF RIGHT ARM TO ELBOW                                  Anesthesia Assessment: Preoperative EQUATION    Planned Procedure: Procedure(s) (LRB):  C5-6 ACDF (N/A)  Requested Anesthesia Type:General  Surgeon: Ivan Dalton MD  Service: Neurosurgery  Known or anticipated Date of Surgery:5/10/2023    Surgeon notes: reviewed    Electronic QUestionnaire Assessment completed via nurse interview with patient.        Triage considerations:     The patient has no apparent active cardiac condition (No unstable coronary Syndrome such as severe unstable angina or  recent [<1 month] myocardial infarction, decompensated CHF, severe valvular   disease or significant arrhythmia)    Previous anesthesia records:No problems and Not available   ** HE HAD A MOUTH SURGERY**    Last PCP note:  NO PCP  Subspecialty notes: Allergy, ENT, Neurosurgery, Spine Service    Other important co-morbidities: HLD and CERVICAL SPONDYLOSIS WITH MYELOPATHY AND RADICULOPATHY       Tests already available:  Available tests,  within 3 months , within [facility] .   4/3/2023 XRAY CERVICAL SPINE AP/LAT W F/E & SWIMMERS F/E W/O ODONTOID, 3/29/2023 CBC          Instructions given. (See in Nurse's note)    Optimization:  Anesthesia Preop Clinic Assessment  - Not Indicated for this surgery    Medical Opinion Indicated           Plan:    Testing:  BMP, PT/INR, and T&S        Consultation:IM Perioperative Hospitalist     Patient  has previously scheduled Medical Appointment:5/5 MRI and  BRADLEY DICKERSON NP    Navigation: Tests Scheduled. TBD             Consults scheduled.5/5             Results will be tracked by Preop Clinic.  5/9 Labs resulted and noted by Dr. Sy.  Medical optimization by Bradley Dickerson NP on 5/8.  Jennyfer Bull RN BSN

## 2023-04-24 ENCOUNTER — TELEPHONE (OUTPATIENT)
Dept: NEUROSURGERY | Facility: CLINIC | Age: 48
End: 2023-04-24

## 2023-04-24 ENCOUNTER — TELEPHONE (OUTPATIENT)
Dept: PREADMISSION TESTING | Facility: HOSPITAL | Age: 48
End: 2023-04-24

## 2023-04-24 NOTE — TELEPHONE ENCOUNTER
----- Message from Jennyfer Bull RN sent at 4/21/2023  2:51 PM CDT -----  Surgery 5/10  other Bradley Dickerson 5/5  Please schedule labs same day as above and let the patient know when.  Thanks!

## 2023-04-24 NOTE — TELEPHONE ENCOUNTER
----- Message from Amber Campa MA sent at 4/24/2023  3:54 AM CDT -----  Regarding: FW: Pt Advice  Contact: Pt [phone]    ----- Message -----  From: Olena Pires  Sent: 4/21/2023  12:44 PM CDT  To: , #  Subject: Pt Advice                                        Pt is calling and would like to speak with someone in provider clinic. Please call

## 2023-04-24 NOTE — TELEPHONE ENCOUNTER
P/c to pt this am.  We had already spoken on Friday regarding his surgery being r/s from [facility] to [facility]. Surgery will be on 5/10. Pt. Will have mri 5/5 and when comes in to see dr. Flores for clearance will come to clinic to sign consent.

## 2023-04-24 NOTE — TELEPHONE ENCOUNTER
----- Message from Olena Pires sent at 4/21/2023 12:42 PM CDT -----  Regarding: Pt Advice  Contact: Pt [phone]  Pt is calling and would like to speak with someone in provider clinic. Please call

## 2023-05-02 ENCOUNTER — PATIENT MESSAGE (OUTPATIENT)
Dept: ADMINISTRATIVE | Facility: OTHER | Age: 48
End: 2023-05-02
Payer: COMMERCIAL

## 2023-05-05 ENCOUNTER — HOSPITAL ENCOUNTER (OUTPATIENT)
Dept: RADIOLOGY | Facility: HOSPITAL | Age: 48
Discharge: HOME | End: 2023-05-05
Attending: NEUROLOGICAL SURGERY
Payer: COMMERCIAL

## 2023-05-05 ENCOUNTER — OFFICE VISIT (OUTPATIENT)
Dept: INTERNAL MEDICINE | Facility: CLINIC | Age: 48
End: 2023-05-05
Payer: COMMERCIAL

## 2023-05-05 ENCOUNTER — HOSPITAL ENCOUNTER (OUTPATIENT)
Dept: CARDIOLOGY | Facility: CLINIC | Age: 48
Discharge: HOME | End: 2023-05-05
Payer: COMMERCIAL

## 2023-05-05 ENCOUNTER — TELEPHONE (OUTPATIENT)
Dept: NEUROSURGERY | Facility: CLINIC | Age: 48
End: 2023-05-05
Payer: COMMERCIAL

## 2023-05-05 VITALS
TEMPERATURE: 98 F | SYSTOLIC BLOOD PRESSURE: 132 MMHG | OXYGEN SATURATION: 96 % | DIASTOLIC BLOOD PRESSURE: 87 MMHG | BODY MASS INDEX: 28.63 KG/M2 | HEART RATE: 112 BPM | HEIGHT: 70 IN | WEIGHT: 200 LBS

## 2023-05-05 DIAGNOSIS — R00.0 TACHYCARDIA: Primary | ICD-10-CM

## 2023-05-05 DIAGNOSIS — Z01.818 PRE-OP EVALUATION: ICD-10-CM

## 2023-05-05 DIAGNOSIS — R00.0 TACHYCARDIA: ICD-10-CM

## 2023-05-05 DIAGNOSIS — R06.83 SNORING: ICD-10-CM

## 2023-05-05 DIAGNOSIS — Z98.1 S/P CERVICAL SPINAL FUSION: Primary | ICD-10-CM

## 2023-05-05 DIAGNOSIS — G95.9 CERVICAL MYELOPATHY: ICD-10-CM

## 2023-05-05 DIAGNOSIS — L65.9 ALOPECIA: ICD-10-CM

## 2023-05-05 PROCEDURE — 93010 EKG 12-LEAD: ICD-10-PCS | Mod: S$GLB,,, | Performed by: INTERNAL MEDICINE

## 2023-05-05 PROCEDURE — 3079F DIAST BP 80-89 MM HG: CPT | Mod: CPTII,S$GLB,, | Performed by: NURSE PRACTITIONER

## 2023-05-05 PROCEDURE — 3075F PR MOST RECENT SYSTOLIC BLOOD PRESS GE 130-139MM HG: ICD-10-PCS | Mod: CPTII,S$GLB,, | Performed by: NURSE PRACTITIONER

## 2023-05-05 PROCEDURE — 72141 MRI NECK SPINE W/O DYE: CPT | Mod: TC

## 2023-05-05 PROCEDURE — 3079F PR MOST RECENT DIASTOLIC BLOOD PRESSURE 80-89 MM HG: ICD-10-PCS | Mod: CPTII,S$GLB,, | Performed by: NURSE PRACTITIONER

## 2023-05-05 PROCEDURE — 93010 ELECTROCARDIOGRAM REPORT: CPT | Mod: S$GLB,,, | Performed by: INTERNAL MEDICINE

## 2023-05-05 PROCEDURE — 93005 EKG 12-LEAD: ICD-10-PCS | Mod: S$GLB,,, | Performed by: NURSE PRACTITIONER

## 2023-05-05 PROCEDURE — 3075F SYST BP GE 130 - 139MM HG: CPT | Mod: CPTII,S$GLB,, | Performed by: NURSE PRACTITIONER

## 2023-05-05 PROCEDURE — 3008F PR BODY MASS INDEX (BMI) DOCUMENTED: ICD-10-PCS | Mod: CPTII,S$GLB,, | Performed by: NURSE PRACTITIONER

## 2023-05-05 PROCEDURE — 72141 MRI NECK SPINE W/O DYE: CPT | Mod: 26,,, | Performed by: RADIOLOGY

## 2023-05-05 PROCEDURE — 99203 PR OFFICE/OUTPT VISIT, NEW, LEVL III, 30-44 MIN: ICD-10-PCS | Mod: S$GLB,,, | Performed by: NURSE PRACTITIONER

## 2023-05-05 PROCEDURE — 99203 OFFICE O/P NEW LOW 30 MIN: CPT | Mod: S$GLB,,, | Performed by: NURSE PRACTITIONER

## 2023-05-05 PROCEDURE — 93005 ELECTROCARDIOGRAM TRACING: CPT | Mod: S$GLB,,, | Performed by: NURSE PRACTITIONER

## 2023-05-05 PROCEDURE — 1160F RVW MEDS BY RX/DR IN RCRD: CPT | Mod: CPTII,S$GLB,, | Performed by: NURSE PRACTITIONER

## 2023-05-05 PROCEDURE — 72141 MRI CERVICAL SPINE WITHOUT CONTRAST: ICD-10-PCS | Mod: 26,,, | Performed by: RADIOLOGY

## 2023-05-05 PROCEDURE — 3008F BODY MASS INDEX DOCD: CPT | Mod: CPTII,S$GLB,, | Performed by: NURSE PRACTITIONER

## 2023-05-05 PROCEDURE — 1160F PR REVIEW ALL MEDS BY PRESCRIBER/CLIN PHARMACIST DOCUMENTED: ICD-10-PCS | Mod: CPTII,S$GLB,, | Performed by: NURSE PRACTITIONER

## 2023-05-05 PROCEDURE — 1159F MED LIST DOCD IN RCRD: CPT | Mod: CPTII,S$GLB,, | Performed by: NURSE PRACTITIONER

## 2023-05-05 PROCEDURE — 99999 PR PBB SHADOW E&M-EST. PATIENT-LVL IV: ICD-10-PCS | Mod: PBBFAC,,, | Performed by: NURSE PRACTITIONER

## 2023-05-05 PROCEDURE — 1159F PR MEDICATION LIST DOCUMENTED IN MEDICAL RECORD: ICD-10-PCS | Mod: CPTII,S$GLB,, | Performed by: NURSE PRACTITIONER

## 2023-05-05 PROCEDURE — 99999 PR PBB SHADOW E&M-EST. PATIENT-LVL IV: CPT | Mod: PBBFAC,,, | Performed by: NURSE PRACTITIONER

## 2023-05-05 RX ORDER — IBUPROFEN 200 MG
200 TABLET ORAL EVERY 6 HOURS PRN
Status: ON HOLD | COMMUNITY
End: 2023-05-11 | Stop reason: HOSPADM

## 2023-05-05 NOTE — PROGRESS NOTES
"[facility]  Progress Note    Patient Name: Josef Sage  MRN: [id number]  Date of Evaluation- 05/08/2023  PCP- Primary Doctor No    Future cases for Josef Sage [[id number]]       Case ID Status Date Time James Procedure Provider Location    [id number] [facility] 5/10/2023  8:00  C5-6 ACDF Ivan Dalton MD [[id number]] [facility] OR 2ND FLR            HPI:  This is a 47 y.o. male  who presents today for a preoperative evaluation in preparation for a Neurosurgery  procedure.  Scheduled for  5/10/23  Surgery is indicated for ACDF.   Patient is new to me.  Details of current problem: The duration of problem has been present for over 12 years- worsened past 5 years   Reports symptoms of  pain, stiffness, scapular pain, pain radiates down right right arm to hand, has temperature change feelings "cold flashes" sensation changes to right hand  Aggravating factors include: prolonged standing, turning head to right side, quick head movements, any physical activity, no lifting or running    Relieving factors are  rest, therapy exercises, traction at home, hot showers,   right hand over head  Treated with CBD, ibuprofen  Reports pain: 9/10  The history has been obtained by speaking with the patient and reviewing the electronic medical record and/or outside health information. Significant health conditions for the perioperative period are discussed below in assessment and plan.     Patient reports current health status to be Fair.  Denies any new symptoms before surgery.       Subjective/ Objective:     Chief Complaint: Preoperative evaulation, perioperative medical management, and complication reduction plan.     Functional Capacity:  Able to climb a flight of stairs without CP SOB or Syncope.  Able to meet 4 METs       Anesthesia issues: None    Difficulty mouth opening: none    Steroid use in the last 12 months: prednisone for asthma     Dental Issues: none    Family anesthesia difficulty: None; mom had reaction to phenergan " "during eye procedure     Family Hx of Thrombosis mother had clot unknown origin in her finger    Past Medical History:   Diagnosis Date    Anxiety     Asthma     Bronchitis     GERD (gastroesophageal reflux disease)     Mixed hyperlipidemia 09/17/2019    Recurrent upper respiratory infection (URI)     Wheezes      Past Surgical History:   Procedure Laterality Date    MOUTH SURGERY      None         Review of Systems   Constitutional:  Positive for fatigue. Negative for activity change, appetite change, chills, diaphoresis, fever and unexpected weight change.   HENT:  Negative for congestion, dental problem, drooling, trouble swallowing and voice change.    Eyes:  Negative for photophobia and visual disturbance.        No acute visual changes   Respiratory:  Negative for apnea, cough, choking, chest tightness, shortness of breath, wheezing and stridor.         STOP bang  Score 3  High risk CARMELA     Cardiovascular:  Negative for chest pain, palpitations and leg swelling.   Gastrointestinal:  Negative for abdominal pain, blood in stool, constipation, diarrhea, nausea and vomiting.        No FLD, Hepatitis, Cirrhosis  No BRB or black tarry stool   Genitourinary:  Negative for difficulty urinating, dysuria, frequency, hematuria and urgency.   Musculoskeletal:  Positive for arthralgias, back pain, myalgias, neck pain and neck stiffness. Negative for gait problem.   Neurological:  Positive for weakness, numbness and headaches. Negative for dizziness, seizures, syncope and light-headedness.   Psychiatric/Behavioral:  Negative for suicidal ideas. The patient is not nervous/anxious.         VITALS  Visit Vitals  /87 (BP Location: Left arm, Patient Position: Sitting)   Pulse (!) 112   Temp 98.4 °F (36.9 °C) (Oral)   Ht 5' 10" (1.778 m)   Wt 90.7 kg (200 lb)   SpO2 96%   BMI 28.70 kg/m²      Physical Exam     Significant Labs:  Lab Results   Component Value Date    WBC 7.30 03/29/2023    HGB 14.7 03/29/2023    HCT 43.3 " 03/29/2023     03/29/2023    CHOL 253 (H) 09/14/2019    TRIG 311 (H) 09/14/2019    HDL 39 (L) 09/14/2019    ALT 29 09/14/2019    AST 18 09/14/2019     09/14/2019    K 4.4 09/14/2019     09/14/2019    CREATININE 1.02 09/14/2019    BUN 13 09/14/2019    CO2 26 09/14/2019       Diagnostic Studies: No relevant studies.    EKG:   Results for orders placed or performed in visit on 12/23/20   IN OFFICE EKG 12-LEAD (to [place])    Collection Time: 12/23/20  3:49 PM    Narrative    Test Reason : R55,    Vent. Rate : 089 BPM     Atrial Rate : 089 BPM     P-R Int : 124 ms          QRS Dur : 078 ms      QT Int : 362 ms       P-R-T Axes : 053 043 040 degrees     QTc Int : 440 ms    Normal sinus rhythm  Normal ECG  When compared with ECG of 25-FEB-2016 23:26,  No significant change was found  Confirmed by Justin Ghotra MD ([id number]) on 1/1/2021 4:05:38 PM    Referred By: JOEY AREVALO           Confirmed By:Justin Ghotra MD       2D ECHO:  TTE:  No results found for this or any previous visit.    DENISE:  No results found for this or any previous visit.     Imaging     Active Cardiac Conditions: None      Revised Cardiac Risk Index   High -Risk Surgery  Intraperitoneal; Intrathoracic; suprainguinal vascular Yes- + 1 No- 0   History of Ischemic Heart Disease   (Hx of MI/positive exercise test/current chest pain due to ischemia/use of nitrate therapy/EKG with pathological Q waves) Yes- + 1 No- 0   History of CHF  (Pulmonary edema/bilateral rales or S3 gallop/PND/CXR showing pulmonary vascular redistribution) Yes- + 1 No- 0   History of CVA   (Prior stroke or TIA) Yes- + 1 No- 0   Pre-operative treatment with insulin Yes- + 1 No- 0   Pre-operative creatinine > 2mg/dl Yes- + 1 No- 0   Total:      Risk Status:  Estimated risk of cardiac complications after non-cardiac surgery using the Revised Cardiac Risk Index for Preoperative risk is 3.9 %      ARISCAT (Canet) risk index: Low: 1.6% risk of post-op pulmonary  complications.    American Society of Anesthesiologists Physical Status classification (ASA): 2           No further cardiac workup needed prior to surgery.        Preoperative cardiac risk assessment-  The patient does not have any active cardiac conditions . Revised cardiac risk index predictors- ---.Functional capacity is more than 4 Mets. He will be undergoing a Orthopedic procedure that carries a Moderate Risk risk     The estimated risk of the rate of adverse cardiac outcomes  3.9    No further cardiac work up is indicated prior to proceeding with the surgery     Orders Placed This Encounter    Ambulatory referral/consult to Internal Medicine    EKG 12-lead       American Society of Anesthesiologists Physical status classification ( ASA ) class: 2     Postoperative pulmonary complication risk assessment: 1.6   ARISCAT ( Canet) risk index- risk class -  Low, if duration of surgery is under 3 hours, intermediate, if duration of surgery is over 3 hours       Assessment/Plan:     Alopecia  Takes propecia    Snoring  Patient reports concerns for sleep apnea- states he does not feel rested and has fatigue  Requested f/u with sleep medicine after surgery    This client has a possible diagnosis of obstructive sleep apnea (CARMELA)    STOPBANG score: 3    Instructions were given to avoid the following: sleeping supine, weight gain ,alcoholic beverages , sedative medications, and CNS depressant use as these can all worsen CARMELA.    Untreated sleep apnea has been shown to increase daytime sleepiness, hypertension, heart disease and stroke which were discussed with the patient at this time    An order for a sleep medicine cs has been placed    Please use caution with medications that induce respiratory depression in the perioperative period      Preventive perioperative care    Thromboembolic prophylaxis:  His risk factors for thrombosis include surgical procedure, age, and reduced mobility.I suggest  thromboembolic prophylaxis  ( mechanical/pharmacological, weighing the risk benefits of pharmacological agent use considering channing procedural bleeding )  during the perioperative period.I suggested being active in the post operative period.      Postoperative pulmonary complication prophylaxis-I suggest incentive spirometry use, early ambulation, and pain control so as to avoid diaphragmatic splinting  Brush teeth twice per day, oral rinses, sleep with the head of the bed up 30 degrees     Renal complication prophylaxis. I suggest keeping him well hydrated and avoidance/ minimizing the use of  NSAID's,REYES 2 Inhibitors ,IV contrast if possible in the perioperative period.I suggested drinking 2 litre's of water a day      Surgical site Infection Prophylaxis-I  suggest appropriate antibiotic for Prophylaxis against Surgical site infections Shower with Hibiclens in the night before surgery and the morning of surgery     This visit was focused on Preoperative evaluation, Perioperative Medical management, complication reduction plans. I suggest that the patient follows up with primary care or relevant sub specialists for ongoing health care.    I appreciate the opportunity to be involved in this patients care. Please feel free to contact me if there were any questions about this consultation.    Patient is optimized    I spent a total of 32 minutes on the day of the visit.  This includes face to face time and non-face to face time preparing to see the patient (eg, review of tests), obtaining and/or reviewing separately obtained history, documenting clinical information in the electronic or other health record, independently interpreting results and communicating results to the patient/family/caregiver, or care coordinator.     Bradley Dickerson NP  Perioperative Medicine  [facility]   Pager [phone]

## 2023-05-05 NOTE — DISCHARGE INSTRUCTIONS
Your surgery has been scheduled for:________5/10/23__________________________________    You should report to:  ____[facility], located on the [place] side of the first floor of the           [facility] ([phone])  __X__[facility], located on the [facility] side of the            Second floor of the [facility] ([phone])  ____3rd Floor [facility] located on the [facility] side of the [facility] [phone]  ____[facility]: located at [address] [facility] [place] [address]. [address]     Please Note   Tell your doctor if you take Aspirin, products containing Aspirin, herbal medications  or blood thinners, such as Coumadin, Ticlid, or Plavix.  (Consult your provider regarding holding or stopping before surgery).  Arrange for someone to drive you home following surgery.  You will not be allowed to leave the surgical facility alone or drive yourself home following sedation and anesthesia.    Before Surgery  Stop taking all herbal medications, vitamins, and supplements 7 days prior to surgery  No Motrin/Advil (Ibuprofen) 7 days before surgery  No Aleve (Naproxen) 7 days before surgery  Stop Taking Asprin, products containing Aspirin __7___days before surgery   No Goody's/BC Powder 7 days before surgery  Refrain from drinking alcoholic beverages for 24 hours before and after surgery  Stop or limit smoking at least 24 hours prior to surgery  You may take Tylenol for pain    Night before Surgery  Do not eat or drink after midnight  Take a shower or bath (shower is recommended).  Bathe with Hibiclens soap or an antibacterial soap from the neck down.  If not supplied by your surgeon, hibiclens soap will need to be purchased over the counter in pharmacy.  Rinse soap off thoroughly.  Shampoo your hair with your regular shampoo    The Day of Surgery  Take another bath or shower with hibiclens  or any antibacterial soap, to reduce the chance of infection.  Take heart and blood pressure medications with a small sip of water, as advised by the perioperative team.  Do not take fluid pills  You may brush your teeth and rinse your mouth, but do not swallow any additional water.   Do not apply perfumes, powder, body lotions or deodorant on the day of surgery.  Nail polish should be removed.  Do not wear makeup or moisturizer  Wear comfortable clothes, such as a button front shirt and loose fitting pants.  Leave all jewelry, including body piercings, and valuables at home.    Bring any devices you will neeed after surgery such as crutches or canes.  If you have sleep apnea, please bring your CPAP machine  In the event that your physical condition changes including the onset of a cold or respiratory illness, or if you have to delay or cancel your surgery, please notify your surgeon.

## 2023-05-05 NOTE — ASSESSMENT & PLAN NOTE
Patient reports concerns for sleep apnea- states he does not feel rested and has fatigue  Requested f/u with sleep medicine after surgery    This client has a possible diagnosis of obstructive sleep apnea (CARMELA)    STOPBANG score: 3    Instructions were given to avoid the following: sleeping supine, weight gain ,alcoholic beverages , sedative medications, and CNS depressant use as these can all worsen CARMELA.    Untreated sleep apnea has been shown to increase daytime sleepiness, hypertension, heart disease and stroke which were discussed with the patient at this time    An order for a sleep medicine cs has been placed    Please use caution with medications that induce respiratory depression in the perioperative period

## 2023-05-05 NOTE — OUTPATIENT SUBJECTIVE & OBJECTIVE
Outpatient Subjective & Objective      Chief Complaint: Preoperative evaulation, perioperative medical management, and complication reduction plan.     Functional Capacity:  Able to climb a flight of stairs without CP SOB or Syncope.  Able to meet 4 METs       Anesthesia issues: None    Difficulty mouth opening: none    Steroid use in the last 12 months: prednisone for asthma     Dental Issues: none    Family anesthesia difficulty: None; mom had reaction to phenergan during eye procedure     Family Hx of Thrombosis mother had clot unknown origin in her finger    Past Medical History:   Diagnosis Date    Anxiety     Asthma     Bronchitis     GERD (gastroesophageal reflux disease)     Mixed hyperlipidemia 09/17/2019    Recurrent upper respiratory infection (URI)     Wheezes      Past Surgical History:   Procedure Laterality Date    MOUTH SURGERY      None         Review of Systems   Constitutional:  Positive for fatigue. Negative for activity change, appetite change, chills, diaphoresis, fever and unexpected weight change.   HENT:  Negative for congestion, dental problem, drooling, trouble swallowing and voice change.    Eyes:  Negative for photophobia and visual disturbance.        No acute visual changes   Respiratory:  Negative for apnea, cough, choking, chest tightness, shortness of breath, wheezing and stridor.         STOP bang  Score 3  High risk CARMELA     Cardiovascular:  Negative for chest pain, palpitations and leg swelling.   Gastrointestinal:  Negative for abdominal pain, blood in stool, constipation, diarrhea, nausea and vomiting.        No FLD, Hepatitis, Cirrhosis  No BRB or black tarry stool   Genitourinary:  Negative for difficulty urinating, dysuria, frequency, hematuria and urgency.   Musculoskeletal:  Positive for arthralgias, back pain, myalgias, neck pain and neck stiffness. Negative for gait problem.   Neurological:  Positive for weakness, numbness and headaches. Negative for dizziness, seizures,  "syncope and light-headedness.   Psychiatric/Behavioral:  Negative for suicidal ideas. The patient is not nervous/anxious.         VITALS  Visit Vitals  /87 (BP Location: Left arm, Patient Position: Sitting)   Pulse (!) 112   Temp 98.4 °F (36.9 °C) (Oral)   Ht 5' 10" (1.778 m)   Wt 90.7 kg (200 lb)   SpO2 96%   BMI 28.70 kg/m²      Physical Exam     Significant Labs:  Lab Results   Component Value Date    WBC 7.30 03/29/2023    HGB 14.7 03/29/2023    HCT 43.3 03/29/2023     03/29/2023    CHOL 253 (H) 09/14/2019    TRIG 311 (H) 09/14/2019    HDL 39 (L) 09/14/2019    ALT 29 09/14/2019    AST 18 09/14/2019     09/14/2019    K 4.4 09/14/2019     09/14/2019    CREATININE 1.02 09/14/2019    BUN 13 09/14/2019    CO2 26 09/14/2019       Diagnostic Studies: No relevant studies.    EKG:   Results for orders placed or performed in visit on 12/23/20   IN OFFICE EKG 12-LEAD (to [place])    Collection Time: 12/23/20  3:49 PM    Narrative    Test Reason : R55,    Vent. Rate : 089 BPM     Atrial Rate : 089 BPM     P-R Int : 124 ms          QRS Dur : 078 ms      QT Int : 362 ms       P-R-T Axes : 053 043 040 degrees     QTc Int : 440 ms    Normal sinus rhythm  Normal ECG  When compared with ECG of 25-FEB-2016 23:26,  No significant change was found  Confirmed by Justin Ghotra MD ([id number]) on 1/1/2021 4:05:38 PM    Referred By: JOEY AREVALO           Confirmed By:Justin Ghotra MD       2D ECHO:  TTE:  No results found for this or any previous visit.    DENISE:  No results found for this or any previous visit.     Imaging     Active Cardiac Conditions: None      Revised Cardiac Risk Index   High -Risk Surgery  Intraperitoneal; Intrathoracic; suprainguinal vascular Yes- + 1 No- 0   History of Ischemic Heart Disease   (Hx of MI/positive exercise test/current chest pain due to ischemia/use of nitrate therapy/EKG with pathological Q waves) Yes- + 1 No- 0   History of CHF  (Pulmonary edema/bilateral rales or S3 " gallop/PND/CXR showing pulmonary vascular redistribution) Yes- + 1 No- 0   History of CVA   (Prior stroke or TIA) Yes- + 1 No- 0   Pre-operative treatment with insulin Yes- + 1 No- 0   Pre-operative creatinine > 2mg/dl Yes- + 1 No- 0   Total:      Risk Status:  Estimated risk of cardiac complications after non-cardiac surgery using the Revised Cardiac Risk Index for Preoperative risk is 3.9 %      ARISCAT (Canet) risk index: Low: 1.6% risk of post-op pulmonary complications.    American Society of Anesthesiologists Physical Status classification (ASA): 2           No further cardiac workup needed prior to surgery.    Outpatient Subjective & Objective

## 2023-05-05 NOTE — HPI
"This is a 47 y.o. male  who presents today for a preoperative evaluation in preparation for a Neurosurgery  procedure.  Scheduled for  5/10/23  Surgery is indicated for ACDF.   Patient is new to me.  Details of current problem: The duration of problem has been present for over 12 years- worsened past 5 years   Reports symptoms of  pain, stiffness, scapular pain, pain radiates down right right arm to hand, has temperature change feelings "cold flashes" sensation changes to right hand  Aggravating factors include: prolonged standing, turning head to right side, quick head movements, any physical activity, no lifting or running    Relieving factors are  rest, therapy exercises, traction at home, hot showers,   right hand over head  Treated with CBD, ibuprofen  Reports pain: 9/10  The history has been obtained by speaking with the patient and reviewing the electronic medical record and/or outside health information. Significant health conditions for the perioperative period are discussed below in assessment and plan.     Patient reports current health status to be Fair.  Denies any new symptoms before surgery.   "

## 2023-05-09 ENCOUNTER — ANESTHESIA EVENT (OUTPATIENT)
Dept: SURGERY | Facility: HOSPITAL | Age: 48
End: 2023-05-09
Payer: COMMERCIAL

## 2023-05-09 ENCOUNTER — TELEPHONE (OUTPATIENT)
Dept: NEUROSURGERY | Facility: CLINIC | Age: 48
End: 2023-05-09
Payer: COMMERCIAL

## 2023-05-09 NOTE — TELEPHONE ENCOUNTER
P/c to pt. With pre op instructions.  Instructed pt. To remain npo after midnight. To bathe in either hibicleanse or dial antibacterial soap the night before and morning of surgery and to arrive tomorrow 5/10 at 6am 2nd floor day of surgery.  Pt. Verbalized understanding of above.

## 2023-05-10 ENCOUNTER — ANESTHESIA (OUTPATIENT)
Dept: SURGERY | Facility: HOSPITAL | Age: 48
End: 2023-05-10
Payer: COMMERCIAL

## 2023-05-10 ENCOUNTER — HOSPITAL ENCOUNTER (OUTPATIENT)
Facility: HOSPITAL | Age: 48
Discharge: HOME | End: 2023-05-11
Attending: NEUROLOGICAL SURGERY | Admitting: NEUROLOGICAL SURGERY
Payer: COMMERCIAL

## 2023-05-10 DIAGNOSIS — M48.02 CERVICAL SPINAL STENOSIS: ICD-10-CM

## 2023-05-10 DIAGNOSIS — M47.22 CERVICAL SPONDYLOSIS WITH RADICULOPATHY: Primary | ICD-10-CM

## 2023-05-10 PROCEDURE — 25000003 PHARM REV CODE 250: Performed by: STUDENT IN AN ORGANIZED HEALTH CARE EDUCATION/TRAINING PROGRAM

## 2023-05-10 PROCEDURE — 25000003 PHARM REV CODE 250: Performed by: NURSE ANESTHETIST, CERTIFIED REGISTERED

## 2023-05-10 PROCEDURE — 20930 SP BONE ALGRFT MORSEL ADD-ON: CPT | Mod: ,,, | Performed by: NEUROLOGICAL SURGERY

## 2023-05-10 PROCEDURE — 71000015 HC POSTOP RECOV 1ST HR: Performed by: NEUROLOGICAL SURGERY

## 2023-05-10 PROCEDURE — 27201037 HC PRESSURE MONITORING SET UP

## 2023-05-10 PROCEDURE — 63600175 PHARM REV CODE 636 W HCPCS: Performed by: ANESTHESIOLOGY

## 2023-05-10 PROCEDURE — 71000033 HC RECOVERY, INTIAL HOUR: Performed by: NEUROLOGICAL SURGERY

## 2023-05-10 PROCEDURE — 27800903 OPTIME MED/SURG SUP & DEVICES OTHER IMPLANTS: Performed by: NEUROLOGICAL SURGERY

## 2023-05-10 PROCEDURE — 22853 INSJ BIOMECHANICAL DEVICE: CPT | Mod: ,,, | Performed by: NEUROLOGICAL SURGERY

## 2023-05-10 PROCEDURE — D9220A PRA ANESTHESIA: Mod: CRNA,,, | Performed by: NURSE ANESTHETIST, CERTIFIED REGISTERED

## 2023-05-10 PROCEDURE — D9220A PRA ANESTHESIA: Mod: ANES,,, | Performed by: ANESTHESIOLOGY

## 2023-05-10 PROCEDURE — 20930 PR ALLOGRAFT FOR SPINE SURGERY ONLY MORSELIZED: ICD-10-PCS | Mod: ,,, | Performed by: NEUROLOGICAL SURGERY

## 2023-05-10 PROCEDURE — C1713 ANCHOR/SCREW BN/BN,TIS/BN: HCPCS | Performed by: NEUROLOGICAL SURGERY

## 2023-05-10 PROCEDURE — 25000003 PHARM REV CODE 250: Performed by: NEUROLOGICAL SURGERY

## 2023-05-10 PROCEDURE — 27201423 OPTIME MED/SURG SUP & DEVICES STERILE SUPPLY: Performed by: NEUROLOGICAL SURGERY

## 2023-05-10 PROCEDURE — 63600175 PHARM REV CODE 636 W HCPCS: Performed by: NEUROLOGICAL SURGERY

## 2023-05-10 PROCEDURE — 37000009 HC ANESTHESIA EA ADD 15 MINS: Performed by: NEUROLOGICAL SURGERY

## 2023-05-10 PROCEDURE — 71000016 HC POSTOP RECOV ADDL HR: Performed by: NEUROLOGICAL SURGERY

## 2023-05-10 PROCEDURE — 36000710: Performed by: NEUROLOGICAL SURGERY

## 2023-05-10 PROCEDURE — 22853 PR INSERT BIOMECH DEV W/INTERBODY ARTHRODESIS, EA CONTIGUOUS DEFECT: ICD-10-PCS | Mod: ,,, | Performed by: NEUROLOGICAL SURGERY

## 2023-05-10 PROCEDURE — 99900035 HC TECH TIME PER 15 MIN (STAT)

## 2023-05-10 PROCEDURE — D9220A PRA ANESTHESIA: ICD-10-PCS | Mod: CRNA,,, | Performed by: NURSE ANESTHETIST, CERTIFIED REGISTERED

## 2023-05-10 PROCEDURE — 22551 ARTHRD ANT NTRBDY CERVICAL: CPT | Mod: ,,, | Performed by: NEUROLOGICAL SURGERY

## 2023-05-10 PROCEDURE — C1729 CATH, DRAINAGE: HCPCS | Performed by: NEUROLOGICAL SURGERY

## 2023-05-10 PROCEDURE — 63600175 PHARM REV CODE 636 W HCPCS: Performed by: STUDENT IN AN ORGANIZED HEALTH CARE EDUCATION/TRAINING PROGRAM

## 2023-05-10 PROCEDURE — 63600175 PHARM REV CODE 636 W HCPCS: Performed by: NURSE ANESTHETIST, CERTIFIED REGISTERED

## 2023-05-10 PROCEDURE — 37000008 HC ANESTHESIA 1ST 15 MINUTES: Performed by: NEUROLOGICAL SURGERY

## 2023-05-10 PROCEDURE — 36000711: Performed by: NEUROLOGICAL SURGERY

## 2023-05-10 PROCEDURE — D9220A PRA ANESTHESIA: ICD-10-PCS | Mod: ANES,,, | Performed by: ANESTHESIOLOGY

## 2023-05-10 PROCEDURE — 94761 N-INVAS EAR/PLS OXIMETRY MLT: CPT

## 2023-05-10 PROCEDURE — 22551 PR ARTHRODESIS ANT INTERBODY INC DISCECTOMY, CERVICAL BELOW C2: ICD-10-PCS | Mod: ,,, | Performed by: NEUROLOGICAL SURGERY

## 2023-05-10 DEVICE — PUTTY OSTEOSELECT IN SYR 1CC: Type: IMPLANTABLE DEVICE | Site: NECK | Status: FUNCTIONAL

## 2023-05-10 DEVICE — SPACER COALITION MIS 6X12X14MM: Type: IMPLANTABLE DEVICE | Site: NECK | Status: FUNCTIONAL

## 2023-05-10 DEVICE — SCREW BONE SPINAL 3.6X14MM: Type: IMPLANTABLE DEVICE | Site: NECK | Status: FUNCTIONAL

## 2023-05-10 RX ORDER — HYDROMORPHONE HYDROCHLORIDE 1 MG/ML
0.2 INJECTION, SOLUTION INTRAMUSCULAR; INTRAVENOUS; SUBCUTANEOUS EVERY 5 MIN PRN
Status: DISCONTINUED | OUTPATIENT
Start: 2023-05-10 | End: 2023-05-10 | Stop reason: HOSPADM

## 2023-05-10 RX ORDER — SODIUM CHLORIDE 0.9 % (FLUSH) 0.9 %
3 SYRINGE (ML) INJECTION
Status: DISCONTINUED | OUTPATIENT
Start: 2023-05-10 | End: 2023-05-10 | Stop reason: HOSPADM

## 2023-05-10 RX ORDER — MIDAZOLAM HYDROCHLORIDE 1 MG/ML
INJECTION, SOLUTION INTRAMUSCULAR; INTRAVENOUS
Status: DISCONTINUED | OUTPATIENT
Start: 2023-05-10 | End: 2023-05-10

## 2023-05-10 RX ORDER — AMOXICILLIN 250 MG
2 CAPSULE ORAL NIGHTLY PRN
Status: DISCONTINUED | OUTPATIENT
Start: 2023-05-10 | End: 2023-05-11 | Stop reason: HOSPADM

## 2023-05-10 RX ORDER — DIAZEPAM 5 MG/1
5 TABLET ORAL EVERY 6 HOURS PRN
Status: DISCONTINUED | OUTPATIENT
Start: 2023-05-10 | End: 2023-05-11 | Stop reason: HOSPADM

## 2023-05-10 RX ORDER — MUPIROCIN 20 MG/G
1 OINTMENT TOPICAL 2 TIMES DAILY
Status: DISCONTINUED | OUTPATIENT
Start: 2023-05-10 | End: 2023-05-10 | Stop reason: HOSPADM

## 2023-05-10 RX ORDER — ONDANSETRON 2 MG/ML
4 INJECTION INTRAMUSCULAR; INTRAVENOUS DAILY PRN
Status: DISCONTINUED | OUTPATIENT
Start: 2023-05-10 | End: 2023-05-10 | Stop reason: HOSPADM

## 2023-05-10 RX ORDER — ONDANSETRON 2 MG/ML
INJECTION INTRAMUSCULAR; INTRAVENOUS
Status: DISCONTINUED | OUTPATIENT
Start: 2023-05-10 | End: 2023-05-10

## 2023-05-10 RX ORDER — DEXAMETHASONE SODIUM PHOSPHATE 4 MG/ML
INJECTION, SOLUTION INTRA-ARTICULAR; INTRALESIONAL; INTRAMUSCULAR; INTRAVENOUS; SOFT TISSUE
Status: DISCONTINUED | OUTPATIENT
Start: 2023-05-10 | End: 2023-05-10

## 2023-05-10 RX ORDER — VANCOMYCIN HYDROCHLORIDE 1 G/20ML
INJECTION, POWDER, LYOPHILIZED, FOR SOLUTION INTRAVENOUS
Status: DISCONTINUED | OUTPATIENT
Start: 2023-05-10 | End: 2023-05-10 | Stop reason: HOSPADM

## 2023-05-10 RX ORDER — ONDANSETRON 8 MG/1
8 TABLET, ORALLY DISINTEGRATING ORAL EVERY 6 HOURS PRN
Status: DISCONTINUED | OUTPATIENT
Start: 2023-05-10 | End: 2023-05-11 | Stop reason: HOSPADM

## 2023-05-10 RX ORDER — PROPOFOL 10 MG/ML
VIAL (ML) INTRAVENOUS CONTINUOUS PRN
Status: DISCONTINUED | OUTPATIENT
Start: 2023-05-10 | End: 2023-05-10

## 2023-05-10 RX ORDER — ACETAMINOPHEN 500 MG
1000 TABLET ORAL EVERY 8 HOURS
Status: DISCONTINUED | OUTPATIENT
Start: 2023-05-10 | End: 2023-05-11 | Stop reason: HOSPADM

## 2023-05-10 RX ORDER — SUCCINYLCHOLINE CHLORIDE 20 MG/ML
INJECTION INTRAMUSCULAR; INTRAVENOUS
Status: DISCONTINUED | OUTPATIENT
Start: 2023-05-10 | End: 2023-05-10

## 2023-05-10 RX ORDER — SODIUM CHLORIDE 9 MG/ML
INJECTION, SOLUTION INTRAVENOUS CONTINUOUS
Status: DISCONTINUED | OUTPATIENT
Start: 2023-05-10 | End: 2023-05-10

## 2023-05-10 RX ORDER — MUPIROCIN 20 MG/G
OINTMENT TOPICAL
Status: DISCONTINUED | OUTPATIENT
Start: 2023-05-10 | End: 2023-05-10 | Stop reason: HOSPADM

## 2023-05-10 RX ORDER — LIDOCAINE HYDROCHLORIDE 20 MG/ML
INJECTION INTRAVENOUS
Status: DISCONTINUED | OUTPATIENT
Start: 2023-05-10 | End: 2023-05-10

## 2023-05-10 RX ORDER — OXYCODONE HYDROCHLORIDE 10 MG/1
10 TABLET ORAL EVERY 4 HOURS PRN
Status: DISCONTINUED | OUTPATIENT
Start: 2023-05-10 | End: 2023-05-11 | Stop reason: HOSPADM

## 2023-05-10 RX ORDER — MUPIROCIN 20 MG/G
OINTMENT TOPICAL 2 TIMES DAILY
Status: DISCONTINUED | OUTPATIENT
Start: 2023-05-10 | End: 2023-05-10

## 2023-05-10 RX ORDER — ESCITALOPRAM OXALATE 10 MG/1
10 TABLET ORAL NIGHTLY
Status: DISCONTINUED | OUTPATIENT
Start: 2023-05-10 | End: 2023-05-11 | Stop reason: HOSPADM

## 2023-05-10 RX ORDER — HYDROMORPHONE HYDROCHLORIDE 1 MG/ML
INJECTION, SOLUTION INTRAMUSCULAR; INTRAVENOUS; SUBCUTANEOUS
Status: DISCONTINUED | OUTPATIENT
Start: 2023-05-10 | End: 2023-05-10

## 2023-05-10 RX ORDER — BISACODYL 10 MG
10 SUPPOSITORY, RECTAL RECTAL DAILY
Status: DISCONTINUED | OUTPATIENT
Start: 2023-05-10 | End: 2023-05-11 | Stop reason: HOSPADM

## 2023-05-10 RX ORDER — MAG HYDROX/ALUMINUM HYD/SIMETH 200-200-20
30 SUSPENSION, ORAL (FINAL DOSE FORM) ORAL EVERY 4 HOURS PRN
Status: DISCONTINUED | OUTPATIENT
Start: 2023-05-10 | End: 2023-05-11 | Stop reason: HOSPADM

## 2023-05-10 RX ORDER — MORPHINE SULFATE 2 MG/ML
2 INJECTION, SOLUTION INTRAMUSCULAR; INTRAVENOUS
Status: DISCONTINUED | OUTPATIENT
Start: 2023-05-10 | End: 2023-05-11 | Stop reason: HOSPADM

## 2023-05-10 RX ORDER — MIDAZOLAM HYDROCHLORIDE 1 MG/ML
1 INJECTION INTRAMUSCULAR; INTRAVENOUS EVERY 5 MIN PRN
Status: DISCONTINUED | OUTPATIENT
Start: 2023-05-10 | End: 2023-05-10 | Stop reason: HOSPADM

## 2023-05-10 RX ORDER — METHYLPREDNISOLONE ACETATE 40 MG/ML
INJECTION, SUSPENSION INTRA-ARTICULAR; INTRALESIONAL; INTRAMUSCULAR; SOFT TISSUE
Status: DISCONTINUED | OUTPATIENT
Start: 2023-05-10 | End: 2023-05-10 | Stop reason: HOSPADM

## 2023-05-10 RX ORDER — KETAMINE HCL IN 0.9 % NACL 50 MG/5 ML
SYRINGE (ML) INTRAVENOUS
Status: DISCONTINUED | OUTPATIENT
Start: 2023-05-10 | End: 2023-05-10

## 2023-05-10 RX ORDER — PROPOFOL 10 MG/ML
VIAL (ML) INTRAVENOUS
Status: DISCONTINUED | OUTPATIENT
Start: 2023-05-10 | End: 2023-05-10

## 2023-05-10 RX ORDER — ACETAMINOPHEN 500 MG
1000 TABLET ORAL EVERY 8 HOURS
Status: DISCONTINUED | OUTPATIENT
Start: 2023-05-10 | End: 2023-05-10

## 2023-05-10 RX ORDER — OXYCODONE HYDROCHLORIDE 5 MG/1
5 TABLET ORAL EVERY 4 HOURS PRN
Status: DISCONTINUED | OUTPATIENT
Start: 2023-05-10 | End: 2023-05-11 | Stop reason: HOSPADM

## 2023-05-10 RX ORDER — PROCHLORPERAZINE EDISYLATE 5 MG/ML
5 INJECTION INTRAMUSCULAR; INTRAVENOUS EVERY 6 HOURS PRN
Status: DISCONTINUED | OUTPATIENT
Start: 2023-05-10 | End: 2023-05-11 | Stop reason: HOSPADM

## 2023-05-10 RX ORDER — LIDOCAINE HYDROCHLORIDE AND EPINEPHRINE 10; 10 MG/ML; UG/ML
INJECTION, SOLUTION INFILTRATION; PERINEURAL
Status: DISCONTINUED | OUTPATIENT
Start: 2023-05-10 | End: 2023-05-10 | Stop reason: HOSPADM

## 2023-05-10 RX ORDER — ACETAMINOPHEN 10 MG/ML
INJECTION, SOLUTION INTRAVENOUS
Status: DISCONTINUED | OUTPATIENT
Start: 2023-05-10 | End: 2023-05-10

## 2023-05-10 RX ORDER — FENTANYL CITRATE 50 UG/ML
INJECTION, SOLUTION INTRAMUSCULAR; INTRAVENOUS
Status: DISCONTINUED | OUTPATIENT
Start: 2023-05-10 | End: 2023-05-10

## 2023-05-10 RX ORDER — HALOPERIDOL 5 MG/ML
0.5 INJECTION INTRAMUSCULAR EVERY 10 MIN PRN
Status: DISCONTINUED | OUTPATIENT
Start: 2023-05-10 | End: 2023-05-10 | Stop reason: HOSPADM

## 2023-05-10 RX ORDER — ROCURONIUM BROMIDE 10 MG/ML
INJECTION, SOLUTION INTRAVENOUS
Status: DISCONTINUED | OUTPATIENT
Start: 2023-05-10 | End: 2023-05-10

## 2023-05-10 RX ORDER — OXYCODONE AND ACETAMINOPHEN 5; 325 MG/1; MG/1
1 TABLET ORAL
Status: DISCONTINUED | OUTPATIENT
Start: 2023-05-10 | End: 2023-05-10 | Stop reason: HOSPADM

## 2023-05-10 RX ORDER — SODIUM CHLORIDE 9 MG/ML
INJECTION, SOLUTION INTRAVENOUS CONTINUOUS
Status: DISCONTINUED | OUTPATIENT
Start: 2023-05-10 | End: 2023-05-11

## 2023-05-10 RX ADMIN — MORPHINE SULFATE 2 MG: 2 INJECTION, SOLUTION INTRAMUSCULAR; INTRAVENOUS at 09:05

## 2023-05-10 RX ADMIN — HYDROMORPHONE HYDROCHLORIDE 0.2 MG: 1 INJECTION, SOLUTION INTRAMUSCULAR; INTRAVENOUS; SUBCUTANEOUS at 11:05

## 2023-05-10 RX ADMIN — SODIUM CHLORIDE, SODIUM GLUCONATE, SODIUM ACETATE, POTASSIUM CHLORIDE, MAGNESIUM CHLORIDE, SODIUM PHOSPHATE, DIBASIC, AND POTASSIUM PHOSPHATE: .53; .5; .37; .037; .03; .012; .00082 INJECTION, SOLUTION INTRAVENOUS at 08:05

## 2023-05-10 RX ADMIN — SODIUM CHLORIDE 0.2 MCG/KG/MIN: 9 INJECTION, SOLUTION INTRAVENOUS at 08:05

## 2023-05-10 RX ADMIN — CEFAZOLIN 2 G: 2 INJECTION, POWDER, FOR SOLUTION INTRAMUSCULAR; INTRAVENOUS at 08:05

## 2023-05-10 RX ADMIN — ONDANSETRON 4 MG: 2 INJECTION INTRAMUSCULAR; INTRAVENOUS at 10:05

## 2023-05-10 RX ADMIN — GLYCOPYRROLATE 0.2 MG: 0.2 INJECTION INTRAMUSCULAR; INTRAVENOUS at 09:05

## 2023-05-10 RX ADMIN — OXYCODONE HYDROCHLORIDE 10 MG: 10 TABLET ORAL at 08:05

## 2023-05-10 RX ADMIN — MUPIROCIN: 20 OINTMENT TOPICAL at 06:05

## 2023-05-10 RX ADMIN — MORPHINE SULFATE 2 MG: 2 INJECTION, SOLUTION INTRAMUSCULAR; INTRAVENOUS at 05:05

## 2023-05-10 RX ADMIN — DEXAMETHASONE SODIUM PHOSPHATE 8 MG: 4 INJECTION, SOLUTION INTRAMUSCULAR; INTRAVENOUS at 08:05

## 2023-05-10 RX ADMIN — SUCCINYLCHOLINE CHLORIDE 140 MG: 20 INJECTION, SOLUTION INTRAMUSCULAR; INTRAVENOUS at 08:05

## 2023-05-10 RX ADMIN — ACETAMINOPHEN 1000 MG: 500 TABLET ORAL at 05:05

## 2023-05-10 RX ADMIN — OXYCODONE HYDROCHLORIDE 10 MG: 10 TABLET ORAL at 01:05

## 2023-05-10 RX ADMIN — Medication 50 MG: at 08:05

## 2023-05-10 RX ADMIN — PROPOFOL 200 MG: 10 INJECTION, EMULSION INTRAVENOUS at 08:05

## 2023-05-10 RX ADMIN — SODIUM CHLORIDE: 0.9 INJECTION, SOLUTION INTRAVENOUS at 08:05

## 2023-05-10 RX ADMIN — ACETAMINOPHEN 1000 MG: 500 TABLET ORAL at 10:05

## 2023-05-10 RX ADMIN — DIAZEPAM 5 MG: 5 TABLET ORAL at 10:05

## 2023-05-10 RX ADMIN — ROCURONIUM BROMIDE 5 MG: 10 INJECTION INTRAVENOUS at 08:05

## 2023-05-10 RX ADMIN — MIDAZOLAM HYDROCHLORIDE 2 MG: 1 INJECTION, SOLUTION INTRAMUSCULAR; INTRAVENOUS at 08:05

## 2023-05-10 RX ADMIN — Medication 150 MCG/KG/MIN: at 08:05

## 2023-05-10 RX ADMIN — ESCITALOPRAM OXALATE 10 MG: 10 TABLET ORAL at 08:05

## 2023-05-10 RX ADMIN — ONDANSETRON 4 MG: 2 INJECTION INTRAMUSCULAR; INTRAVENOUS at 01:05

## 2023-05-10 RX ADMIN — ACETAMINOPHEN 1000 MG: 10 INJECTION INTRAVENOUS at 09:05

## 2023-05-10 RX ADMIN — SODIUM CHLORIDE 0.25 MCG/KG/MIN: 9 INJECTION, SOLUTION INTRAVENOUS at 08:05

## 2023-05-10 RX ADMIN — CEFAZOLIN 2 G: 2 INJECTION, POWDER, FOR SOLUTION INTRAMUSCULAR; INTRAVENOUS at 05:05

## 2023-05-10 RX ADMIN — HYDROMORPHONE HYDROCHLORIDE 0.2 MG: 1 INJECTION, SOLUTION INTRAMUSCULAR; INTRAVENOUS; SUBCUTANEOUS at 12:05

## 2023-05-10 RX ADMIN — SODIUM CHLORIDE: 9 INJECTION, SOLUTION INTRAVENOUS at 12:05

## 2023-05-10 RX ADMIN — LIDOCAINE HYDROCHLORIDE 100 MG: 20 INJECTION INTRAVENOUS at 08:05

## 2023-05-10 RX ADMIN — FENTANYL CITRATE 100 MCG: 50 INJECTION, SOLUTION INTRAMUSCULAR; INTRAVENOUS at 08:05

## 2023-05-10 RX ADMIN — HYDROMORPHONE HYDROCHLORIDE 1 MG: 1 INJECTION, SOLUTION INTRAMUSCULAR; INTRAVENOUS; SUBCUTANEOUS at 11:05

## 2023-05-10 RX ADMIN — ONDANSETRON 8 MG: 8 TABLET, ORALLY DISINTEGRATING ORAL at 06:05

## 2023-05-10 NOTE — TRANSFER OF CARE
Anesthesia Transfer of Care Note    Patient: Josef Sage    Procedure(s) Performed: Procedure(s) (LRB):  C5-6 ACDF (N/A)    Patient location: PACU    Anesthesia Type: general    Transport from OR: Transported from OR on 6-10 L/min O2 by face mask with adequate spontaneous ventilation    Post pain: adequate analgesia    Post assessment: no apparent anesthetic complications and tolerated procedure well    Post vital signs: stable    Level of consciousness: sedated    Nausea/Vomiting: no nausea/vomiting    Complications: none    Transfer of care protocol was followed      Last vitals:   Visit Vitals  /60 (BP Location: Left arm, Patient Position: Lying)   Pulse 73   Temp 36.4 °C (97.5 °F) (Temporal)   Resp 14   SpO2 98%

## 2023-05-10 NOTE — ANESTHESIA PROCEDURE NOTES
Intubation    Date/Time: 5/10/2023 8:19 AM  Performed by: Rachel Alonso CRNA  Authorized by: Abhay Patel Jr., MD     Intubation:     Induction:  Intravenous    Intubated:  Postinduction    Mask Ventilation:  Easy mask    Attempts:  1    Attempted By:  CRNA    Method of Intubation:  Video laryngoscopy    Blade:  [place] 3    Laryngeal View Grade: Grade I - full view of cords      Difficult Airway Encountered?: No      Complications:  None    Airway Device:  Oral endotracheal tube    Airway Device Size:  7.5    Style/Cuff Inflation:  Cuffed (inflated to minimal occlusive pressure)    Tube secured:  21    Secured at:  The lips    Placement Verified By:  Capnometry    Complicating Factors:  None    Findings Post-Intubation:  BS equal bilateral and atraumatic/condition of teeth unchanged

## 2023-05-10 NOTE — NURSING TRANSFER
Nursing Transfer Note      5/10/2023     Reason patient is being transferred: post procedure    Transfer To: 909    Transfer via stretcher    Transfer with n/a    Transported by transport        Medicines sent: n/a    Any special needs or follow-up needed: c spine x ray on route to room    Chart send with patient: Yes    Notified: sister and mom    Patient reassessed at: 5/10/23 @ 0813

## 2023-05-10 NOTE — ANESTHESIA PREPROCEDURE EVALUATION
05/10/2023  Josef Sage is a 47 y.o., male.    Procedure: C5-6 ACDF (Spine Cervical) - C5-6 ACDF   anesthesia: general   nerve mon: EMG,SEP, MEP   radiology; C-ARM   bed: regular slider   headrest: caspar, MINDI tongs/hanging weights/ surgical pillow   misc; interbody paddle distractor, nerve root retractor (depuy)    Pre-operative evaluation for Procedure(s) (LRB):  C5-6 ACDF (N/A)    @[id number]@@    Encounter Diagnosis   Name Primary?    Cervical spinal stenosis        Review of patient's allergies indicates:   Allergen Reactions    Sulfa (sulfonamide antibiotics) Rash       Medications Prior to Admission   Medication Sig Dispense Refill Last Dose    albuterol (PROVENTIL/VENTOLIN HFA) 90 mcg/actuation inhaler Inhale 1-2 puffs into the lungs every 6 (six) hours as needed for Wheezing. 6.7 g 1     calcium carbonate/vitamin D3 (VITAMIN D-3 ORAL)        EScitalopram oxalate (LEXAPRO) 10 MG tablet Take 10 mg by mouth every evening.       fluticasone propionate (FLONASE) 50 mcg/actuation nasal spray 2 sprays (100 mcg total) by Each Nostril route 2 (two) times daily. 16 g 11     ibuprofen (ADVIL,MOTRIN) 200 MG tablet Take 200 mg by mouth every 6 (six) hours as needed for Pain.       multivitamin capsule Take 1 capsule by mouth once daily.       PROPECIA 1 mg tablet Take 1 tablet (1 mg total) by mouth once daily. 30 tablet 2          sodium chloride 0.9%         No current facility-administered medications on file prior to encounter.     Current Outpatient Medications on File Prior to Encounter   Medication Sig Dispense Refill    albuterol (PROVENTIL/VENTOLIN HFA) 90 mcg/actuation inhaler Inhale 1-2 puffs into the lungs every 6 (six) hours as needed for Wheezing. 6.7 g 1    EScitalopram oxalate (LEXAPRO) 10 MG tablet Take 10 mg by mouth every evening.      fluticasone propionate (FLONASE) 50  mcg/actuation nasal spray 2 sprays (100 mcg total) by Each Nostril route 2 (two) times daily. 16 g 11    PROPECIA 1 mg tablet Take 1 tablet (1 mg total) by mouth once daily. 30 tablet 2       Past Medical History:  No date: Anxiety  No date: Asthma  No date: Bronchitis  No date: GERD (gastroesophageal reflux disease)  09/17/2019: Mixed hyperlipidemia  No date: Recurrent upper respiratory infection (URI)  No date: Wheezes    Past Surgical History:   Procedure Laterality Date    MOUTH SURGERY      None         Social History     Tobacco Use   Smoking Status Never   Smokeless Tobacco Never       Social History     Substance and Sexual Activity   Alcohol Use Never    Alcohol/week: 1.0 standard drink    Types: 1 Glasses of wine per week       Physical Activity: Not on file         No results for input(s): HCT in the last 72 hours.  No results for input(s): PLT in the last 72 hours.  No results for input(s): K in the last 72 hours.  No results for input(s): CREATININE in the last 72 hours.  No results for input(s): GLU in the last 72 hours.  No results for input(s): PT in the last 72 hours.                    Pre-op Assessment          Review of Systems  Anesthesia Hx:  No problems with previous Anesthesia  Denies Family Hx of Anesthesia complications.   Denies Personal Hx of Anesthesia complications.   Social:  Non-Smoker, No Alcohol Use    Hematology/Oncology:  Hematology Normal   Oncology Normal     EENT/Dental:   chronic allergic rhinitis RECURRENT SINUSITIS AND RECURRENT URI- LAST ONE MONTH AGO PER PATIENT AS OF 4/21/2023   Cardiovascular:  Cardiovascular Normal  Denies Hypertension.  Denies MI.    Denies Angina. Runs a lime on occasion before injury Functional Capacity 4 METS, ABLE TO CLIMB 12 FLIGHTS OF STAIRS    Pulmonary:   Denies COPD. Asthma (only meds albuterol once or twice a month.)  Denies Shortness of breath. H/O BRONCHITIS PER Epic LIST OF PROBLEMS Denies Pulmonary Symptoms.  Asthma:  last episode  was > 1 year ago. Emergency visits this year is none.  Inhaler use is rescue inhaler PRN.    Renal/:  Renal/ Normal  Denies Chronic Renal Disease.     Hepatic/GI:   GERD Denies Liver Disease.    Musculoskeletal:  Musculoskeletal General/Symptoms: Functional capacity is ambulatory without assistance.  Cervical Spine Disorder, Cervical Disc Disease, Radiculopathy, Myelopathy CERVICAL SPONDYLOSIS   Neurological:   Denies TIA. Denies CVA. Denies Seizures.  Neuro Symptoms of pain OF NECK AND  NUMBNESS AND TINGLING OF RIGHT ARM TO ELBOW  Endocrine:   Denies Diabetes.        Physical Exam  General: Well nourished, Cooperative, Alert and Oriented    Airway:  Mallampati: II   Mouth Opening: Normal  TM Distance: Normal  Tongue: Normal  Neck ROM: Normal ROM          Anesthesia Assessment: Preoperative EQUATION    Planned Procedure: Procedure(s) (LRB):  C5-6 ACDF (N/A)  Requested Anesthesia Type:General  Surgeon: Ivan Dalton MD  Service: Neurosurgery  Known or anticipated Date of Surgery:5/10/2023    Surgeon notes: reviewed    Electronic QUestionnaire Assessment completed via nurse interview with patient.        Triage considerations:     The patient has no apparent active cardiac condition (No unstable coronary Syndrome such as severe unstable angina or recent [<1 month] myocardial infarction, decompensated CHF, severe valvular   disease or significant arrhythmia)    Previous anesthesia records:No problems and Not available   ** HE HAD A MOUTH SURGERY**    Last PCP note:  NO PCP  Subspecialty notes: Allergy, ENT, Neurosurgery, Spine Service    Other important co-morbidities: HLD and CERVICAL SPONDYLOSIS WITH MYELOPATHY AND RADICULOPATHY       Tests already available:  Available tests,  within 3 months , [facility]   4/3/2023 XRAY CERVICAL SPINE AP/LAT W F/E & SWIMMERS F/E W/O ODONTOID, 3/29/2023 CBC          Instructions given. (See in Nurse's note)    Optimization:  Anesthesia Preop Clinic Assessment  - Not  Indicated for this surgery    Medical Opinion Indicated           Plan:    Testing:  BMP, PT/INR, and T&S        Consultation:IM Perioperative Hospitalist     Patient  has previously scheduled Medical Appointment:5/5 MRI and  BRADLEY DICKERSON NP    Navigation: Tests Scheduled. TBD             Consults scheduled.5/5             Results will be tracked by Preop Clinic.  5/9 Labs resulted and noted by Dr. Sy.  Medical optimization by Bradley Dickerson NP on 5/8.  Jennyfer Bull RN BSN      Anesthesia Plan  Type of Anesthesia, risks & benefits discussed:    Anesthesia Type: Gen ETT  Intra-op Monitoring Plan: Standard ASA Monitors  Post Op Pain Control Plan: multimodal analgesia and IV/PO Opioids PRN  Induction:  IV  Informed Consent: Informed consent signed with the Patient and all parties understand the risks and agree with anesthesia plan.  All questions answered.   ASA Score: 2  Day of Surgery Review of History & Physical: H&P Update referred to the surgeon/provider.    Ready For Surgery From Anesthesia Perspective.       .

## 2023-05-10 NOTE — PLAN OF CARE
Preop care interventions completed and pt. Is prepped for intraop.  Procedural consent remains to be signed.

## 2023-05-10 NOTE — BRIEF OP NOTE
[facility] - [facility] (2nd Fl)  Brief Operative Note    SUMMARY     Surgery Date: 5/10/2023     Surgeon(s) and Role:     * Ivan Dalton MD - Primary     * Christian Chacko MD - Resident - Assisting        Pre-op Diagnosis:  Spondylosis, cervical, with myelopathy [M47.12]  Cervical spondylosis with radiculopathy [M47.22]    Post-op Diagnosis:  Post-Op Diagnosis Codes:     * Spondylosis, cervical, with myelopathy [M47.12]     * Cervical spondylosis with radiculopathy [M47.22]    Procedure(s) (LRB):  C5-6 ACDF (N/A)    Anesthesia: General    Operative Findings:   C5-C6 ACDF    Estimated Blood Loss:   20cc         Specimens:   Specimen (24h ago, onward)      None            [id number]

## 2023-05-10 NOTE — ANESTHESIA POSTPROCEDURE EVALUATION
Anesthesia Post Evaluation    Patient: Josef Sage    Procedure(s) Performed: Procedure(s) (LRB):  C5-6 ACDF (N/A)    Final Anesthesia Type: general      Patient location during evaluation: PACU  Patient participation: Yes- Able to Participate  Level of consciousness: awake and alert and oriented  Post-procedure vital signs: reviewed and stable  Pain management: adequate  Airway patency: patent    PONV status at discharge: No PONV  Anesthetic complications: no      Cardiovascular status: stable  Respiratory status: unassisted, spontaneous ventilation and room air  Hydration status: euvolemic  Follow-up not needed.          Vitals Value Taken Time   /74 05/10/23 1321   Temp 36.4 °C (97.5 °F) 05/10/23 1113   Pulse 68 05/10/23 1323   Resp 8 05/10/23 1323   SpO2 93 % 05/10/23 1323   Vitals shown include unvalidated device data.      Event Time   Out of Recovery 11:45:00         Pain/Ximena Score: Pain Rating Prior to Med Admin: 8 (5/10/2023  1:20 PM)  Ximena Score: 9 (5/10/2023 11:45 AM)

## 2023-05-11 VITALS
TEMPERATURE: 98 F | OXYGEN SATURATION: 95 % | HEART RATE: 62 BPM | RESPIRATION RATE: 16 BRPM | DIASTOLIC BLOOD PRESSURE: 72 MMHG | SYSTOLIC BLOOD PRESSURE: 135 MMHG

## 2023-05-11 LAB
ANION GAP SERPL CALC-SCNC: 7 MMOL/L (ref 8–16)
BASOPHILS # BLD AUTO: 0.01 K/UL (ref 0–0.2)
BASOPHILS NFR BLD: 0.1 % (ref 0–1.9)
BUN SERPL-MCNC: 12 MG/DL (ref 6–20)
CALCIUM SERPL-MCNC: 8.5 MG/DL (ref 8.7–10.5)
CHLORIDE SERPL-SCNC: 106 MMOL/L (ref 95–110)
CO2 SERPL-SCNC: 23 MMOL/L (ref 23–29)
CREAT SERPL-MCNC: 0.8 MG/DL (ref 0.5–1.4)
DIFFERENTIAL METHOD: ABNORMAL
EOSINOPHIL # BLD AUTO: 0 K/UL (ref 0–0.5)
EOSINOPHIL NFR BLD: 0 % (ref 0–8)
ERYTHROCYTE [DISTWIDTH] IN BLOOD BY AUTOMATED COUNT: 12.2 % (ref 11.5–14.5)
EST. GFR  (NO RACE VARIABLE): >60 ML/MIN/1.73 M^2
GLUCOSE SERPL-MCNC: 115 MG/DL (ref 70–110)
HCT VFR BLD AUTO: 39.5 % (ref 40–54)
HGB BLD-MCNC: 13.3 G/DL (ref 14–18)
IMM GRANULOCYTES # BLD AUTO: 0.02 K/UL (ref 0–0.04)
IMM GRANULOCYTES NFR BLD AUTO: 0.2 % (ref 0–0.5)
LYMPHOCYTES # BLD AUTO: 1.3 K/UL (ref 1–4.8)
LYMPHOCYTES NFR BLD: 12.5 % (ref 18–48)
MCH RBC QN AUTO: 27.9 PG (ref 27–31)
MCHC RBC AUTO-ENTMCNC: 33.7 G/DL (ref 32–36)
MCV RBC AUTO: 83 FL (ref 82–98)
MONOCYTES # BLD AUTO: 0.6 K/UL (ref 0.3–1)
MONOCYTES NFR BLD: 5.4 % (ref 4–15)
NEUTROPHILS # BLD AUTO: 8.8 K/UL (ref 1.8–7.7)
NEUTROPHILS NFR BLD: 81.8 % (ref 38–73)
NRBC BLD-RTO: 0 /100 WBC
PLATELET # BLD AUTO: 252 K/UL (ref 150–450)
PMV BLD AUTO: 10.2 FL (ref 9.2–12.9)
POTASSIUM SERPL-SCNC: 4.2 MMOL/L (ref 3.5–5.1)
RBC # BLD AUTO: 4.76 M/UL (ref 4.6–6.2)
SODIUM SERPL-SCNC: 136 MMOL/L (ref 136–145)
WBC # BLD AUTO: 10.76 K/UL (ref 3.9–12.7)

## 2023-05-11 PROCEDURE — 63600175 PHARM REV CODE 636 W HCPCS: Performed by: PHYSICIAN ASSISTANT

## 2023-05-11 PROCEDURE — 80048 BASIC METABOLIC PNL TOTAL CA: CPT | Performed by: STUDENT IN AN ORGANIZED HEALTH CARE EDUCATION/TRAINING PROGRAM

## 2023-05-11 PROCEDURE — 99024 POSTOP FOLLOW-UP VISIT: CPT | Mod: ,,, | Performed by: PHYSICIAN ASSISTANT

## 2023-05-11 PROCEDURE — 63600175 PHARM REV CODE 636 W HCPCS: Performed by: STUDENT IN AN ORGANIZED HEALTH CARE EDUCATION/TRAINING PROGRAM

## 2023-05-11 PROCEDURE — 99024 PR POST-OP FOLLOW-UP VISIT: ICD-10-PCS | Mod: ,,, | Performed by: PHYSICIAN ASSISTANT

## 2023-05-11 PROCEDURE — 25000003 PHARM REV CODE 250: Performed by: PHYSICIAN ASSISTANT

## 2023-05-11 PROCEDURE — 36415 COLL VENOUS BLD VENIPUNCTURE: CPT | Performed by: STUDENT IN AN ORGANIZED HEALTH CARE EDUCATION/TRAINING PROGRAM

## 2023-05-11 PROCEDURE — 99900035 HC TECH TIME PER 15 MIN (STAT)

## 2023-05-11 PROCEDURE — 85025 COMPLETE CBC W/AUTO DIFF WBC: CPT | Performed by: STUDENT IN AN ORGANIZED HEALTH CARE EDUCATION/TRAINING PROGRAM

## 2023-05-11 PROCEDURE — 94761 N-INVAS EAR/PLS OXIMETRY MLT: CPT

## 2023-05-11 PROCEDURE — 97161 PT EVAL LOW COMPLEX 20 MIN: CPT

## 2023-05-11 PROCEDURE — 97116 GAIT TRAINING THERAPY: CPT

## 2023-05-11 PROCEDURE — 25000003 PHARM REV CODE 250: Performed by: STUDENT IN AN ORGANIZED HEALTH CARE EDUCATION/TRAINING PROGRAM

## 2023-05-11 PROCEDURE — 97165 OT EVAL LOW COMPLEX 30 MIN: CPT

## 2023-05-11 RX ORDER — ONDANSETRON 8 MG/1
8 TABLET, ORALLY DISINTEGRATING ORAL EVERY 6 HOURS PRN
Qty: 28 TABLET | Refills: 0 | Status: SHIPPED | OUTPATIENT
Start: 2023-05-11 | End: 2023-11-09 | Stop reason: SDUPTHER

## 2023-05-11 RX ORDER — OXYCODONE HYDROCHLORIDE 10 MG/1
10 TABLET ORAL EVERY 4 HOURS PRN
Qty: 42 TABLET | Refills: 0 | Status: SHIPPED | OUTPATIENT
Start: 2023-05-11 | End: 2023-11-13 | Stop reason: ALTCHOICE

## 2023-05-11 RX ADMIN — OXYCODONE HYDROCHLORIDE 10 MG: 10 TABLET ORAL at 08:05

## 2023-05-11 RX ADMIN — DIAZEPAM 5 MG: 5 TABLET ORAL at 04:05

## 2023-05-11 RX ADMIN — CEFAZOLIN 2 G: 2 INJECTION, POWDER, FOR SOLUTION INTRAMUSCULAR; INTRAVENOUS at 08:05

## 2023-05-11 RX ADMIN — DIAZEPAM 5 MG: 5 TABLET ORAL at 10:05

## 2023-05-11 RX ADMIN — CEFAZOLIN 2 G: 2 INJECTION, POWDER, FOR SOLUTION INTRAMUSCULAR; INTRAVENOUS at 01:05

## 2023-05-11 RX ADMIN — MORPHINE SULFATE 2 MG: 2 INJECTION, SOLUTION INTRAMUSCULAR; INTRAVENOUS at 03:05

## 2023-05-11 RX ADMIN — ONDANSETRON 8 MG: 8 TABLET, ORALLY DISINTEGRATING ORAL at 04:05

## 2023-05-11 NOTE — PLAN OF CARE
[facility] - Neurosurgery (Hospital)  Discharge Assessment    Primary Care Provider: Primary Doctor No     Discharge Assessment (most recent)       BRIEF DISCHARGE ASSESSMENT - 05/11/23 6347          Discharge Planning    Assessment Type Discharge Planning Brief Assessment     Resource/Environmental Concerns none     Support Systems Parent     Equipment Currently Used at Home none     Current Living Arrangements home     Patient/Family Anticipates Transition to home     Patient/Family Anticipated Services at Transition none     DME Needed Upon Discharge  none     Discharge Plan A Home     Discharge Plan B Home

## 2023-05-11 NOTE — HOSPITAL COURSE
The patient presented for the above stated procedure. The patient tolerated the procedure well and there were no intra-operative complications. After surgery, the patient was extubated and taken to recovery in stable condition.  After observation in recovery, the patient was transferred to the neurosurgical floor.  Post-op X-rays showed good placement of the hardware.  Drain was removed on 5/11 without complication.  Pain controlled. Reports improvement in right hand/arm numbness.     General: well developed, well nourished, no distress.   Head: normocephalic, atraumatic  Neurologic: Alert and oriented. Thought content appropriate.  GCS: Motor: 6/Verbal: 5/Eyes: 4 GCS Total: 15  Mental Status: Awake, Alert, Oriented x 4  Language: No aphasia  Speech: No dysarthria  Cranial nerves: face symmetric, CN II-XII grossly intact, EOMI.   Pulmonary: normal respirations, no signs of respiratory distress  Abdomen: soft, non-distended, not tender to palpation  Sensory: intact to light touch throughout  Motor Strength: Moves all extremities spontaneously with good tone.  Full strength upper and lower extremities. No abnormal movements seen.     Strength  Deltoids Triceps Biceps Wrist Extension Wrist Flexion Hand    Upper: R 5/5 5/5 5/5 5/5 5/5 5/5    L 5/5 5/5 5/5 5/5 5/5 5/5     Iliopsoas Quadriceps Knee  Flexion Tibialis  anterior Gastro- cnemius EHL   Lower: R 5/5 5/5 5/5 5/5 5/5 5/5    L 5/5 5/5 5/5 5/5 5/5 5/5     Skin: Skin is warm, dry and intact.  Incision c/d/I with skin edges well approximated with dermabond. No surrounding erythema or edema. No drainage from incision. No palpable hematoma. HV drain intact to suction prior to removal.     On 5/11 he was discharged home with pain medication and follow up appointments. The patient was tolerating a regular diet and voiding without difficulty. Activity as tolerated. At the time of discharge, the patient was neurologically stable, was afebrile, and vital signs were  stable. Discharge instructions were given verbally/written to the patient, including wound care and follow-up appointments, and all of their questions were answered. The patient was also given a discharge instruction sheet explaining the aforementioned discussion.  The patient verbalized understanding of instructions and agreed to the plan. They were encouraged to call the clinic with any questions they might have prior to the follow up appointments.

## 2023-05-11 NOTE — DISCHARGE INSTRUCTIONS
Post op Spine Patient Instructions    Activity Restrictions:  [x]  Return to work will be determined on an individual basis.  [x]  No lifting greater than 5-10 pounds.  [x]  Avoid bending and twisting the area of your surgery more than 45 degrees from neutral position in any direction.  [x]  No driving or operating machinery:  [x]  until cleared by your surgeon.  [x]  while taking narcotic pain medications or muscle relaxants.  [x]  Wear your brace as needed.   [x]  Increase ambulation over the next 2 weeks. Walk on paved surfaces only. It is okay to walk up and down stairs while holding onto a side rail.    Discharge Medication/Follow-up:  [x]  Please refer to discharge medication reconciliation form.  [x]  Take Tylenol (acetaminophen) 650 mg every 6 hours as needed for additional pain control.  [x]  Do not take ANY Aspirin or Aspirin containing products for 2 weeks after surgery (unless otherwise directed in discharge medication list).   [x]  Do not take ANY herbal supplements for 2 weeks after surgery.    [x]  Do not take ANY non-steroidal anti-inflammatory drugs (NSAIDS), including the following: ibuprofen, naprosyn, Aleve, Advil, Indocin, Mobic, or Celebrex for 12 weeks after surgery.   [x]  Prescriptions for appropriate medication will be given upon discharge.  [x]  Take the pain medication as prescribed. We recommend to wean use of your pain medication after 1 week of taking as prescribed (Ex: After 1 week of taking every 4 hours as needed, wean down to taking your pain medication every 6 hours as needed).  [x]  Take docusate (Colace 100 mg): take one capsule a day as needed for constipation. You can get this over the counter.  [x]  Follow-up appointment:  [x]  10-14 days post-op for wound check by physician assistant/nurse  [x]  4-6 weeks with MD:  [x]  with x-rays  [x]  An appointment will be mailed to you.    Wound Care:  [x]  Remove the small dressing near your incision in 2 days.   [x]  No bandage  required. Keep your incision open to the air. You have dermabond (skin glue) covering your incision. This will begin to flake off over the next 2 weeks. Do not remove this on your own, allow it to peel off. Do not apply ointments or creams to your incision.  [x]  You may shower on the 2nd day after your surgery. Keep the incision clean and dry at all times. Do not allow the force of water to hit the incision. If the incision gets damp, pat it dry. Do not rub or scrub the incision.  [x]  You cannot take a bath until 8 weeks after surgery.      Call your doctor or go to the Emergency Room for any signs of infection, including: increased redness, drainage, pain, or fever (temperature ?101). Call your doctor or go to the Emergency Room if there are any localized neurological changes; problems with speech, vision, numbness, tingling, weakness, or severe headache; inability to control urination or bowel movements; inability to urinate; or for other concerns.      Special Instructions:  [x]  No use of tobacco products.  [x]  Diet: Please eat a regular diet as tolerated.      Physicians need 3 days' notice for pain medicine to be refilled. Pain medicine will only be refilled between 8 AM and 5 PM, Monday through Friday, due to Food and Drug Administration regulation of documentation.    If you have any questions about this form, please call [phone].    Form No. 22985 (Revised 10/31/2013)

## 2023-05-11 NOTE — PLAN OF CARE
Problem: Adult Inpatient Plan of Care  Goal: Plan of Care Review  5/11/2023 0723 by Mimi Prather RN  Outcome: Ongoing, Progressing    Problem: Adult Inpatient Plan of Care  Goal: Optimal Comfort and Wellbeing  5/11/2023 0723 by Mimi Prather RN  Outcome: Ongoing, Progressing     Problem: Adult Inpatient Plan of Care  Goal: Absence of Hospital-Acquired Illness or Injury  5/11/2023 0723 by Mimi Prather RN  Outcome: Ongoing, Progressing       Problem: Pain Acute  Goal: Acceptable Pain Control and Functional Ability  Outcome: Ongoing, Progressing

## 2023-05-11 NOTE — HPI
Josef Sage is a 47 y.o.  male with a PMHx of HLD, who is referred to me by Dr. Sage for evaluation of neck pain. Patient reports his pain is associated with numbness and tingling that radiates down his right elbow into his fingers. He describes his pain as a constant dull pain that builds up throughout the day.  He states he has weakness that has prevented him from working up. He reports good balance, no b/b dysfunction, and denies dropping anything from his hands. His symptoms began worsening since December, and he denies receiving injections since his symptoms started to worsen.

## 2023-05-11 NOTE — NURSING
@Nurses Note -- 4 Eyes      5/10/2023   7:05 PM      Skin assessed during: Transfer      [x] No Altered Skin Integrity Present    []Prevention Measures Documented      [] Yes- Altered Skin Integrity Present or Discovered   [] LDA Added if Not in Epic (Describe Wound)   [] New Altered Skin Integrity was Present on Admit and Documented in LDA   [] Wound Image Taken    Wound Care Consulted? No    Attending Nurse:  Lilia Frye RN     Second RN/Staff Member:  Obed

## 2023-05-11 NOTE — PT/OT/SLP EVAL
Physical Therapy Evaluation and Discharge Note    Patient Name:  Josef Sage   MRN:  [id number]    Recommendations:     Discharge Recommendations: other (see comments)  Discharge Equipment Recommendations: none   Barriers to discharge: None    Assessment:     Josef Sage is a 47 y.o. male admitted with a medical diagnosis of Cervical spondylosis with radiculopathy. .  At this time, patient is functioning at their prior level of function and does not require further acute PT services.     Recent Surgery: Procedure(s) (LRB):  C5-6 ACDF (N/A) 1 Day Post-Op    Plan:     During this hospitalization, patient does not require further acute PT services.  Please re-consult if situation changes.      Subjective     Chief Complaint: neck pain  Patient/Family Comments/goals: get back home today  Pain/Comfort:  Pain Rating 1: 7/10  Location - Orientation 1: generalized  Location 1: neck  Pain Addressed 1: Pre-medicate for activity, Reposition  Pain Rating Post-Intervention 1: 7/10    Patients cultural, spiritual, Faith conflicts given the current situation: no    Living Environment:  Pt will be going to stay w/ his parents in a 2SH w/ no EMY and will remain on first floor.  Prior to admission, patients level of function was independent.  Equipment used at home: none.  DME owned (not currently used): none.  Upon discharge, patient will have assistance from family.    Objective:     Communicated with RN prior to session.  Patient found HOB elevated with peripheral IV, cervical collar upon PT entry to room.    General Precautions: Standard, fall    Orthopedic Precautions:spinal precautions   Braces: Aspen collar  Respiratory Status: Room air    Exams:  Cognitive Exam:  Patient is oriented to Person, Place, Time, and Situation  Gross Motor Coordination:  WFL  Sensation:    -       Intact  RLE ROM: WFL  RLE Strength: WFL  LLE ROM: WFL  LLE Strength: WFL    Functional Mobility:  Bed Mobility:     Supine to Sit: independence  Sit to  Supine: independence  Transfers:     Sit to Stand:  independence with no AD  Gait: 200' independently   Tinetti Total Score: 28  < 18 = High Risk of Falls, 19-23 = Moderate Risk of Falls, > 24 = Low Risk of Falls    AM-PAC 6 CLICK MOBILITY  Total Score:24       Treatment and Education:  PT educated patient on spinal precautions: no bending, lifting >10lbs (~1 gallon of milk), or twisting, w/ acronym no BLT to assist w/ remembering precautions. Patient verbalized understanding of spinal precautions and that these precautions will remain in place until MD clears pt to return to these activities.   Pt educated on PT assessment and plan to d/c orders 2/2 no current needs for skilled PT. Pt instructed to slowly progress mobility each day towards PLOF without attempting to jump right into previous daily routine immediately upon d/c 2/2 general deconditioning from being in hospital. Pt in agreement w/ POC and verbalized understanding w/ plan to slowly progress to normal daily activities at home and inform MD team if pt experiences any difficulty progressing back to PLOF while here or upon d/c in order for PT to be re-consulted/orders placed for OP PT as needed.     AM-PAC 6 CLICK MOBILITY  Total Score:24     Patient left HOB elevated with all lines intact, call button in reach, and RN notified.    GOALS:   Multidisciplinary Problems       Physical Therapy Goals       Not on file                    History:     Past Medical History:   Diagnosis Date    Anxiety     Asthma     Bronchitis     Cervical spondylosis with radiculopathy 5/10/2023    GERD (gastroesophageal reflux disease)     Mixed hyperlipidemia 09/17/2019    Recurrent upper respiratory infection (URI)     Wheezes        Past Surgical History:   Procedure Laterality Date    ANTERIOR CERVICAL DISCECTOMY W/ FUSION N/A 5/10/2023    Procedure: C5-6 ACDF;  Surgeon: Ivan Dalton MD;  Location: [facility] OR [address] [place] [address];  Service: Neurosurgery;  Laterality: N/A;  C5-6  ACDF  anesthesia: general  nerve mon: EMG,SEP, MEP  radiology; C-ARM  bed: regular slider  headrest: MINDI eddy/hanging weights/ surgical pillow  misc; interbody paddle distractor, nerve root retractor (depuy)    MOUTH SURGERY      None         Time Tracking:     PT Received On: 05/11/23  PT Start Time: 0839     PT Stop Time: 0856  PT Total Time (min): 17 min     Billable Minutes: Evaluation 7 and Gait Training 10      05/11/2023

## 2023-05-11 NOTE — PT/OT/SLP EVAL
Occupational Therapy   Evaluation and Discharge Note    Name: Josef Sage  MRN: [id number]  Admitting Diagnosis: Cervical spondylosis with radiculopathy  Recent Surgery: Procedure(s) (LRB):  C5-6 ACDF (N/A) 1 Day Post-Op    Recommendations:     Discharge Recommendations: home  Discharge Equipment Recommendations: none  Barriers to discharge:  None    Assessment:     Josef Sage is a 47 y.o. male with a medical diagnosis of Cervical spondylosis with radiculopathy. At this time, patient is functioning at their prior level of function and does not require further acute OT services.     Plan:     During this hospitalization, patient does not require further acute OT services.  Please re-consult if situation changes.    Plan of Care Reviewed with: patient    Subjective     Chief Complaint: pain  Patient/Family Comments/goals: go home    Occupational Profile:  Living Environment: pt will d/c to parents' house, no EMY and WIS  Previous level of function: Independent, drives, works from home  Roles and Routines: Work; watch TV  Equipment Used at home: none  Assistance upon Discharge: Available    Pain/Comfort:  Pain Rating 1: 2/10  Location - Orientation 1: generalized  Location 1: neck  Pain Addressed 1: Pre-medicate for activity, Reposition  Pain Rating Post-Intervention 1: 2/10    Patients cultural, spiritual, Spiritism conflicts given the current situation: no    Objective:     Communicated with: RN prior to session.  Patient found supine with cervical collar upon OT entry to room.    General Precautions: Standard,  (none)  Orthopedic Precautions: spinal precautions  Braces: Aspen collar  Respiratory Status: Room air     Occupational Performance:    Bed Mobility:    Patient completed Supine to Sit with independence  Patient completed Sit to Supine with independence    Functional Mobility/Transfers:  Patient completed Sit <> Stand Transfer with independence  with  no assistive device   Patient completed Toilet Transfer  Step Transfer technique with independence with  no AD  Functional Mobility: Independent    Activities of Daily Living:  Grooming: independence    Upper Body Dressing: independence including [place] collar  Lower Body Dressing: independence socks  Toileting: independence per pt; had already voided at toilet prior to OT arrival    Cognitive/Visual Perceptual:  Cognitive/Psychosocial Skills:     -       Oriented to: Person, Place, Time, and Situation   -       Follows Commands/attention:Follows multistep  commands  -       Safety awareness/insight to disability: intact     Physical Exam:  Upper Extremity Range of Motion:     -       Right Upper Extremity: WFL  -       Left Upper Extremity: WFL  Upper Extremity Strength:    -       Right Upper Extremity: WFL  -       Left Upper Extremity: WFL   Strength:    -       Right Upper Extremity: WFL  -       Left Upper Extremity: WFL  Fine Motor Coordination:    -       Intact  Gross motor coordination:   WFL    AMPAC 6 Click ADL:  AMPAC Total Score: 24    Treatment & Education:  Pt edu re OT role, POC and safety.  Pt edu re cervical precautions and use of collar.    Patient left sitting edge of bed with call button in reach and RN notified    GOALS:   Multidisciplinary Problems       Occupational Therapy Goals       Not on file                    History:     Past Medical History:   Diagnosis Date    Anxiety     Asthma     Bronchitis     Cervical spondylosis with radiculopathy 5/10/2023    GERD (gastroesophageal reflux disease)     Mixed hyperlipidemia 09/17/2019    Recurrent upper respiratory infection (URI)     Wheezes          Past Surgical History:   Procedure Laterality Date    ANTERIOR CERVICAL DISCECTOMY W/ FUSION N/A 5/10/2023    Procedure: C5-6 ACDF;  Surgeon: Ivan Dalton MD;  Location: [facility] OR [address] [place] [address];  Service: Neurosurgery;  Laterality: N/A;  C5-6 ACDF  anesthesia: general  nerve mon: EMG,SEP, MEP  radiology; C-ARM  bed: regular slider  headrest: MINDI eddy  tongs/hanging weights/ surgical pillow  misc; interbody paddle distractor, nerve root retractor (depuy)    MOUTH SURGERY      None         Time Tracking:     OT Date of Treatment: 05/11/23  OT Start Time: 0952  OT Stop Time: 1000  OT Total Time (min): 8 min    Billable Minutes:Evaluation 8 minutes    EVGENY Nieto  5/11/2023  Pager: [phone]

## 2023-05-11 NOTE — PLAN OF CARE
[facility] - Neurosurgery (Hospital)  Discharge Final Note    Primary Care Provider: Primary Doctor No    Expected Discharge Date: 5/11/2023    Patient discharged home.  The patient does not have any home needs.  Family to provide transportation home.  Neurosurgery clinic to schedule follow up appointment.    Future Appointments   Date Time Provider Department Center   5/24/2023 10:00 AM Leelee Zhu RN [facility] NEUROS8 [facility]   6/22/2023  9:00 AM [facility] OIC-XRAY [facility]   6/22/2023  9:30 AM Cherry Ny NP [facility] NEUROS8 [facility]        Final Discharge Note (most recent)       Final Note - 05/11/23 1406          Final Note    Assessment Type Final Discharge Note     Anticipated Discharge Disposition Home or Self Care        Post-Acute Status    Post-Acute Authorization Other     Other Status No Post-Acute Service Needs     Discharge Delays None known at this time                     Important Message from Medicare

## 2023-05-11 NOTE — DISCHARGE SUMMARY
[facility])  Neurosurgery  Discharge Summary      Patient Name: Josef Sage  MRN: [id number]  Admission Date: 5/10/2023  Hospital Length of Stay: 0 days  Discharge Date and Time:  05/11/2023 9:53 AM  Attending Physician: Ivan Dalton MD   Discharging Provider: Debby Mike PA-C  Primary Care Provider: Primary Doctor No    HPI:   Josef Sage is a 47 y.o.  male with a PMHx of HLD, who is referred to me by Dr. Sage for evaluation of neck pain. Patient reports his pain is associated with numbness and tingling that radiates down his right elbow into his fingers. He describes his pain as a constant dull pain that builds up throughout the day.  He states he has weakness that has prevented him from working up. He reports good balance, no b/b dysfunction, and denies dropping anything from his hands. His symptoms began worsening since December, and he denies receiving injections since his symptoms started to worsen.        Procedure(s) (LRB):  C5-6 ACDF (N/A)     Hospital Course:   The patient presented for the above stated procedure. The patient tolerated the procedure well and there were no intra-operative complications. After surgery, the patient was extubated and taken to recovery in stable condition.  After observation in recovery, the patient was transferred to the neurosurgical floor.  Post-op X-rays showed good placement of the hardware.  Drain was removed on 5/11 without complication.  Pain controlled. Reports improvement in right hand/arm numbness.     General: well developed, well nourished, no distress.   Head: normocephalic, atraumatic  Neurologic: Alert and oriented. Thought content appropriate.  GCS: Motor: 6/Verbal: 5/Eyes: 4 GCS Total: 15  Mental Status: Awake, Alert, Oriented x 4  Language: No aphasia  Speech: No dysarthria  Cranial nerves: face symmetric, CN II-XII grossly intact, EOMI.   Pulmonary: normal respirations, no signs of respiratory distress  Abdomen: soft, non-distended,  not tender to palpation  Sensory: intact to light touch throughout  Motor Strength: Moves all extremities spontaneously with good tone.  Full strength upper and lower extremities. No abnormal movements seen.     Strength  Deltoids Triceps Biceps Wrist Extension Wrist Flexion Hand    Upper: R 5/5 5/5 5/5 5/5 5/5 5/5    L 5/5 5/5 5/5 5/5 5/5 5/5     Iliopsoas Quadriceps Knee  Flexion Tibialis  anterior Gastro- cnemius EHL   Lower: R 5/5 5/5 5/5 5/5 5/5 5/5    L 5/5 5/5 5/5 5/5 5/5 5/5     Skin: Skin is warm, dry and intact.  Incision c/d/I with skin edges well approximated with dermabond. No surrounding erythema or edema. No drainage from incision. No palpable hematoma. HV drain intact to suction prior to removal.     On 5/11 he was discharged home with pain medication and follow up appointments. The patient was tolerating a regular diet and voiding without difficulty. Activity as tolerated. At the time of discharge, the patient was neurologically stable, was afebrile, and vital signs were stable. Discharge instructions were given verbally/written to the patient, including wound care and follow-up appointments, and all of their questions were answered. The patient was also given a discharge instruction sheet explaining the aforementioned discussion.  The patient verbalized understanding of instructions and agreed to the plan. They were encouraged to call the clinic with any questions they might have prior to the follow up appointments.         Goals of Care Treatment Preferences:     Full code        Consults: PT/OT    Significant Diagnostic Studies: Labs:   BMP:   Recent Labs   Lab 05/11/23 0419   *      K 4.2      CO2 23   BUN 12   CREATININE 0.8   CALCIUM 8.5*   , CBC   Recent Labs   Lab 05/11/23 0419   WBC 10.76   HGB 13.3*   HCT 39.5*       and All labs within the past 24 hours have been reviewed  Radiology: XR cervical spine     Pending Diagnostic Studies:     None        Final  Active Diagnoses:    Diagnosis Date Noted POA    PRINCIPAL PROBLEM:  Cervical spondylosis with radiculopathy [M47.22] 05/10/2023 Unknown      Problems Resolved During this Admission:      Discharged Condition: good     Disposition: Home or Self Care    Follow Up: 2 weeks in NSGY clinic for wound check     Patient Instructions:      Notify your health care provider if you experience any of the following:  temperature >100.4     Notify your health care provider if you experience any of the following:  persistent nausea and vomiting or diarrhea     Notify your health care provider if you experience any of the following:  severe uncontrolled pain     Notify your health care provider if you experience any of the following:  redness, tenderness, or signs of infection (pain, swelling, redness, odor or green/yellow discharge around incision site)     Notify your health care provider if you experience any of the following:  difficulty breathing or increased cough     Notify your health care provider if you experience any of the following:  severe persistent headache     Notify your health care provider if you experience any of the following:  worsening rash     Notify your health care provider if you experience any of the following:  persistent dizziness, light-headedness, or visual disturbances     Notify your health care provider if you experience any of the following:  increased confusion or weakness     Activity as tolerated     Medications:  Reconciled Home Medications:      Medication List      START taking these medications    ondansetron 8 MG Tbdl  Commonly known as: ZOFRAN-ODT  Take 1 tablet (8 mg total) by mouth every 6 (six) hours as needed (nausea).     oxyCODONE 10 mg Tab immediate release tablet  Commonly known as: ROXICODONE  Take 1 tablet (10 mg total) by mouth every 4 (four) hours as needed (pain 6-7/10).        CONTINUE taking these medications    albuterol 90 mcg/actuation inhaler  Commonly known as:  PROVENTIL/VENTOLIN HFA  Inhale 1-2 puffs into the lungs every 6 (six) hours as needed for Wheezing.     ARNICA FLOWER (BULK) MISC  Take by mouth Daily. One capsule     EScitalopram oxalate 10 MG tablet  Commonly known as: LEXAPRO  Take 10 mg by mouth every evening.     fluticasone propionate 50 mcg/actuation nasal spray  Commonly known as: FLONASE  2 sprays (100 mcg total) by Each Nostril route 2 (two) times daily.     multivitamin capsule  Take 1 capsule by mouth once daily.     PROPECIA 1 mg tablet  Generic drug: finasteride  Take 1 tablet (1 mg total) by mouth once daily.     VITAMIN D-3 ORAL        STOP taking these medications    ibuprofen 200 MG tablet  Commonly known as: ANDRAE WAGNER PA-C  Neurosurgery  [facility] - Neurosurgery [facility])

## 2023-05-12 NOTE — OP NOTE
DATE OF SURGERY: 5/10/23    PREOPERATIVE DIAGNOSIS:  1. Right C6 radiculopathy and C5-6 stenosis    POSTOPERATIVE DIAGNOSIS:  1. Same    PROCEDURE PERFORMED:  1. Anterior cervical discectomy and fusion, C5-6  2. Application of PEEK interbody cage with integrated fixation, C5-6 (Globus)  3. Use of intraoperative microscope for microdissection  4. Use of neuromonitoring with MEPs  5. Use of intraoperative fluoroscopy  6. DBM bone grafting    SURGEON: Ivan Dalton M.D.    ASSISTANT: Christian Chacko M.D.    ANESTHESIA: GETA    ESTIMATED BLOOD LOSS: Minimal    COMPLICATIONS: None    DRAINS: Deep Hemovac    SPECIMENS SENT: None    INDICATIONS:    This is a 47M who presented with signs and symptoms of progressive degenerative cervical radiculopathy. Imaging showed severe stenosis at c5-6 with right C6 nerve root compression. Conservative measures were not successful. As such, I recommended a C5-6 anterior cervical discectomy and fusion.    Risks, benefits, alternatives, indications and methods were reviewed in detail and the patient wished to proceed. All questions were answered. No guarantees about the results of the procedure were made.    PROCEDURE:    The patient was brought into the operating room where he was intubated and placed under general anesthesia without difficulty. All lines were placed. The patient was positioned supine onto the operating table with appropriate padding of all pressure points. The head and neck were placed in slight extension, resting on a surgical pillow. Lateral x-rays were taken for localization and to assess cervical lordosis. Baseline MEPs were run and found to be present and stable in all extremities. The right neck was marked, prepped and draped in the usual sterile fashion. A timeout was performed prior to the procedure. Ten mL of Lidocaine with epinephrine were injected into the skin.    A transverse linear skin incision was made at the right neck and Weitlaner retractors were  used to widen the incision. The platysma was divided sharply with Metzenbaum scissors. A sub-plastysmal dissection was carried out with Bovie electrocautery. A Dickens Busch approach to the the anterior cervical spine was performed in the usual fashion. The carotid artery was palpated lateral to the working corridor. The prevertebral soft tissues were bluntly dissected with Kitner dissectors. A spinal needle was placed into the presumed C5-6 disc space, and was confirmed on lateral x-ray. Subperiosteal dissection was carried out at C5 and C6 vertebral bodies with Bovie electrocautery. The Trimline retractor system was put into place.    An annulotomy at C5-6 was performed with the 15 blade. Pituitary rongeurs and curettes were used to remove disc material. Anterior osteophytes were removed with the rongeur and Kerrison punches. The end plates were prepared with straight curettes. The microscope was then brought into the field for microdissection. Posterior osteophytes were removed with the high speed drill and Kerrison punches. The posterior longitudinal ligament was opened and resected with curettes and kerrison punches. Adequate central and neuroforaminal decompression was achieved at C5-6 and confirmed with a nerve hook.     An intervertebral trial spacer was placed, and a size 6 PEEK cage was packed with DBM for anterior arthrodesis at C5-6 . The cage was then countersunk into the C5-6 disc space. Integrated screw fixation was deployed under flouroscopy. MEPs were run after this was completed and found to be consistent with baseline. The microscope was taken out of the field, and the [place] pins were removed. All hardware was found to be in adequate position on AP and lateral xrays. The locking screw was tightened.    The wound was copiously irrigated with normal saline and hemostasis was achieved with bipolar electrocautery. Depomedrol was placed over the esophagus and the retropharyngeal space. One gram of  Vancomycin powder was placed into the wound. One deep Hemovac drain was placed. The platysma was reapproximated with interrupted inverted 3.0 Vicryl sutures and a subcuticular stitch was placed with 4.0 Monocryl. Dermabond was placed at the skin.    The patient tolerated the procedure well from a hemodynamic and neuromonitoring standpoint. MEPs were present and stable throughout the case. I was present for all critical portions of the case, and at the end of the case all counts were correct. The patient was repositioned supine onto the hospital bed where he was extubated and allowed to emerge from anesthesia without difficult. The patient was sent to the PACU in stable condition for recovery.

## 2023-05-24 ENCOUNTER — CLINICAL SUPPORT (OUTPATIENT)
Dept: NEUROSURGERY | Facility: CLINIC | Age: 48
End: 2023-05-24
Payer: COMMERCIAL

## 2023-05-24 ENCOUNTER — PATIENT MESSAGE (OUTPATIENT)
Dept: NEUROSURGERY | Facility: CLINIC | Age: 48
End: 2023-05-24

## 2023-05-24 ENCOUNTER — TELEPHONE (OUTPATIENT)
Dept: NEUROSURGERY | Facility: CLINIC | Age: 48
End: 2023-05-24

## 2023-05-24 VITALS — HEART RATE: 90 BPM | DIASTOLIC BLOOD PRESSURE: 76 MMHG | SYSTOLIC BLOOD PRESSURE: 119 MMHG | TEMPERATURE: 98 F

## 2023-05-24 PROCEDURE — 99999 PR PBB SHADOW E&M-EST. PATIENT-LVL III: ICD-10-PCS | Mod: PBBFAC,,,

## 2023-05-24 PROCEDURE — 99999 PR PBB SHADOW E&M-EST. PATIENT-LVL III: CPT | Mod: PBBFAC,,,

## 2023-05-24 NOTE — TELEPHONE ENCOUNTER
P/c to pt. After discussing the issue of pt having dermabond on neck and putting vasoline on edges at 3 weeks to help remove., Dr. Dalton felt it was better to not use anything on it and to let the dermabond just eventually wear away.  Pt made aware of above and verbalized understanding.

## 2023-05-24 NOTE — PROGRESS NOTES
Josef Sage is a 47 y.o. male here for 2 week post op wound check.     Surgery:C5-6 ACDF     Symptoms: decreased sensation/thumb & index- slowly resolving. Neck/ right arm/ shoulder pain slowly resolving    DME:none    Brace: cervical    Pain: 6    Medication Refills: none       Incision with dermabond, DAVID, well approximated, no redness, or drainage, slight swelling which he states is resolving.. . Instructed patient to keep incision DAVID, no lotions, creams or bandages. OK to shower without water pressure to area. PostOP written instructions given to patient.     Patient verbalizes understanding. Patient to followup with Dr. Dalton for 6 week followup with imaging.        Future Appointments   Date Time Provider Department Center   6/22/2023  9:00 AM [facility] OIC-XRAY [facility]   6/22/2023  9:30 AM Cherry Ny NP [facility] NEUROS8 [facility]

## 2023-06-07 ENCOUNTER — PATIENT MESSAGE (OUTPATIENT)
Dept: ADMINISTRATIVE | Facility: OTHER | Age: 48
End: 2023-06-07
Payer: COMMERCIAL

## 2023-06-26 ENCOUNTER — OFFICE VISIT (OUTPATIENT)
Dept: NEUROSURGERY | Facility: CLINIC | Age: 48
End: 2023-06-26
Payer: COMMERCIAL

## 2023-06-26 ENCOUNTER — HOSPITAL ENCOUNTER (OUTPATIENT)
Dept: RADIOLOGY | Facility: HOSPITAL | Age: 48
Discharge: HOME | End: 2023-06-26
Attending: NEUROLOGICAL SURGERY
Payer: COMMERCIAL

## 2023-06-26 VITALS
BODY MASS INDEX: 28.7 KG/M2 | DIASTOLIC BLOOD PRESSURE: 85 MMHG | SYSTOLIC BLOOD PRESSURE: 130 MMHG | HEART RATE: 102 BPM | HEIGHT: 70 IN

## 2023-06-26 DIAGNOSIS — Z98.1 S/P CERVICAL SPINAL FUSION: ICD-10-CM

## 2023-06-26 DIAGNOSIS — M48.02 SPINAL STENOSIS, CERVICAL REGION: Primary | ICD-10-CM

## 2023-06-26 PROCEDURE — 3008F BODY MASS INDEX DOCD: CPT | Mod: CPTII,S$GLB,, | Performed by: NURSE PRACTITIONER

## 2023-06-26 PROCEDURE — 1159F PR MEDICATION LIST DOCUMENTED IN MEDICAL RECORD: ICD-10-PCS | Mod: CPTII,S$GLB,, | Performed by: NURSE PRACTITIONER

## 2023-06-26 PROCEDURE — 3079F DIAST BP 80-89 MM HG: CPT | Mod: CPTII,S$GLB,, | Performed by: NURSE PRACTITIONER

## 2023-06-26 PROCEDURE — 3008F PR BODY MASS INDEX (BMI) DOCUMENTED: ICD-10-PCS | Mod: CPTII,S$GLB,, | Performed by: NURSE PRACTITIONER

## 2023-06-26 PROCEDURE — 72040 X-RAY EXAM NECK SPINE 2-3 VW: CPT | Mod: TC

## 2023-06-26 PROCEDURE — 99024 PR POST-OP FOLLOW-UP VISIT: ICD-10-PCS | Mod: S$GLB,,, | Performed by: NURSE PRACTITIONER

## 2023-06-26 PROCEDURE — 3075F PR MOST RECENT SYSTOLIC BLOOD PRESS GE 130-139MM HG: ICD-10-PCS | Mod: CPTII,S$GLB,, | Performed by: NURSE PRACTITIONER

## 2023-06-26 PROCEDURE — 1160F RVW MEDS BY RX/DR IN RCRD: CPT | Mod: CPTII,S$GLB,, | Performed by: NURSE PRACTITIONER

## 2023-06-26 PROCEDURE — 99999 PR PBB SHADOW E&M-EST. PATIENT-LVL III: CPT | Mod: PBBFAC,,, | Performed by: NURSE PRACTITIONER

## 2023-06-26 PROCEDURE — 99024 POSTOP FOLLOW-UP VISIT: CPT | Mod: S$GLB,,, | Performed by: NURSE PRACTITIONER

## 2023-06-26 PROCEDURE — 72040 XR CERVICAL SPINE AP LATERAL: ICD-10-PCS | Mod: 26,,, | Performed by: RADIOLOGY

## 2023-06-26 PROCEDURE — 1159F MED LIST DOCD IN RCRD: CPT | Mod: CPTII,S$GLB,, | Performed by: NURSE PRACTITIONER

## 2023-06-26 PROCEDURE — 99999 PR PBB SHADOW E&M-EST. PATIENT-LVL III: ICD-10-PCS | Mod: PBBFAC,,, | Performed by: NURSE PRACTITIONER

## 2023-06-26 PROCEDURE — 3075F SYST BP GE 130 - 139MM HG: CPT | Mod: CPTII,S$GLB,, | Performed by: NURSE PRACTITIONER

## 2023-06-26 PROCEDURE — 1160F PR REVIEW ALL MEDS BY PRESCRIBER/CLIN PHARMACIST DOCUMENTED: ICD-10-PCS | Mod: CPTII,S$GLB,, | Performed by: NURSE PRACTITIONER

## 2023-06-26 PROCEDURE — 3079F PR MOST RECENT DIASTOLIC BLOOD PRESSURE 80-89 MM HG: ICD-10-PCS | Mod: CPTII,S$GLB,, | Performed by: NURSE PRACTITIONER

## 2023-06-26 PROCEDURE — 72040 X-RAY EXAM NECK SPINE 2-3 VW: CPT | Mod: 26,,, | Performed by: RADIOLOGY

## 2023-06-26 NOTE — PROGRESS NOTES
Neurosurgery  Established Patient    SUBJECTIVE:     History of Present Illness: Josef Sage is a 47 y.o. male s/p ACD C5-6 with Dr. Dalton on 5/10/23. The patient is being seen in clinic today for his 6 week post-op evaluation. States that he is doing well. Reports improvement in his right-sided radicular complaints since surgery. He continues to struggle with right thumb paraesthesias and soreness in his muscles. Denies new neurological complaints. Denies difficulties with swallowing, hoarseness, fever, or chills.     Review of patient's allergies indicates:   Allergen Reactions    Sulfa (sulfonamide antibiotics) Rash       Current Outpatient Medications   Medication Sig Dispense Refill    albuterol (PROVENTIL/VENTOLIN HFA) 90 mcg/actuation inhaler Inhale 1-2 puffs into the lungs every 6 (six) hours as needed for Wheezing. 6.7 g 1    ARNICA FLOWER, BULK, MISC Take by mouth Daily. One capsule      calcium carbonate/vitamin D3 (VITAMIN D-3 ORAL)       EScitalopram oxalate (LEXAPRO) 10 MG tablet Take 10 mg by mouth every evening.      fluticasone propionate (FLONASE) 50 mcg/actuation nasal spray 2 sprays (100 mcg total) by Each Nostril route 2 (two) times daily. 16 g 11    multivitamin capsule Take 1 capsule by mouth once daily.      PROPECIA 1 mg tablet Take 1 tablet (1 mg total) by mouth once daily. 30 tablet 2    ondansetron (ZOFRAN-ODT) 8 MG TbDL Dissolve 1 tablet (8 mg total) by mouth every 6 (six) hours as needed (nausea). 28 tablet 0    oxyCODONE (ROXICODONE) 10 mg Tab immediate release tablet Take 1 tablet (10 mg total) by mouth every 4 (four) hours as needed (pain 6-7/10). 42 tablet 0     No current facility-administered medications for this visit.       Past Medical History:   Diagnosis Date    Anxiety     Asthma     Bronchitis     Cervical spondylosis with radiculopathy 5/10/2023    GERD (gastroesophageal reflux disease)     Mixed hyperlipidemia 09/17/2019    Recurrent upper respiratory infection (URI)   "   Wheezes      Past Surgical History:   Procedure Laterality Date    ANTERIOR CERVICAL DISCECTOMY W/ FUSION N/A 5/10/2023    Procedure: C5-6 ACDF;  Surgeon: Ivan Dalton MD;  Location: [facility] OR [address] [place] [address];  Service: Neurosurgery;  Laterality: N/A;  C5-6 ACDF  anesthesia: general  nerve mon: EMG,SEP, MEP  radiology; C-ARM  bed: regular slider  headrest: caspar, GW tongs/hanging weights/ surgical pillow  misc; interbody paddle distractor, nerve root retractor (depuy)    MOUTH SURGERY      None       Family History       Problem Relation (Age of Onset)    Eczema Sister    Hypertension Mother, Father          Social History     Socioeconomic History    Marital status: Single    Number of children: 0   Occupational History    Occupation: Internal medicine physician      Comment: Academics   Tobacco Use    Smoking status: Never    Smokeless tobacco: Never   Substance and Sexual Activity    Alcohol use: Never     Alcohol/week: 1.0 standard drink     Types: 1 Glasses of wine per week    Drug use: No     Comment: CBD gummies    Sexual activity: Yes     Partners: Female     Comment: Not    Social History Narrative    Stairs       Review of Systems   Constitutional:  Negative for activity change, appetite change and fever.   HENT:  Negative for hearing loss and postnasal drip.    Eyes:  Negative for pain and visual disturbance.   Respiratory:  Negative for shortness of breath.    Cardiovascular:  Negative for chest pain and palpitations.   Gastrointestinal:  Negative for abdominal pain.   Endocrine: Negative for polydipsia, polyphagia and polyuria.   Musculoskeletal:  Positive for myalgias and neck stiffness. Negative for back pain and gait problem.   Neurological:  Positive for numbness. Negative for dizziness, weakness and headaches.   Psychiatric/Behavioral:  Negative for confusion and dysphoric mood.      OBJECTIVE:     Vital Signs  Pulse: 102  BP: 130/85  Pain Score:   4  Height: 5' 10" (177.8 cm)  Body mass index " is 28.7 kg/m².    Neurosurgery Physical Exam  General: well developed, well nourished, no distress.   Head: normocephalic, atraumatic  Neurologic: Alert and oriented. Thought content appropriate.  GCS: Motor: 6/Verbal: 5/Eyes: 4 GCS Total: 15  Mental Status: Awake, Alert, Oriented x 4  Language: No aphasia  Speech: No dysarthria  Cranial nerves: face symmetric, tongue midline, CN II-XII grossly intact.   Eyes: pupils equal, round, reactive to light with accomodation, EOMI.   Pulmonary: normal respirations, no signs of respiratory distress  Abdomen: soft, non-distended  Skin: Skin is warm, dry and intact. Incision well-healed.   Sensory: intact to light touch throughout  Motor Strength:Moves all extremities spontaneously with good tone.  Full strength upper and lower extremities.     Diagnostic Results:  I have personally reviewed the x-ray cervical spine dated 6/26/23, which shows  C5-C6 ACDF with hardware in stable position.     ASSESSMENT/PLAN:   Josef Sage is a 47 y.o. male s/p ACD C5-6 with Dr. Dalton on 5/10/23. The patient was seen in clinic today for his 6 week post-op evaluation. States that he is doing well since surgery with some residual right thumb paraesthesias and muscle soreness. I have ordered PT to assist with continued soreness and return to normal activities. A CT cervical spine has been ordered to obtain in 6 weeks for the 3 month post-op to assess for bony fusion.     I would like the patient to follow-up in clinic with Dr. Dalton in 6 weeks. I have encouraged him to contact the clinic with any questions, concerns, or adverse clinical changes. He verbalized understanding.      Cherry Ny, GUSTAVO-DAMIEN  Neurosurgery  [facility].      Note dictated with voice recognition software, please excuse any grammatical errors.

## 2023-07-06 ENCOUNTER — TELEPHONE (OUTPATIENT)
Dept: PRIMARY CARE CLINIC | Facility: CLINIC | Age: 48
End: 2023-07-06
Payer: COMMERCIAL

## 2023-07-06 NOTE — TELEPHONE ENCOUNTER
----- Message from Sridevi Duffy sent at 7/6/2023  3:37 PM CDT -----  Contact: [phone]  Patient is returning a phone call.  Who left a message for the patient: Dr Prather's office  Does patient know what this is regarding:  no  Would you like a call back, or a response through your [facility] portal?:   phone  Comments:

## 2023-07-06 NOTE — TELEPHONE ENCOUNTER
----- Message from Jessika Lopez sent at 7/5/2023  6:26 PM CDT -----  Regarding: PT ADVICE  Contact: PT  Pt called regarding scheduling an appt. Next available 11.1.23, pt declined and would like to be seen sooner.    Please advise. Pt can be reached at [phone]

## 2023-07-21 ENCOUNTER — PATIENT MESSAGE (OUTPATIENT)
Dept: NEUROSURGERY | Facility: CLINIC | Age: 48
End: 2023-07-21
Payer: COMMERCIAL

## 2023-07-25 NOTE — PROGRESS NOTES
Mom called to reschedule patient's [facility] appointment that she had canceled.  Writer offered an appointment for 09/22/23 (soonest available).  Mom refused the appointment and asked that patient be seen sooner by either Dr. Nguyễn or Dr. Lescher.      Mom is requesting a call back.     Mom is aware provider is out of the office.    Subjective:     HPI: Josef Sage is a 47 y.o. male who was self-referred for sinusitis.    Patient reports bilateral nasal congestion with associated yellow/thick mucus.  Patient then developed low-grade fever left maxillary pressure, left puffy cheeks, sneezing, and itchy eyes.  Patient also would cough up yellow phlegm as well.  Patient use a Neti pot which seemed to help clear up the yellow phlegm but symptoms persisted.  Patient denies any hyposmia.  Patient reports 2-3 sinus infections a year but states that he felt those were may be viral in nature.  He states that usually they were not with discolored mucus and had more viral symptoms.    Current sinonasal medications include nasal saline rinse.  The last course of antibiotics was a long time ago.    He does not regularly use nasal decongestant sprays (afrin/oxymetazoline/phenylephrine).  He recalls previously having allergy testing but does not recall the results.  He relates a history of asthma which is currently managed with PRN albuterol.  He relates a history of reflux symptoms which is not currently managed with medication.    He denies a diagnosis of obstructive sleep apnea.   He has not had sinonasal surgery.    He does not recall a prior history of nasal trauma.  He has not had a tonsillectomy.  He is not a tobacco smoker.   He has NOT had S. pneumo titers checked.    Past Medical/Past Surgical History  Past Medical History:   Diagnosis Date    Bronchitis     Mixed hyperlipidemia 9/17/2019    Wheezes      He has a past surgical history that includes None and Mouth surgery.    Family History/Social History  His family history includes Hypertension in his father and mother.  He reports that he has never smoked. He has never used smokeless tobacco. He reports current alcohol use of about 1.0 standard drink per week. He reports that he does not use drugs.    Allergies/Immunizations  He is allergic to sulfa (sulfonamide antibiotics).  Immunization History    Administered Date(s) Administered    COVID-19, MRNA, LN-S, PF (Pfizer) (Purple Cap) 03/30/2021, 04/28/2021, 11/26/2021    Influenza - Quadrivalent - PF *Preferred* (6 months and older) 09/17/2019        Medications   amoxicillin  PROPECIA Tab     Review of Systems     Constitutional: Negative for chills and fever.      HENT: Positive for facial swelling (L), postnasal drip, runny nose, sinus infection, sinus pressure and stuffy nose.  Negative for ear discharge, ear pain, hearing loss and nosebleeds.      Respiratory:  Positive for cough. Negative for shortness of breath, snoring and wheezing.      Allergy: Negative for seasonal allergies.     Neurological: Negative for dizziness and headaches.      Hematologic: Negative for swollen glands.      Psychiatric: Negative for sleep disturbance.          Objective:     There were no vitals taken for this visit.       Constitutional:   He appears well-developed and well-nourished. Normal speech.      Head:  Normocephalic and atraumatic. Facial strength is normal.      Ears:    Right Ear: No drainage or swelling. Tympanic membrane is not perforated and not erythematous. No middle ear effusion.   Left Ear: No drainage or swelling. Tympanic membrane is not perforated and not erythematous.  No middle ear effusion.     Nose:  Mucosal edema and rhinorrhea present. No septal deviation or polyps.  No foreign bodies. Turbinates normal, no turbinate masses and no turbinate hypertrophy.  Right sinus exhibits no maxillary sinus tenderness and no frontal sinus tenderness. Left sinus exhibits maxillary sinus tenderness. Left sinus exhibits no frontal sinus tenderness.   White purulence near anterior L IT    Mouth/Throat  Oropharynx clear and moist without lesions or asymmetry, normal uvula midline and lips, teeth, and gums normal. No trismus or mucous membrane lesions. No oropharyngeal exudate, posterior oropharyngeal edema or posterior oropharyngeal erythema. Tonsils present, +1.       Neck:  Neck normal without thyromegaly masses, asymmetry, normal tracheal structure, crepitus, and tenderness and phonation normal.        Head (left side): Jugulodigastric adenopathy present.     He has no cervical adenopathy.     Pulmonary/Chest:   Effort normal.     Psychiatric:   He has a normal mood and affect. His speech is normal and behavior is normal.     Procedure    None    Data Reviewed  I personally reviewed the chart, including any outside records, and pertinent data below:    WBC (Thousand/uL)   Date Value   09/14/2019 6.7     Eosinophil % (%)   Date Value   09/14/2019 3.4     Eos # (cells/uL)   Date Value   09/14/2019 228     Platelets (Thousand/uL)   Date Value   09/14/2019 257     Glucose (mg/dL)   Date Value   09/14/2019 93     No results found for: IGE    No sinus imaging available.    Assessment & Plan:     1. Acute sinusitis, recurrence not specified, unspecified location  -     amoxicillin-clavulanate 875-125mg (AUGMENTIN) 875-125 mg per tablet; Take 1 tablet by mouth every 12 (twelve) hours. for 10 days  Dispense: 20 tablet; Refill: 0  -     I swabbed his nasal exudate for culture and will use the results to direct antibiotic therapy as indicated.  - Will consider strep pneumo titers if recurrent sinusitis episodes persist and are bacterial    2. Non-seasonal allergic rhinitis, unspecified trigger  -     Ambulatory referral/consult to Allergy; Future; Expected date: 03/29/2023  - Currently symptom free; possible triggers his acute sinusitis    3. Mild intermittent asthma without complication  -     albuterol (PROVENTIL/VENTOLIN HFA) 90 mcg/actuation inhaler; Inhale 1-2 puffs into the lungs every 6 (six) hours as needed for Wheezing.  Dispense: 6.7 g; Refill: 1    He will Follow up in about 3 weeks (around 4/12/2023) for re-evaluate post treatment, possible nasal endoscopy.  I had a discussion with the patient regarding his condition and the further workup and management options.    All  questions were answered, and the patient is in agreement with the above.     Disclaimer:  This note may have been prepared utilizing voice recognition software which may result in occasional typographical errors in the text such as sound alike words.   If further clarification is needed, please contact the ENT department of [facility].

## 2023-07-27 ENCOUNTER — TELEPHONE (OUTPATIENT)
Dept: GASTROENTEROLOGY | Facility: CLINIC | Age: 48
End: 2023-07-27
Payer: COMMERCIAL

## 2023-07-27 NOTE — TELEPHONE ENCOUNTER
Spoke w/pt regarding message below and scheduled pt to be seen by Dr. Wright on 8/3 at 3 pm [facility] location. Pt verbalize understand, confirmed appt and thank me.     ----- Message from Fátima Albert sent at 7/27/2023  2:53 PM CDT -----  Regarding: Please call pt back General discomfort, chronic IBS, referred by sister who sees   Contact: [phone]  Please call pt back General discomfort, chronic IBS, referred by sister who sees . Please call and follow up with the patient [phone]

## 2023-08-03 ENCOUNTER — LAB VISIT (OUTPATIENT)
Dept: LAB | Facility: HOSPITAL | Age: 48
End: 2023-08-03
Attending: STUDENT IN AN ORGANIZED HEALTH CARE EDUCATION/TRAINING PROGRAM
Payer: COMMERCIAL

## 2023-08-03 ENCOUNTER — OFFICE VISIT (OUTPATIENT)
Dept: GASTROENTEROLOGY | Facility: CLINIC | Age: 48
End: 2023-08-03
Payer: COMMERCIAL

## 2023-08-03 VITALS
HEIGHT: 71 IN | HEART RATE: 91 BPM | BODY MASS INDEX: 28.67 KG/M2 | WEIGHT: 204.81 LBS | DIASTOLIC BLOOD PRESSURE: 89 MMHG | SYSTOLIC BLOOD PRESSURE: 127 MMHG

## 2023-08-03 DIAGNOSIS — R10.9 ABDOMINAL PAIN, UNSPECIFIED ABDOMINAL LOCATION: ICD-10-CM

## 2023-08-03 DIAGNOSIS — R19.7 DIARRHEA, UNSPECIFIED TYPE: ICD-10-CM

## 2023-08-03 DIAGNOSIS — R10.9 ABDOMINAL PAIN, UNSPECIFIED ABDOMINAL LOCATION: Primary | ICD-10-CM

## 2023-08-03 LAB
BASOPHILS # BLD AUTO: 0.05 K/UL (ref 0–0.2)
BASOPHILS NFR BLD: 0.7 % (ref 0–1.9)
DIFFERENTIAL METHOD: NORMAL
EOSINOPHIL # BLD AUTO: 0.2 K/UL (ref 0–0.5)
EOSINOPHIL NFR BLD: 2.3 % (ref 0–8)
ERYTHROCYTE [DISTWIDTH] IN BLOOD BY AUTOMATED COUNT: 12.3 % (ref 11.5–14.5)
FERRITIN SERPL-MCNC: 160 NG/ML (ref 20–300)
HCT VFR BLD AUTO: 44.6 % (ref 40–54)
HGB BLD-MCNC: 15.3 G/DL (ref 14–18)
IMM GRANULOCYTES # BLD AUTO: 0.02 K/UL (ref 0–0.04)
IMM GRANULOCYTES NFR BLD AUTO: 0.3 % (ref 0–0.5)
IRON SERPL-MCNC: 107 UG/DL (ref 45–160)
LYMPHOCYTES # BLD AUTO: 2.7 K/UL (ref 1–4.8)
LYMPHOCYTES NFR BLD: 38.4 % (ref 18–48)
MCH RBC QN AUTO: 28.4 PG (ref 27–31)
MCHC RBC AUTO-ENTMCNC: 34.3 G/DL (ref 32–36)
MCV RBC AUTO: 83 FL (ref 82–98)
MONOCYTES # BLD AUTO: 0.5 K/UL (ref 0.3–1)
MONOCYTES NFR BLD: 7.5 % (ref 4–15)
NEUTROPHILS # BLD AUTO: 3.5 K/UL (ref 1.8–7.7)
NEUTROPHILS NFR BLD: 50.8 % (ref 38–73)
NRBC BLD-RTO: 0 /100 WBC
PLATELET # BLD AUTO: 296 K/UL (ref 150–450)
PMV BLD AUTO: 9.6 FL (ref 9.2–12.9)
RBC # BLD AUTO: 5.39 M/UL (ref 4.6–6.2)
SATURATED IRON: 28 % (ref 20–50)
TOTAL IRON BINDING CAPACITY: 385 UG/DL (ref 250–450)
TRANSFERRIN SERPL-MCNC: 260 MG/DL (ref 200–375)
WBC # BLD AUTO: 6.93 K/UL (ref 3.9–12.7)

## 2023-08-03 PROCEDURE — 82728 ASSAY OF FERRITIN: CPT | Performed by: STUDENT IN AN ORGANIZED HEALTH CARE EDUCATION/TRAINING PROGRAM

## 2023-08-03 PROCEDURE — 3079F DIAST BP 80-89 MM HG: CPT | Mod: CPTII,S$GLB,, | Performed by: STUDENT IN AN ORGANIZED HEALTH CARE EDUCATION/TRAINING PROGRAM

## 2023-08-03 PROCEDURE — 85025 COMPLETE CBC W/AUTO DIFF WBC: CPT | Performed by: STUDENT IN AN ORGANIZED HEALTH CARE EDUCATION/TRAINING PROGRAM

## 2023-08-03 PROCEDURE — 84466 ASSAY OF TRANSFERRIN: CPT | Performed by: STUDENT IN AN ORGANIZED HEALTH CARE EDUCATION/TRAINING PROGRAM

## 2023-08-03 PROCEDURE — 99204 OFFICE O/P NEW MOD 45 MIN: CPT | Mod: S$GLB,,, | Performed by: STUDENT IN AN ORGANIZED HEALTH CARE EDUCATION/TRAINING PROGRAM

## 2023-08-03 PROCEDURE — 99204 PR OFFICE/OUTPT VISIT, NEW, LEVL IV, 45-59 MIN: ICD-10-PCS | Mod: S$GLB,,, | Performed by: STUDENT IN AN ORGANIZED HEALTH CARE EDUCATION/TRAINING PROGRAM

## 2023-08-03 PROCEDURE — 1159F MED LIST DOCD IN RCRD: CPT | Mod: CPTII,S$GLB,, | Performed by: STUDENT IN AN ORGANIZED HEALTH CARE EDUCATION/TRAINING PROGRAM

## 2023-08-03 PROCEDURE — 3008F PR BODY MASS INDEX (BMI) DOCUMENTED: ICD-10-PCS | Mod: CPTII,S$GLB,, | Performed by: STUDENT IN AN ORGANIZED HEALTH CARE EDUCATION/TRAINING PROGRAM

## 2023-08-03 PROCEDURE — 99999 PR PBB SHADOW E&M-EST. PATIENT-LVL III: ICD-10-PCS | Mod: PBBFAC,,, | Performed by: STUDENT IN AN ORGANIZED HEALTH CARE EDUCATION/TRAINING PROGRAM

## 2023-08-03 PROCEDURE — 1159F PR MEDICATION LIST DOCUMENTED IN MEDICAL RECORD: ICD-10-PCS | Mod: CPTII,S$GLB,, | Performed by: STUDENT IN AN ORGANIZED HEALTH CARE EDUCATION/TRAINING PROGRAM

## 2023-08-03 PROCEDURE — 36415 COLL VENOUS BLD VENIPUNCTURE: CPT | Performed by: STUDENT IN AN ORGANIZED HEALTH CARE EDUCATION/TRAINING PROGRAM

## 2023-08-03 PROCEDURE — 3074F PR MOST RECENT SYSTOLIC BLOOD PRESSURE < 130 MM HG: ICD-10-PCS | Mod: CPTII,S$GLB,, | Performed by: STUDENT IN AN ORGANIZED HEALTH CARE EDUCATION/TRAINING PROGRAM

## 2023-08-03 PROCEDURE — 86364 TISS TRNSGLTMNASE EA IG CLAS: CPT | Performed by: STUDENT IN AN ORGANIZED HEALTH CARE EDUCATION/TRAINING PROGRAM

## 2023-08-03 PROCEDURE — 3074F SYST BP LT 130 MM HG: CPT | Mod: CPTII,S$GLB,, | Performed by: STUDENT IN AN ORGANIZED HEALTH CARE EDUCATION/TRAINING PROGRAM

## 2023-08-03 PROCEDURE — 99999 PR PBB SHADOW E&M-EST. PATIENT-LVL III: CPT | Mod: PBBFAC,,, | Performed by: STUDENT IN AN ORGANIZED HEALTH CARE EDUCATION/TRAINING PROGRAM

## 2023-08-03 PROCEDURE — 3008F BODY MASS INDEX DOCD: CPT | Mod: CPTII,S$GLB,, | Performed by: STUDENT IN AN ORGANIZED HEALTH CARE EDUCATION/TRAINING PROGRAM

## 2023-08-03 PROCEDURE — 3079F PR MOST RECENT DIASTOLIC BLOOD PRESSURE 80-89 MM HG: ICD-10-PCS | Mod: CPTII,S$GLB,, | Performed by: STUDENT IN AN ORGANIZED HEALTH CARE EDUCATION/TRAINING PROGRAM

## 2023-08-03 RX ORDER — DICYCLOMINE HYDROCHLORIDE 10 MG/1
10 CAPSULE ORAL
Qty: 120 CAPSULE | Refills: 0 | Status: SHIPPED | OUTPATIENT
Start: 2023-08-03 | End: 2023-09-02

## 2023-08-03 NOTE — PROGRESS NOTES
"    [facility] Consultation Note    Reason for Consult:  abdominal pain, diarrhea     PCP:   No, Primary Doctor       Referring MD:  No referring provider defined for this encounter.    HPI:  This is a 47 y.o. male here for evaluation of abdominal pain. PMHx of HLD, Asthma, Anxiety. He states he has had issue with central abdominal cramping and loose stools for years. Saw a GI doctor 15 years ago who told him he had IBS. Work up then included some blood work and a colonoscopy which was normal. He has 2-3 loose stools daily without alternating constipation. Also has cramping which is improved after BM. Food triggers for symptoms include spicy food and dairy. No weight loss. No reflux, dysphagia, N/V, blood in stool. Blood work 5/2023 showed mild anemia but this was one day post neck surgery. BMP wnl. Patient had recent neck surgery 5/2023 for neck pain and radiculopathy which was without complication (ACD C5-6). Prior to neck surgery he was using 800mg advil daily. None in the past 2 months. He has a cousin with CRC. No Fh of gastric cancer, celiac or IBD. Also admits to some anal itching. Also has bloating and gas. Tried PPI on a PRN basis but this did not help.         Objective Findings:    Vital Signs:  /89   Pulse 91   Ht 5' 11" (1.803 m)   Wt 92.9 kg (204 lb 12.9 oz)   BMI 28.56 kg/m²   Body mass index is 28.56 kg/m².    Physical Exam:  General Appearance: Well appearing in no acute distress  Head:   Normocephalic, without obvious abnormality  Eyes:    No scleral icterus    Lungs: CTA bilaterally in anterior and posterior fields   Heart:  Regular rate and rhythm, no murmurs heard  Abdomen: Soft, non tender, non distended with positive bowel sounds in all four quadrants.      Imaging:  Reviewed     Endoscopy:    None     Assessment:    Abdominal pain   Diarrhea   CRC Screening      Patient with years of abdominal cramping and loose stool without alarm features. Likely IBS. " Recommended fiber daily and bentyl. He is due for cscope for CRC screening and would plan for random biopsies to rule out microscopic colitis at that time. Also NSAID use may be contributing and it is good he has been able to stop these. Repeat CBC and iron panel, celiac panel. HP stool test for abdominal pain.     Recommendations:     - Repeat CBC with iron panel. Drop in Hg likely post op   - Celiac panel, HP stool test   - Start fiber   - Bentyl PRN   - He is due for CRC screening with colonoscopy      RTC 3 months     Order summary:  Orders Placed This Encounter    IRON AND TIBC    FERRITIN    Celiac Disease Panel    H. pylori antigen, stool    CBC Auto Differential    dicyclomine (BENTYL) 10 MG capsule         Thank you so much for allowing me to participate in the care of Josef Wright MD  Gastroenterology   [facility]

## 2023-08-04 ENCOUNTER — PATIENT MESSAGE (OUTPATIENT)
Dept: ENDOSCOPY | Facility: HOSPITAL | Age: 48
End: 2023-08-04
Payer: COMMERCIAL

## 2023-08-04 ENCOUNTER — TELEPHONE (OUTPATIENT)
Dept: ENDOSCOPY | Facility: HOSPITAL | Age: 48
End: 2023-08-04
Payer: COMMERCIAL

## 2023-08-04 NOTE — TELEPHONE ENCOUNTER
"   Message  Received: Yesterday  Christianne Wright MD Piglia, Ashley, RN  Caller: Unspecified (Yesterday,  3:25 PM)  Procedure: Colonoscopy     Diagnosis: Screening colonoscopy and Diarrhea     Procedure Timin-4 weeks     *If within 4 weeks selected, please rizwan as high priority*     *If greater than 12 weeks, please select "5-12 weeks" and delay sending until 2 months prior to requested date*     Provider: Myself     Location: [facility]     Additional Scheduling Information: No scheduling concerns     Prep Specifications:Standard prep     Have you attached a patient to this message: yes    "

## 2023-08-07 LAB
GLIADIN PEPTIDE IGA SER-ACNC: 0.9 U/ML
GLIADIN PEPTIDE IGG SER-ACNC: 3.1 U/ML
IGA SERPL-MCNC: 231 MG/DL (ref 70–400)
TTG IGA SER-ACNC: 0.5 U/ML
TTG IGG SER-ACNC: <0.6 U/ML

## 2023-08-10 ENCOUNTER — PATIENT MESSAGE (OUTPATIENT)
Dept: ENDOSCOPY | Facility: HOSPITAL | Age: 48
End: 2023-08-10
Payer: COMMERCIAL

## 2023-08-10 DIAGNOSIS — Z12.11 COLON CANCER SCREENING: Primary | ICD-10-CM

## 2023-08-10 DIAGNOSIS — R19.7 DIARRHEA, UNSPECIFIED TYPE: ICD-10-CM

## 2023-08-16 ENCOUNTER — PATIENT MESSAGE (OUTPATIENT)
Dept: NEUROSURGERY | Facility: CLINIC | Age: 48
End: 2023-08-16
Payer: COMMERCIAL

## 2023-08-21 ENCOUNTER — HOSPITAL ENCOUNTER (OUTPATIENT)
Dept: RADIOLOGY | Facility: HOSPITAL | Age: 48
Discharge: HOME | End: 2023-08-21
Attending: NURSE PRACTITIONER
Payer: COMMERCIAL

## 2023-08-21 DIAGNOSIS — M48.02 SPINAL STENOSIS, CERVICAL REGION: ICD-10-CM

## 2023-08-21 DIAGNOSIS — Z98.1 S/P CERVICAL SPINAL FUSION: ICD-10-CM

## 2023-08-21 PROCEDURE — 72125 CT NECK SPINE W/O DYE: CPT | Mod: 26,,, | Performed by: RADIOLOGY

## 2023-08-21 PROCEDURE — 72125 CT CERVICAL SPINE WITHOUT CONTRAST: ICD-10-PCS | Mod: 26,,, | Performed by: RADIOLOGY

## 2023-08-21 PROCEDURE — 72125 CT NECK SPINE W/O DYE: CPT | Mod: TC

## 2023-08-24 ENCOUNTER — PATIENT MESSAGE (OUTPATIENT)
Dept: ENDOSCOPY | Facility: HOSPITAL | Age: 48
End: 2023-08-24
Payer: COMMERCIAL

## 2023-08-24 DIAGNOSIS — R11.0 NAUSEA: Primary | ICD-10-CM

## 2023-08-24 RX ORDER — ONDANSETRON 4 MG/1
4 TABLET, FILM COATED ORAL EVERY 8 HOURS PRN
Qty: 30 TABLET | Refills: 0 | Status: SHIPPED | OUTPATIENT
Start: 2023-08-24 | End: 2023-09-21

## 2023-08-31 ENCOUNTER — OFFICE VISIT (OUTPATIENT)
Dept: NEUROSURGERY | Facility: CLINIC | Age: 48
End: 2023-08-31
Payer: COMMERCIAL

## 2023-08-31 VITALS — HEART RATE: 108 BPM | DIASTOLIC BLOOD PRESSURE: 89 MMHG | SYSTOLIC BLOOD PRESSURE: 126 MMHG

## 2023-08-31 DIAGNOSIS — Z98.1 HISTORY OF FUSION OF CERVICAL SPINE: Primary | ICD-10-CM

## 2023-08-31 PROCEDURE — 3079F DIAST BP 80-89 MM HG: CPT | Mod: CPTII,S$GLB,, | Performed by: NEUROLOGICAL SURGERY

## 2023-08-31 PROCEDURE — 99999 PR PBB SHADOW E&M-EST. PATIENT-LVL III: ICD-10-PCS | Mod: PBBFAC,,, | Performed by: NEUROLOGICAL SURGERY

## 2023-08-31 PROCEDURE — 1159F PR MEDICATION LIST DOCUMENTED IN MEDICAL RECORD: ICD-10-PCS | Mod: CPTII,S$GLB,, | Performed by: NEUROLOGICAL SURGERY

## 2023-08-31 PROCEDURE — 99213 PR OFFICE/OUTPT VISIT, EST, LEVL III, 20-29 MIN: ICD-10-PCS | Mod: S$GLB,,, | Performed by: NEUROLOGICAL SURGERY

## 2023-08-31 PROCEDURE — 99999 PR PBB SHADOW E&M-EST. PATIENT-LVL III: CPT | Mod: PBBFAC,,, | Performed by: NEUROLOGICAL SURGERY

## 2023-08-31 PROCEDURE — 3074F SYST BP LT 130 MM HG: CPT | Mod: CPTII,S$GLB,, | Performed by: NEUROLOGICAL SURGERY

## 2023-08-31 PROCEDURE — 99213 OFFICE O/P EST LOW 20 MIN: CPT | Mod: S$GLB,,, | Performed by: NEUROLOGICAL SURGERY

## 2023-08-31 PROCEDURE — 1159F MED LIST DOCD IN RCRD: CPT | Mod: CPTII,S$GLB,, | Performed by: NEUROLOGICAL SURGERY

## 2023-08-31 PROCEDURE — 3079F PR MOST RECENT DIASTOLIC BLOOD PRESSURE 80-89 MM HG: ICD-10-PCS | Mod: CPTII,S$GLB,, | Performed by: NEUROLOGICAL SURGERY

## 2023-08-31 PROCEDURE — 3074F PR MOST RECENT SYSTOLIC BLOOD PRESSURE < 130 MM HG: ICD-10-PCS | Mod: CPTII,S$GLB,, | Performed by: NEUROLOGICAL SURGERY

## 2023-08-31 NOTE — PROGRESS NOTES
Neurosurgery  Established Patient    SUBJECTIVE:     History of Present Illness: Josef Sage is a 47 y.o. male s/p ACD C5-6 with Dr. Dalton on 5/10/23. The patient is being seen in clinic today for his 3 month post-op evaluation. We have obtained a CT C spine interim.       Interval Hx 8/31/23: Patient states he is doing well. He has had complete resolution of his radiculopathy. He does have some residual numbness in his thumb and index finger on the right. His only complaint is neck stiffness posteriorly that bothers his sleep some times. He has been fairly inactive since surgery and would like to get back to exercising and feels that some PT would help his pain. CT C spine was completed recently and shows early bony fusion.      Review of patient's allergies indicates:   Allergen Reactions    Sulfa (sulfonamide antibiotics) Rash       Current Outpatient Medications   Medication Sig Dispense Refill    albuterol (PROVENTIL/VENTOLIN HFA) 90 mcg/actuation inhaler Inhale 1-2 puffs into the lungs every 6 (six) hours as needed for Wheezing. 6.7 g 1    ARNICA FLOWER, BULK, MISC Take by mouth Daily. One capsule      calcium carbonate/vitamin D3 (VITAMIN D-3 ORAL)       dicyclomine (BENTYL) 10 MG capsule Take 1 capsule (10 mg total) by mouth before meals and at bedtime as needed (abdominal cramping). 120 capsule 0    EScitalopram oxalate (LEXAPRO) 10 MG tablet Take 10 mg by mouth every evening.      fluticasone propionate (FLONASE) 50 mcg/actuation nasal spray 2 sprays (100 mcg total) by Each Nostril route 2 (two) times daily. 16 g 11    multivitamin capsule Take 1 capsule by mouth once daily.      ondansetron (ZOFRAN) 4 MG tablet Take 1 tablet (4 mg total) by mouth every 8 (eight) hours as needed for Nausea. 30 tablet 0    ondansetron (ZOFRAN-ODT) 8 MG TbDL Dissolve 1 tablet (8 mg total) by mouth every 6 (six) hours as needed (nausea). 28 tablet 0    oxyCODONE (ROXICODONE) 10 mg Tab immediate release tablet Take 1 tablet  (10 mg total) by mouth every 4 (four) hours as needed (pain 6-7/10). 42 tablet 0    PROPECIA 1 mg tablet Take 1 tablet (1 mg total) by mouth once daily. 30 tablet 2     No current facility-administered medications for this visit.       Past Medical History:   Diagnosis Date    Anxiety     Asthma     Bronchitis     Cervical spondylosis with radiculopathy 5/10/2023    GERD (gastroesophageal reflux disease)     Mixed hyperlipidemia 09/17/2019    Recurrent upper respiratory infection (URI)     Wheezes      Past Surgical History:   Procedure Laterality Date    ANTERIOR CERVICAL DISCECTOMY W/ FUSION N/A 5/10/2023    Procedure: C5-6 ACDF;  Surgeon: Ivan Dalton MD;  Location: [facility] OR [address] [place] [address];  Service: Neurosurgery;  Laterality: N/A;  C5-6 ACDF  anesthesia: general  nerve mon: EMG,SEP, MEP  radiology; C-ARM  bed: regular slider  headrest: caspar, GW tongs/hanging weights/ surgical pillow  misc; interbody paddle distractor, nerve root retractor (depuy)    MOUTH SURGERY      None       Family History       Problem Relation (Age of Onset)    Eczema Sister    Hypertension Mother, Father          Social History     Socioeconomic History    Marital status: Single    Number of children: 0   Occupational History    Occupation: Internal medicine physician      Comment: Academics   Tobacco Use    Smoking status: Never    Smokeless tobacco: Never   Substance and Sexual Activity    Alcohol use: Never     Alcohol/week: 1.0 standard drink of alcohol     Types: 1 Glasses of wine per week    Drug use: No     Comment: CBD gummies    Sexual activity: Yes     Partners: Female     Comment: Not    Social History Narrative    Stairs     Social Determinants of Health     Stress: No Stress Concern Present (12/23/2020)    [employer] [employer] - Occupational Stress Questionnaire     Feeling of Stress : Not at all       Review of Systems   Constitutional:  Negative for activity change, appetite change and fever.   HENT:   Negative for hearing loss and postnasal drip.    Eyes:  Negative for pain and visual disturbance.   Respiratory:  Negative for shortness of breath.    Cardiovascular:  Negative for chest pain and palpitations.   Gastrointestinal:  Negative for abdominal pain.   Endocrine: Negative for polydipsia, polyphagia and polyuria.   Musculoskeletal:  Positive for myalgias and neck stiffness. Negative for back pain and gait problem.   Neurological:  Positive for numbness. Negative for dizziness, weakness and headaches.   Psychiatric/Behavioral:  Negative for confusion and dysphoric mood.        OBJECTIVE:     Vital Signs     There is no height or weight on file to calculate BMI.    Neurosurgery Physical Exam  General: well developed, well nourished, no distress.   Head: normocephalic, atraumatic  Neurologic: Alert and oriented. Thought content appropriate.  GCS: Motor: 6/Verbal: 5/Eyes: 4 GCS Total: 15  Mental Status: Awake, Alert, Oriented x 4  Language: No aphasia  Speech: No dysarthria  Cranial nerves: face symmetric, tongue midline, CN II-XII grossly intact.   Eyes: pupils equal, round, reactive to light with accomodation, EOMI.   Pulmonary: normal respirations, no signs of respiratory distress  Abdomen: soft, non-distended  Skin: Skin is warm, dry and intact. Incision well-healed.   Sensory: intact to light touch throughout except numbness to thumb and index finger of right hand  Motor Strength:Moves all extremities spontaneously with good tone.  Full strength upper and lower extremities.     Diagnostic Results:  CT C spine dated 8/21/23:  Post op changes from C5/C6 ACDF with early changes of bony fusion. Stable alignment.  Hardware in good position.       ASSESSMENT/PLAN:   Josef Sage is a 47 y.o. male s/p ACD C5-6 with Dr. Dalton on 5/10/23. The patient was seen in clinic today for his 3 month post-op evaluation. His recent post op CT C spine showed early but good bony fusion.     Plan:  RTC in 1 year for routine post  op follow up  Ok to resume normal activities

## 2023-09-01 ENCOUNTER — PATIENT MESSAGE (OUTPATIENT)
Dept: ENDOSCOPY | Facility: HOSPITAL | Age: 48
End: 2023-09-01
Payer: COMMERCIAL

## 2023-09-05 ENCOUNTER — PATIENT MESSAGE (OUTPATIENT)
Dept: ENDOSCOPY | Facility: HOSPITAL | Age: 48
End: 2023-09-05
Payer: COMMERCIAL

## 2023-09-08 ENCOUNTER — PATIENT MESSAGE (OUTPATIENT)
Dept: ADMINISTRATIVE | Facility: OTHER | Age: 48
End: 2023-09-08
Payer: COMMERCIAL

## 2023-09-11 ENCOUNTER — PATIENT MESSAGE (OUTPATIENT)
Dept: ADMINISTRATIVE | Facility: OTHER | Age: 48
End: 2023-09-11
Payer: COMMERCIAL

## 2023-09-12 ENCOUNTER — PATIENT MESSAGE (OUTPATIENT)
Dept: ADMINISTRATIVE | Facility: OTHER | Age: 48
End: 2023-09-12
Payer: COMMERCIAL

## 2023-10-19 ENCOUNTER — TELEPHONE (OUTPATIENT)
Dept: ENDOSCOPY | Facility: HOSPITAL | Age: 48
End: 2023-10-19
Payer: COMMERCIAL

## 2023-10-24 DIAGNOSIS — Z12.11 SCREEN FOR COLON CANCER: Primary | ICD-10-CM

## 2023-10-26 ENCOUNTER — TELEPHONE (OUTPATIENT)
Dept: ENDOSCOPY | Facility: HOSPITAL | Age: 48
End: 2023-10-26
Payer: COMMERCIAL

## 2023-10-26 ENCOUNTER — TELEPHONE (OUTPATIENT)
Dept: GASTROENTEROLOGY | Facility: CLINIC | Age: 48
End: 2023-10-26
Payer: COMMERCIAL

## 2023-10-26 NOTE — TELEPHONE ENCOUNTER
----- Message from Fátima Albert sent at 10/26/2023  8:34 AM CDT -----  Regarding: Urgent for today //Patient cancelling procedure for today, will call back to reschedule 10/26/2023  Contact: @[phone]  Regarding:Patient cancelling procedure for today, will call back to reschedule 10/26/2023    Contact: Josef    Type: Call back to advise    Who Called: Josef    Would the patient rather a call back or a response via [facility]? Phone call    Best Call Back Number: [phone]

## 2023-11-08 ENCOUNTER — PATIENT MESSAGE (OUTPATIENT)
Dept: GASTROENTEROLOGY | Facility: CLINIC | Age: 48
End: 2023-11-08
Payer: COMMERCIAL

## 2023-11-09 DIAGNOSIS — R11.0 NAUSEA: Primary | ICD-10-CM

## 2023-11-09 RX ORDER — ONDANSETRON 8 MG/1
8 TABLET, ORALLY DISINTEGRATING ORAL EVERY 6 HOURS PRN
Qty: 28 TABLET | Refills: 0 | OUTPATIENT
Start: 2023-11-09 | End: 2023-12-07

## 2023-11-13 ENCOUNTER — OFFICE VISIT (OUTPATIENT)
Dept: PRIMARY CARE CLINIC | Facility: CLINIC | Age: 48
End: 2023-11-13
Payer: COMMERCIAL

## 2023-11-13 VITALS
TEMPERATURE: 98 F | BODY MASS INDEX: 26.79 KG/M2 | RESPIRATION RATE: 18 BRPM | OXYGEN SATURATION: 98 % | HEIGHT: 71 IN | HEART RATE: 88 BPM | WEIGHT: 191.38 LBS | SYSTOLIC BLOOD PRESSURE: 116 MMHG | DIASTOLIC BLOOD PRESSURE: 64 MMHG

## 2023-11-13 DIAGNOSIS — L65.9 ALOPECIA: ICD-10-CM

## 2023-11-13 DIAGNOSIS — Z11.3 SCREEN FOR SEXUALLY TRANSMITTED DISEASES: ICD-10-CM

## 2023-11-13 DIAGNOSIS — E55.9 VITAMIN D DEFICIENCY: ICD-10-CM

## 2023-11-13 DIAGNOSIS — R68.82 DECREASED LIBIDO: ICD-10-CM

## 2023-11-13 DIAGNOSIS — Z13.220 ENCOUNTER FOR LIPID SCREENING FOR CARDIOVASCULAR DISEASE: ICD-10-CM

## 2023-11-13 DIAGNOSIS — Z00.00 ANNUAL PHYSICAL EXAM: Primary | ICD-10-CM

## 2023-11-13 DIAGNOSIS — R53.81 PHYSICAL DECONDITIONING: ICD-10-CM

## 2023-11-13 DIAGNOSIS — Z12.11 SCREEN FOR COLON CANCER: ICD-10-CM

## 2023-11-13 DIAGNOSIS — Z13.6 ENCOUNTER FOR LIPID SCREENING FOR CARDIOVASCULAR DISEASE: ICD-10-CM

## 2023-11-13 DIAGNOSIS — J45.20 MILD INTERMITTENT ASTHMA WITHOUT COMPLICATION: ICD-10-CM

## 2023-11-13 DIAGNOSIS — Z98.1 S/P CERVICAL SPINAL FUSION: ICD-10-CM

## 2023-11-13 DIAGNOSIS — F41.8 MIXED ANXIETY AND DEPRESSIVE DISORDER: ICD-10-CM

## 2023-11-13 DIAGNOSIS — R53.83 FATIGUE, UNSPECIFIED TYPE: ICD-10-CM

## 2023-11-13 PROCEDURE — 1160F RVW MEDS BY RX/DR IN RCRD: CPT | Mod: CPTII,S$GLB,, | Performed by: FAMILY MEDICINE

## 2023-11-13 PROCEDURE — 99999 PR PBB SHADOW E&M-EST. PATIENT-LVL V: CPT | Mod: PBBFAC,,, | Performed by: FAMILY MEDICINE

## 2023-11-13 PROCEDURE — 1159F MED LIST DOCD IN RCRD: CPT | Mod: CPTII,S$GLB,, | Performed by: FAMILY MEDICINE

## 2023-11-13 PROCEDURE — 1159F PR MEDICATION LIST DOCUMENTED IN MEDICAL RECORD: ICD-10-PCS | Mod: CPTII,S$GLB,, | Performed by: FAMILY MEDICINE

## 2023-11-13 PROCEDURE — 3078F PR MOST RECENT DIASTOLIC BLOOD PRESSURE < 80 MM HG: ICD-10-PCS | Mod: CPTII,S$GLB,, | Performed by: FAMILY MEDICINE

## 2023-11-13 PROCEDURE — 1160F PR REVIEW ALL MEDS BY PRESCRIBER/CLIN PHARMACIST DOCUMENTED: ICD-10-PCS | Mod: CPTII,S$GLB,, | Performed by: FAMILY MEDICINE

## 2023-11-13 PROCEDURE — 99999 PR PBB SHADOW E&M-EST. PATIENT-LVL V: ICD-10-PCS | Mod: PBBFAC,,, | Performed by: FAMILY MEDICINE

## 2023-11-13 PROCEDURE — 3008F PR BODY MASS INDEX (BMI) DOCUMENTED: ICD-10-PCS | Mod: CPTII,S$GLB,, | Performed by: FAMILY MEDICINE

## 2023-11-13 PROCEDURE — 3074F SYST BP LT 130 MM HG: CPT | Mod: CPTII,S$GLB,, | Performed by: FAMILY MEDICINE

## 2023-11-13 PROCEDURE — 99396 PR PREVENTIVE VISIT,EST,40-64: ICD-10-PCS | Mod: S$GLB,,, | Performed by: FAMILY MEDICINE

## 2023-11-13 PROCEDURE — 3078F DIAST BP <80 MM HG: CPT | Mod: CPTII,S$GLB,, | Performed by: FAMILY MEDICINE

## 2023-11-13 PROCEDURE — 3074F PR MOST RECENT SYSTOLIC BLOOD PRESSURE < 130 MM HG: ICD-10-PCS | Mod: CPTII,S$GLB,, | Performed by: FAMILY MEDICINE

## 2023-11-13 PROCEDURE — 99396 PREV VISIT EST AGE 40-64: CPT | Mod: S$GLB,,, | Performed by: FAMILY MEDICINE

## 2023-11-13 PROCEDURE — 3008F BODY MASS INDEX DOCD: CPT | Mod: CPTII,S$GLB,, | Performed by: FAMILY MEDICINE

## 2023-11-13 RX ORDER — ESCITALOPRAM OXALATE 10 MG/1
10 TABLET ORAL NIGHTLY
Qty: 90 TABLET | Refills: 3 | Status: SHIPPED | OUTPATIENT
Start: 2023-11-13

## 2023-11-13 RX ORDER — DICYCLOMINE HYDROCHLORIDE 10 MG/1
10 CAPSULE ORAL 3 TIMES DAILY PRN
COMMUNITY

## 2023-11-13 NOTE — PROGRESS NOTES
"Subjective:       Patient ID: Josef Sage is a 47 y.o. male.    Chief Complaint: Establish Care (Will like to hold on colonoscopy )    HPI 46 y/o male is here for annual exam and to establish care.     He has been more tired over the past year, he endorses decreased libido, thinks this is secondary to Propecia, he is currently weaning off while starting topicals. He is s/p C5-C6 ACDF 5/2023, he is not very active, no doing any dedicated exercise. He denies f, rare nausea, zofran prn, he denies v/heartburn/constipation, using Bentyl 2 days a week for diarrhea and cramping which has helped, he denies cp/sob/urinary sx. Sleeping fair, he has trouble falling asleep but cannot stay asleep, he sleeps from 4 am to 8 am, he works from 8 am-11 am , sleeps from 11 am to 3 pm, then is up till he works again at 12 am-3 am. He is trying to eat healthy. Mood good.     He has been on Lexapro 10 mg since 2017 feels it caused weight gain and fatigue but helps with his anxiety, he tried effexor in the past and it caused shakiness.     Never had Covid  Mixed anxiety and depression: Lexapro 10 mg daily  Asthma: was on advair in the past but stopped, albuterol as needed, last used over 6 mo ago, tends to be worse in the winter  Hx of IBS: scope reported as normal in his 30's, following with GI, now on Bentyl, willing for scope  Optho: [facility] Trammell [facility] Retina, [facility] Joycelyn Giron: propecia 0.5 mg daily, using topicals as well      Review of Systems  see HPI  Objective:      /64 (BP Location: Right arm, Patient Position: Sitting, BP Method: Small (Manual))   Pulse 88   Temp 98.2 °F (36.8 °C) (Oral)   Resp 18   Ht 5' 11" (1.803 m)   Wt 86.8 kg (191 lb 5.8 oz)   SpO2 98%   BMI 26.69 kg/m²   Physical Exam  Vitals and nursing note reviewed.   Constitutional:       Appearance: He is well-developed.   HENT:      Head: Normocephalic and atraumatic.      Right Ear: Tympanic membrane " normal.      Left Ear: Tympanic membrane normal.      Mouth/Throat:      Pharynx: No oropharyngeal exudate or posterior oropharyngeal erythema.   Neck:      Thyroid: No thyromegaly.   Cardiovascular:      Rate and Rhythm: Normal rate and regular rhythm.      Heart sounds: Normal heart sounds.   Pulmonary:      Effort: Pulmonary effort is normal. No respiratory distress.      Breath sounds: Normal breath sounds.   Abdominal:      General: Bowel sounds are normal. There is no distension.      Palpations: Abdomen is soft. There is no mass.      Tenderness: There is no abdominal tenderness.   Musculoskeletal:      Cervical back: Normal range of motion and neck supple.      Right lower leg: No edema.      Left lower leg: No edema.   Lymphadenopathy:      Cervical: No cervical adenopathy.   Skin:     General: Skin is warm and dry.   Neurological:      Mental Status: He is alert.         Assessment:       1. Annual physical exam    2. Alopecia    3. Mixed anxiety and depressive disorder    4. Screen for colon cancer    5. Vitamin D deficiency    6. Fatigue, unspecified type    7. Screen for sexually transmitted diseases    8. Decreased libido    9. Encounter for lipid screening for cardiovascular disease    10. Physical deconditioning    11. S/P cervical spinal fusion    12. Mild intermittent asthma without complication        Plan:   Josef was seen today for establish care.    Diagnoses and all orders for this visit:    Annual physical exam  -     CBC Auto Differential; Future  -     Comprehensive Metabolic Panel; Future  -     Hemoglobin A1C; Future  -     Lipid Panel; Future  -     TSH; Future  -     Vitamin D; Future  -     Iron and TIBC; Future  -     Folate; Future  -     Ferritin; Future  -     Vitamin B12; Future  -     TESTOSTERONE PANEL; Future  -     C. trachomatis/N. gonorrhoeae by AMP DNA; Future  -     Hepatitis Panel, Acute; Future  -     HIV 1/2 Ag/Ab (4th Gen); Future  -     RPR; Future    Alopecia  -      Ambulatory referral/consult to Internal Medicine    Mixed anxiety and depressive disorder  -     EScitalopram oxalate (LEXAPRO) 10 MG tablet; Take 1 tablet (10 mg total) by mouth every evening.    Screen for colon cancer  -     Ambulatory referral/consult to Endo Procedure ; Future    Vitamin D deficiency  -     Vitamin D; Future    Fatigue, unspecified type  -     TSH; Future  -     Vitamin D; Future  -     Iron and TIBC; Future  -     Folate; Future  -     Ferritin; Future  -     Vitamin B12; Future  -     TESTOSTERONE PANEL; Future    Screen for sexually transmitted diseases  -     C. trachomatis/N. gonorrhoeae by AMP DNA; Future  -     Hepatitis Panel, Acute; Future  -     HIV 1/2 Ag/Ab (4th Gen); Future  -     RPR; Future    Decreased libido  -     TESTOSTERONE PANEL; Future    Encounter for lipid screening for cardiovascular disease  -     Lipid Panel; Future    Physical deconditioning  -     Ambulatory referral/consult to Physical/Occupational Therapy; Future    S/P cervical spinal fusion  -     Ambulatory referral/consult to Physical/Occupational Therapy; Future    Mild intermittent asthma without complication

## 2023-11-13 NOTE — PATIENT INSTRUCTIONS
Discuss oral Minoxidil with derm if topicals not helpful when off Propecia    Look into adding Wellbutrin to your regimen to help with Libido, energy and weight loss    [email or web address]    Dr. Darius Gold    150 min brisk activity per week

## 2023-12-07 ENCOUNTER — HOSPITAL ENCOUNTER (EMERGENCY)
Facility: HOSPITAL | Age: 48
Discharge: HOME | End: 2023-12-07
Attending: EMERGENCY MEDICINE
Payer: COMMERCIAL

## 2023-12-07 VITALS
DIASTOLIC BLOOD PRESSURE: 80 MMHG | SYSTOLIC BLOOD PRESSURE: 124 MMHG | WEIGHT: 180 LBS | RESPIRATION RATE: 16 BRPM | OXYGEN SATURATION: 99 % | HEIGHT: 71 IN | HEART RATE: 78 BPM | TEMPERATURE: 98 F | BODY MASS INDEX: 25.2 KG/M2

## 2023-12-07 DIAGNOSIS — K52.9 GASTROENTERITIS: Primary | ICD-10-CM

## 2023-12-07 DIAGNOSIS — R11.0 NAUSEA: ICD-10-CM

## 2023-12-07 DIAGNOSIS — R10.9 ABDOMINAL PAIN: ICD-10-CM

## 2023-12-07 LAB
ALBUMIN SERPL BCP-MCNC: 4.5 G/DL (ref 3.5–5.2)
ALP SERPL-CCNC: 68 U/L (ref 55–135)
ALT SERPL W/O P-5'-P-CCNC: 54 U/L (ref 10–44)
ANION GAP SERPL CALC-SCNC: 10 MMOL/L (ref 8–16)
AST SERPL-CCNC: 28 U/L (ref 10–40)
BACTERIA #/AREA URNS AUTO: ABNORMAL /HPF
BASOPHILS # BLD AUTO: 0.06 K/UL (ref 0–0.2)
BASOPHILS NFR BLD: 0.5 % (ref 0–1.9)
BILIRUB SERPL-MCNC: 0.8 MG/DL (ref 0.1–1)
BILIRUB UR QL STRIP: NEGATIVE
BUN SERPL-MCNC: 17 MG/DL (ref 6–20)
BUN SERPL-MCNC: 19 MG/DL (ref 6–30)
CALCIUM SERPL-MCNC: 10 MG/DL (ref 8.7–10.5)
CHLORIDE SERPL-SCNC: 102 MMOL/L (ref 95–110)
CHLORIDE SERPL-SCNC: 104 MMOL/L (ref 95–110)
CLARITY UR REFRACT.AUTO: CLEAR
CO2 SERPL-SCNC: 25 MMOL/L (ref 23–29)
COLOR UR AUTO: YELLOW
CREAT SERPL-MCNC: 1.3 MG/DL (ref 0.5–1.4)
CREAT SERPL-MCNC: 1.4 MG/DL (ref 0.5–1.4)
DIFFERENTIAL METHOD: ABNORMAL
EOSINOPHIL # BLD AUTO: 0.1 K/UL (ref 0–0.5)
EOSINOPHIL NFR BLD: 0.9 % (ref 0–8)
ERYTHROCYTE [DISTWIDTH] IN BLOOD BY AUTOMATED COUNT: 12.4 % (ref 11.5–14.5)
EST. GFR  (NO RACE VARIABLE): >60 ML/MIN/1.73 M^2
GLUCOSE SERPL-MCNC: 110 MG/DL (ref 70–110)
GLUCOSE SERPL-MCNC: 120 MG/DL (ref 70–110)
GLUCOSE UR QL STRIP: NEGATIVE
HCT VFR BLD AUTO: 46.6 % (ref 40–54)
HCT VFR BLD CALC: 50 %PCV (ref 36–54)
HCV AB SERPL QL IA: NORMAL
HGB BLD-MCNC: 16.8 G/DL (ref 14–18)
HGB UR QL STRIP: NEGATIVE
HIV 1+2 AB+HIV1 P24 AG SERPL QL IA: NORMAL
HYALINE CASTS UR QL AUTO: 0 /LPF
IMM GRANULOCYTES # BLD AUTO: 0.04 K/UL (ref 0–0.04)
IMM GRANULOCYTES NFR BLD AUTO: 0.3 % (ref 0–0.5)
KETONES UR QL STRIP: ABNORMAL
LEUKOCYTE ESTERASE UR QL STRIP: NEGATIVE
LIPASE SERPL-CCNC: 17 U/L (ref 4–60)
LYMPHOCYTES # BLD AUTO: 2 K/UL (ref 1–4.8)
LYMPHOCYTES NFR BLD: 15.5 % (ref 18–48)
MAGNESIUM SERPL-MCNC: 2 MG/DL (ref 1.6–2.6)
MCH RBC QN AUTO: 28.5 PG (ref 27–31)
MCHC RBC AUTO-ENTMCNC: 36.1 G/DL (ref 32–36)
MCV RBC AUTO: 79 FL (ref 82–98)
MICROSCOPIC COMMENT: ABNORMAL
MONOCYTES # BLD AUTO: 0.8 K/UL (ref 0.3–1)
MONOCYTES NFR BLD: 6.2 % (ref 4–15)
NEUTROPHILS # BLD AUTO: 9.8 K/UL (ref 1.8–7.7)
NEUTROPHILS NFR BLD: 76.6 % (ref 38–73)
NITRITE UR QL STRIP: NEGATIVE
NRBC BLD-RTO: 0 /100 WBC
PH UR STRIP: 6 [PH] (ref 5–8)
PLATELET # BLD AUTO: 316 K/UL (ref 150–450)
PMV BLD AUTO: 9.1 FL (ref 9.2–12.9)
POC IONIZED CALCIUM: 1.21 MMOL/L (ref 1.06–1.42)
POC TCO2 (MEASURED): 28 MMOL/L (ref 23–29)
POTASSIUM BLD-SCNC: 4.3 MMOL/L (ref 3.5–5.1)
POTASSIUM SERPL-SCNC: 4.5 MMOL/L (ref 3.5–5.1)
PROT SERPL-MCNC: 8.4 G/DL (ref 6–8.4)
PROT UR QL STRIP: ABNORMAL
RBC # BLD AUTO: 5.9 M/UL (ref 4.6–6.2)
RBC #/AREA URNS AUTO: 1 /HPF (ref 0–4)
SAMPLE: ABNORMAL
SODIUM BLD-SCNC: 139 MMOL/L (ref 136–145)
SODIUM SERPL-SCNC: 139 MMOL/L (ref 136–145)
SP GR UR STRIP: >1.03 (ref 1–1.03)
URN SPEC COLLECT METH UR: ABNORMAL
WBC # BLD AUTO: 12.75 K/UL (ref 3.9–12.7)
WBC #/AREA URNS AUTO: 2 /HPF (ref 0–5)

## 2023-12-07 PROCEDURE — 81001 URINALYSIS AUTO W/SCOPE: CPT | Performed by: EMERGENCY MEDICINE

## 2023-12-07 PROCEDURE — 85025 COMPLETE CBC W/AUTO DIFF WBC: CPT | Performed by: EMERGENCY MEDICINE

## 2023-12-07 PROCEDURE — 93010 ELECTROCARDIOGRAM REPORT: CPT | Mod: ,,, | Performed by: INTERNAL MEDICINE

## 2023-12-07 PROCEDURE — 83690 ASSAY OF LIPASE: CPT | Performed by: EMERGENCY MEDICINE

## 2023-12-07 PROCEDURE — 93005 ELECTROCARDIOGRAM TRACING: CPT

## 2023-12-07 PROCEDURE — 87389 HIV-1 AG W/HIV-1&-2 AB AG IA: CPT | Performed by: PHYSICIAN ASSISTANT

## 2023-12-07 PROCEDURE — 25000003 PHARM REV CODE 250: Performed by: EMERGENCY MEDICINE

## 2023-12-07 PROCEDURE — 80053 COMPREHEN METABOLIC PANEL: CPT | Performed by: EMERGENCY MEDICINE

## 2023-12-07 PROCEDURE — 96375 TX/PRO/DX INJ NEW DRUG ADDON: CPT

## 2023-12-07 PROCEDURE — 96361 HYDRATE IV INFUSION ADD-ON: CPT

## 2023-12-07 PROCEDURE — 83735 ASSAY OF MAGNESIUM: CPT | Performed by: EMERGENCY MEDICINE

## 2023-12-07 PROCEDURE — 99284 EMERGENCY DEPT VISIT MOD MDM: CPT | Mod: 25

## 2023-12-07 PROCEDURE — 86803 HEPATITIS C AB TEST: CPT | Performed by: PHYSICIAN ASSISTANT

## 2023-12-07 PROCEDURE — 63600175 PHARM REV CODE 636 W HCPCS: Performed by: EMERGENCY MEDICINE

## 2023-12-07 PROCEDURE — 96374 THER/PROPH/DIAG INJ IV PUSH: CPT

## 2023-12-07 PROCEDURE — 93010 EKG 12-LEAD: ICD-10-PCS | Mod: ,,, | Performed by: INTERNAL MEDICINE

## 2023-12-07 RX ORDER — PROMETHAZINE HYDROCHLORIDE 25 MG/1
25 SUPPOSITORY RECTAL EVERY 6 HOURS PRN
Qty: 12 SUPPOSITORY | Refills: 0 | Status: SHIPPED | OUTPATIENT
Start: 2023-12-07

## 2023-12-07 RX ORDER — ONDANSETRON 2 MG/ML
4 INJECTION INTRAMUSCULAR; INTRAVENOUS
Status: COMPLETED | OUTPATIENT
Start: 2023-12-07 | End: 2023-12-07

## 2023-12-07 RX ORDER — ONDANSETRON 4 MG/1
4 TABLET, ORALLY DISINTEGRATING ORAL EVERY 6 HOURS PRN
Qty: 10 TABLET | Refills: 0 | Status: SHIPPED | OUTPATIENT
Start: 2023-12-07 | End: 2023-12-07

## 2023-12-07 RX ORDER — KETOROLAC TROMETHAMINE 30 MG/ML
10 INJECTION, SOLUTION INTRAMUSCULAR; INTRAVENOUS
Status: COMPLETED | OUTPATIENT
Start: 2023-12-07 | End: 2023-12-07

## 2023-12-07 RX ADMIN — KETOROLAC TROMETHAMINE 10 MG: 30 INJECTION, SOLUTION INTRAMUSCULAR; INTRAVENOUS at 11:12

## 2023-12-07 RX ADMIN — SODIUM CHLORIDE 1000 ML: 9 INJECTION, SOLUTION INTRAVENOUS at 11:12

## 2023-12-07 RX ADMIN — ONDANSETRON 4 MG: 2 INJECTION INTRAMUSCULAR; INTRAVENOUS at 11:12

## 2023-12-07 NOTE — ED NOTES
I-STAT Chem-8+ Results:   Value Reference Range   Sodium 139 136-145 mmol/L   Potassium  4.3 3.5-5.1 mmol/L   Chloride 102  mmol/L   Ionized Calcium 1.21 1.06-1.42 mmol/L   CO2 (measured) 28 23-29 mmol/L   Glucose 120  mg/dL   BUN 19 6-30 mg/dL   Creatinine 1.3 0.5-1.4 mg/dL   Hematocrit 50 36-54%

## 2023-12-07 NOTE — ED NOTES
LOC: The patient is awake, alert and aware of environment with an appropriate affect, the patient is oriented x 3 and speaking appropriately.   APPEARANCE: Patient appears comfortable and in no acute distress, patient is clean and well groomed.  SKIN: The skin is warm and dry, color consistent with ethnicity, patient has normal skin turgor and moist mucus membranes, skin intact, no breakdown or bruising noted.   MUSCULOSKELETAL: Patient moving all extremities spontaneously, no swelling noted.  RESPIRATORY: Airway is open and patent, respirations are spontaneous, patient has a normal effort and rate, no accessory muscle use noted.  CARDIAC: Patient has a normal rate and regular rhythm, no edema noted, capillary refill < 3 seconds.   GASTRO: + nausea, + vomiting, + diarrhea  : Pt denies any pain or frequency with urination.  NEURO: Pt opens eyes spontaneously, behavior appropriate to situation, follows commands, facial expression symmetrical, bilateral hand grasp equal and even, purposeful motor response noted, normal sensation in all extremities when touched with a finger.

## 2023-12-07 NOTE — ED PROVIDER NOTES
Chief Complaint   Abdominal Pain (Nvd started on tues, mom has same symptoms at home)      History Of Present Illness   Josef Sage is a 48 y.o. male presenting with nausea, vomiting and diarrhea that started 2 days ago.  He also notes some left upper quadrant abdominal pain.  He states he has been unable to keep anything down.  The diarrhea is watery and brown; he has lost count of the number of episodes.  His mom had food poisoning a couple of days ago with similar symptoms.  No fever or chills.  No blood in the emesis or diarrhea.  He recently started semaglutide, last injection was 6 days ago.       Review of patient's allergies indicates:   Allergen Reactions    Sulfa (sulfonamide antibiotics) Rash       No current facility-administered medications on file prior to encounter.     Current Outpatient Medications on File Prior to Encounter   Medication Sig Dispense Refill    albuterol (PROVENTIL/VENTOLIN HFA) 90 mcg/actuation inhaler Inhale 1-2 puffs into the lungs every 6 (six) hours as needed for Wheezing. 6.7 g 1    dicyclomine (BENTYL) 10 MG capsule Take 10 mg by mouth 3 (three) times daily as needed.      EScitalopram oxalate (LEXAPRO) 10 MG tablet Take 1 tablet (10 mg total) by mouth every evening. 90 tablet 3    fluticasone propionate (FLONASE) 50 mcg/actuation nasal spray 2 sprays (100 mcg total) by Each Nostril route 2 (two) times daily. 16 g 11    multivitamin capsule Take 1 capsule by mouth once daily.      PROPECIA 1 mg tablet Take 1 tablet (1 mg total) by mouth once daily. 30 tablet 2    [DISCONTINUED] ondansetron (ZOFRAN-ODT) 8 MG TbDL Dissolve 1 tablet (8 mg total) by mouth every 6 (six) hours as needed (nausea). 28 tablet 0       Past History   As per HPI and below:  Past Medical History:   Diagnosis Date    Anxiety     Asthma     Bronchitis     Cervical spondylosis with radiculopathy 05/10/2023    Mixed hyperlipidemia 09/17/2019    Recurrent upper respiratory infection (URI)     Wheezes      Past  "Surgical History:   Procedure Laterality Date    ANTERIOR CERVICAL DISCECTOMY W/ FUSION N/A 05/10/2023    Procedure: C5-6 ACDF;  Surgeon: Ivan Dalton MD;  Location: [facility] OR [address] [place] [address];  Service: Neurosurgery;  Laterality: N/A;  C5-6 ACDF  anesthesia: general  nerve mon: EMG,SEP, MEP  radiology; C-ARM  bed: regular slider  headrest: caspar, GW tongs/hanging weights/ surgical pillow  misc; interbody paddle distractor, nerve root retractor (depuy)    MOUTH SURGERY      wisdom teeth    None         Social History     Socioeconomic History    Marital status: Single    Number of children: 0   Occupational History    Occupation: Internal medicine physician      Comment: Academics   Tobacco Use    Smoking status: Never    Smokeless tobacco: Never   Substance and Sexual Activity    Alcohol use: Not Currently     Alcohol/week: 1.0 standard drink of alcohol     Types: 1 Glasses of wine per week    Drug use: No     Comment: CBD gummies    Sexual activity: Yes     Partners: Female     Comment: Not    Social History Narrative    Finance     Social Determinants of Health     Stress: No Stress Concern Present (12/23/2020)    [facility] [facility] - Occupational Stress Questionnaire     Feeling of Stress : Not at all       Family History   Problem Relation Age of Onset    Hypertension Mother     Hypertension Father     Eczema Sister     Cancer Maternal Cousin         colon    Heart disease Neg Hx     Hyperlipidemia Neg Hx     Diabetes Neg Hx        Physical Exam     Vitals:    12/07/23 0920 12/07/23 1110 12/07/23 1425   BP: 112/77 122/84 124/80   Pulse: 90 84 78   Resp: 16 18 16   Temp: 98.7 °F (37.1 °C) 98.2 °F (36.8 °C) 98.3 °F (36.8 °C)   TempSrc: Oral Oral    SpO2: 96% 99% 99%   Weight: 81.6 kg (180 lb)     Height: 5' 11" (1.803 m)       Appearance: No acute distress.  Skin: No rashes seen.  Good turgor.  No abrasions.  No ecchymoses.  Eyes: No conjunctival injection.  ENT: Oropharynx clear.    Chest: " Clear to auscultation bilaterally.  Good air movement.  No wheezes.  No rhonchi.  Cardiovascular: Regular rate and rhythm.  No murmurs. No gallops. No rubs.  Abdomen: Soft.  Not distended.  LUQ TTP.   No guarding.  No rebound.  Musculoskeletal: Good range of motion all joints.  No deformities.  Neck supple.  No meningismus.  Neurologic: Motor intact.  Sensation intact.  Cerebellar intact.  Cranial nerves intact.  Mental Status:  Alert and oriented x 3.  Appropriate, conversant.      Initial MDM   Nausea, vomiting and diarrhea for 2 days.  Close contact with similar symptoms, although attributed to food poisoning.  They did not eat the same food.  No fever or chills.  Some left upper quadrant tenderness without guarding or rebound.  Given all the symptoms and location, I do not think a CT scan is necessarily indicated at this time.  I am going to obtain labs, treat symptoms, give fluids and reassess.  ]    Medications Given     Medications   sodium chloride 0.9% bolus 1,000 mL 1,000 mL (0 mLs Intravenous Stopped 12/7/23 1326)   ketorolac injection 9.999 mg (9.999 mg Intravenous Given 12/7/23 1140)   ondansetron injection 4 mg (4 mg Intravenous Given 12/7/23 1140)       Results and Course     Labs Reviewed   CBC W/ AUTO DIFFERENTIAL - Abnormal; Notable for the following components:       Result Value    WBC 12.75 (*)     MCV 79 (*)     MCHC 36.1 (*)     MPV 9.1 (*)     Gran # (ANC) 9.8 (*)     Gran % 76.6 (*)     Lymph % 15.5 (*)     All other components within normal limits    Narrative:     Release to patient->Immediate   COMPREHENSIVE METABOLIC PANEL - Abnormal; Notable for the following components:    ALT 54 (*)     All other components within normal limits    Narrative:     Release to patient->Immediate   URINALYSIS, REFLEX TO URINE CULTURE - Abnormal; Notable for the following components:    Specific Gravity, UA >1.030 (*)     Protein, UA 1+ (*)     Ketones, UA Trace (*)     All other components within normal  limits    Narrative:     Specimen Source->Urine   URINALYSIS MICROSCOPIC - Abnormal; Notable for the following components:    Bacteria Few (*)     All other components within normal limits    Narrative:     Specimen Source->Urine   ISTAT PROCEDURE - Abnormal; Notable for the following components:    POC Glucose 120 (*)     All other components within normal limits   HIV 1 / 2 ANTIBODY    Narrative:     Release to patient->Immediate   HEPATITIS C ANTIBODY    Narrative:     Release to patient->Immediate   LIPASE    Narrative:     Release to patient->Immediate   MAGNESIUM    Narrative:     Release to patient->Immediate   ISTAT CHEM8       Imaging Results    None         ED Course as of 12/07/23 1428   Thu Dec 07, 2023   1128 WBC(!): 12.75 [DC]   1128 Hemoglobin: 16.8 [DC]   1128 Platelet Count: 316 [DC]   1128 POC Creatinine: 1.3 [DC]   1151 Magnesium : 2.0 [DC]   1151 Lipase: 17 [DC]   1151 Creatinine: 1.4 [DC]   1202 Hepatitis C Ab: Non-reactive [DC]   1202 HIV 1/2 Ag/Ab: Non-reactive [DC]   1323 Tolerating PO.   [DC]   1418 WBC, UA: 2 [DC]   1418 RBC, UA: 1 [DC]   1418 Occult Blood UA: Negative [DC]   1418 NITRITE UA: Negative [DC]   1418 Leukocytes, UA: Negative [DC]      ED Course User Index  [DC] Sharan Magallanes MD               MDM, Impression and Plan   48 y.o. male with nausea, vomiting and diarrhea for a couple of days along with some left flank pain.  He recently took an injection of semaglutide, which might explain his symptoms.  Zofran was not helping at home.  After fluids and Zofran here, his symptoms have resolved.  He is tolerating p.o..  His labs are benign except for some hints of dehydration in the urine.  I will send him home with Phenergan suppositories as the Zofran was not working.  Counseled him on strategies for small volume, frequent fluid rehydration..  On repeat exam, tenderness is more localized to the flank and has no abdominal tenderness in the front.  Thus, doubt  colitis/diverticulitis.  I do not think he would benefit from CT scan.  His urine does not suggest infection or stone.       Final diagnoses:  [R10.9] Abdominal pain  [K52.9] Gastroenteritis (Primary)        ED Disposition Condition    Discharge Stable          ED Prescriptions       Medication Sig Dispense Start Date End Date Auth. Provider    ondansetron (ZOFRAN-ODT) 4 MG TbDL  (Status: Discontinued) Take 1 tablet (4 mg total) by mouth every 6 (six) hours as needed (nausea or vomiting). 10 tablet 12/7/2023 12/7/2023 Sharan Magallanes MD    promethazine (PHENERGAN) 25 MG suppository Place 1 suppository (25 mg total) rectally every 6 (six) hours as needed for Nausea. 12 suppository 12/7/2023 -- Sharan Magallanes MD          Follow-up Information       Follow up With Specialties Details Why Contact Info    Ambreen Prather MD Family Medicine In 3 days  [address]  [place] [address]  [phone]                 Sharan Magallanes MD  12/07/23 6597

## 2023-12-20 ENCOUNTER — PATIENT MESSAGE (OUTPATIENT)
Dept: PRIMARY CARE CLINIC | Facility: CLINIC | Age: 48
End: 2023-12-20
Payer: COMMERCIAL

## 2024-01-05 ENCOUNTER — LAB VISIT (OUTPATIENT)
Dept: LAB | Facility: HOSPITAL | Age: 49
End: 2024-01-05
Payer: COMMERCIAL

## 2024-01-05 DIAGNOSIS — Z13.220 ENCOUNTER FOR LIPID SCREENING FOR CARDIOVASCULAR DISEASE: ICD-10-CM

## 2024-01-05 DIAGNOSIS — R68.82 DECREASED LIBIDO: ICD-10-CM

## 2024-01-05 DIAGNOSIS — E55.9 VITAMIN D DEFICIENCY: ICD-10-CM

## 2024-01-05 DIAGNOSIS — R53.83 FATIGUE, UNSPECIFIED TYPE: ICD-10-CM

## 2024-01-05 DIAGNOSIS — Z11.3 SCREEN FOR SEXUALLY TRANSMITTED DISEASES: ICD-10-CM

## 2024-01-05 DIAGNOSIS — Z13.6 ENCOUNTER FOR LIPID SCREENING FOR CARDIOVASCULAR DISEASE: ICD-10-CM

## 2024-01-05 DIAGNOSIS — Z00.00 ANNUAL PHYSICAL EXAM: ICD-10-CM

## 2024-01-05 LAB
25(OH)D3+25(OH)D2 SERPL-MCNC: 22 NG/ML (ref 30–96)
ALBUMIN SERPL BCP-MCNC: 4.1 G/DL (ref 3.5–5.2)
ALP SERPL-CCNC: 59 U/L (ref 55–135)
ALT SERPL W/O P-5'-P-CCNC: 39 U/L (ref 10–44)
ANION GAP SERPL CALC-SCNC: 11 MMOL/L (ref 8–16)
AST SERPL-CCNC: 25 U/L (ref 10–40)
BASOPHILS # BLD AUTO: 0.04 K/UL (ref 0–0.2)
BASOPHILS NFR BLD: 0.5 % (ref 0–1.9)
BILIRUB SERPL-MCNC: 0.5 MG/DL (ref 0.1–1)
BUN SERPL-MCNC: 13 MG/DL (ref 6–20)
CALCIUM SERPL-MCNC: 9.3 MG/DL (ref 8.7–10.5)
CHLORIDE SERPL-SCNC: 105 MMOL/L (ref 95–110)
CHOLEST SERPL-MCNC: 257 MG/DL (ref 120–199)
CHOLEST/HDLC SERPL: 7.8 {RATIO} (ref 2–5)
CO2 SERPL-SCNC: 23 MMOL/L (ref 23–29)
CREAT SERPL-MCNC: 1.2 MG/DL (ref 0.5–1.4)
DIFFERENTIAL METHOD BLD: NORMAL
EOSINOPHIL # BLD AUTO: 0.2 K/UL (ref 0–0.5)
EOSINOPHIL NFR BLD: 2 % (ref 0–8)
ERYTHROCYTE [DISTWIDTH] IN BLOOD BY AUTOMATED COUNT: 12.3 % (ref 11.5–14.5)
EST. GFR  (NO RACE VARIABLE): >60 ML/MIN/1.73 M^2
ESTIMATED AVG GLUCOSE: 97 MG/DL (ref 68–131)
FERRITIN SERPL-MCNC: 188 NG/ML (ref 20–300)
FOLATE SERPL-MCNC: 16.9 NG/ML (ref 4–24)
GLUCOSE SERPL-MCNC: 101 MG/DL (ref 70–110)
HAV IGM SERPL QL IA: NORMAL
HBA1C MFR BLD: 5 % (ref 4–5.6)
HBV CORE IGM SERPL QL IA: NORMAL
HBV SURFACE AG SERPL QL IA: NORMAL
HCT VFR BLD AUTO: 45.8 % (ref 40–54)
HCV AB SERPL QL IA: NORMAL
HDLC SERPL-MCNC: 33 MG/DL (ref 40–75)
HDLC SERPL: 12.8 % (ref 20–50)
HGB BLD-MCNC: 15.3 G/DL (ref 14–18)
HIV 1+2 AB+HIV1 P24 AG SERPL QL IA: NORMAL
IMM GRANULOCYTES # BLD AUTO: 0.02 K/UL (ref 0–0.04)
IMM GRANULOCYTES NFR BLD AUTO: 0.3 % (ref 0–0.5)
IRON SERPL-MCNC: 61 UG/DL (ref 45–160)
LDLC SERPL CALC-MCNC: 189.2 MG/DL (ref 63–159)
LYMPHOCYTES # BLD AUTO: 2.1 K/UL (ref 1–4.8)
LYMPHOCYTES NFR BLD: 28.3 % (ref 18–48)
MCH RBC QN AUTO: 28.2 PG (ref 27–31)
MCHC RBC AUTO-ENTMCNC: 33.4 G/DL (ref 32–36)
MCV RBC AUTO: 85 FL (ref 82–98)
MONOCYTES # BLD AUTO: 0.5 K/UL (ref 0.3–1)
MONOCYTES NFR BLD: 6.3 % (ref 4–15)
NEUTROPHILS # BLD AUTO: 4.7 K/UL (ref 1.8–7.7)
NEUTROPHILS NFR BLD: 62.6 % (ref 38–73)
NONHDLC SERPL-MCNC: 224 MG/DL
NRBC BLD-RTO: 0 /100 WBC
PLATELET # BLD AUTO: 318 K/UL (ref 150–450)
PMV BLD AUTO: 9.8 FL (ref 9.2–12.9)
POTASSIUM SERPL-SCNC: 4.5 MMOL/L (ref 3.5–5.1)
PROT SERPL-MCNC: 7.5 G/DL (ref 6–8.4)
RBC # BLD AUTO: 5.42 M/UL (ref 4.6–6.2)
SATURATED IRON: 18 % (ref 20–50)
SODIUM SERPL-SCNC: 139 MMOL/L (ref 136–145)
TOTAL IRON BINDING CAPACITY: 340 UG/DL (ref 250–450)
TRANSFERRIN SERPL-MCNC: 230 MG/DL (ref 200–375)
TRIGL SERPL-MCNC: 174 MG/DL (ref 30–150)
TSH SERPL DL<=0.005 MIU/L-ACNC: 1.37 UIU/ML (ref 0.4–4)
VIT B12 SERPL-MCNC: 1985 PG/ML (ref 210–950)
WBC # BLD AUTO: 7.52 K/UL (ref 3.9–12.7)

## 2024-01-05 PROCEDURE — 82607 VITAMIN B-12: CPT | Performed by: FAMILY MEDICINE

## 2024-01-05 PROCEDURE — 86592 SYPHILIS TEST NON-TREP QUAL: CPT | Performed by: FAMILY MEDICINE

## 2024-01-05 PROCEDURE — 82040 ASSAY OF SERUM ALBUMIN: CPT | Performed by: FAMILY MEDICINE

## 2024-01-05 PROCEDURE — 80061 LIPID PANEL: CPT | Performed by: FAMILY MEDICINE

## 2024-01-05 PROCEDURE — 82306 VITAMIN D 25 HYDROXY: CPT | Performed by: FAMILY MEDICINE

## 2024-01-05 PROCEDURE — 82728 ASSAY OF FERRITIN: CPT | Performed by: FAMILY MEDICINE

## 2024-01-05 PROCEDURE — 83540 ASSAY OF IRON: CPT | Performed by: FAMILY MEDICINE

## 2024-01-05 PROCEDURE — 85025 COMPLETE CBC W/AUTO DIFF WBC: CPT | Performed by: FAMILY MEDICINE

## 2024-01-05 PROCEDURE — 87389 HIV-1 AG W/HIV-1&-2 AB AG IA: CPT | Performed by: FAMILY MEDICINE

## 2024-01-05 PROCEDURE — 83036 HEMOGLOBIN GLYCOSYLATED A1C: CPT | Performed by: FAMILY MEDICINE

## 2024-01-05 PROCEDURE — 82746 ASSAY OF FOLIC ACID SERUM: CPT | Performed by: FAMILY MEDICINE

## 2024-01-05 PROCEDURE — 80053 COMPREHEN METABOLIC PANEL: CPT | Performed by: FAMILY MEDICINE

## 2024-01-05 PROCEDURE — 36415 COLL VENOUS BLD VENIPUNCTURE: CPT | Performed by: FAMILY MEDICINE

## 2024-01-05 PROCEDURE — 80074 ACUTE HEPATITIS PANEL: CPT | Performed by: FAMILY MEDICINE

## 2024-01-05 PROCEDURE — 84443 ASSAY THYROID STIM HORMONE: CPT | Performed by: FAMILY MEDICINE

## 2024-01-06 LAB — RPR SER QL: NORMAL

## 2024-01-11 ENCOUNTER — TELEPHONE (OUTPATIENT)
Dept: PRIMARY CARE CLINIC | Facility: CLINIC | Age: 49
End: 2024-01-11

## 2024-01-11 DIAGNOSIS — E78.5 HYPERLIPIDEMIA, UNSPECIFIED HYPERLIPIDEMIA TYPE: Primary | ICD-10-CM

## 2024-01-11 DIAGNOSIS — E78.6 LOW HDL (UNDER 40): ICD-10-CM

## 2024-01-11 RX ORDER — ROSUVASTATIN CALCIUM 5 MG/1
5 TABLET, COATED ORAL DAILY
Qty: 90 TABLET | Refills: 3 | Status: SHIPPED | OUTPATIENT
Start: 2024-01-11 | End: 2024-06-03

## 2024-01-12 ENCOUNTER — PATIENT MESSAGE (OUTPATIENT)
Dept: PRIMARY CARE CLINIC | Facility: CLINIC | Age: 49
End: 2024-01-12
Payer: COMMERCIAL

## 2024-01-15 LAB
ALBUMIN SERPL-MCNC: 4.5 G/DL (ref 3.6–5.1)
SHBG SERPL-SCNC: 19 NMOL/L (ref 10–50)
TESTOST FREE SERPL-MCNC: 52.1 PG/ML (ref 46–224)
TESTOST SERPL-MCNC: 275 NG/DL (ref 250–1100)
TESTOSTERONE.FREE+WB SERPL-MCNC: 107.2 NG/DL

## 2024-01-16 ENCOUNTER — TELEPHONE (OUTPATIENT)
Dept: PRIMARY CARE CLINIC | Facility: CLINIC | Age: 49
End: 2024-01-16
Payer: COMMERCIAL

## 2024-01-16 NOTE — TELEPHONE ENCOUNTER
----- Message from Ciara Chandler LPN sent at 1/16/2024 12:41 PM CST -----    ----- Message -----  From: Ambreen Prather MD  Sent: 1/11/2024  12:38 PM CST  To: [facility] Staff    Your cholesterol is much higher than I would like, your triglycerides are above goal, your good cholesterol, the HDL is too low and your bad cholesterol, the LDL is too high.  I recommend starting on a medication like Crestor to help improve your lipids.  We can repeat blood work 3 months after starting the medication to make sure it is working.  Additionally, following a Mediterranean style diet and doing 150 minute of brisk activity per week can help.  Your vitamin-D is really low, please take over-the-counter D3 2000 IU daily, this will be long-term.  The rest of your blood work looks good.  I will have my staff reach out to help set up your repeat lipid panel.

## 2024-03-05 ENCOUNTER — LAB VISIT (OUTPATIENT)
Dept: LAB | Facility: HOSPITAL | Age: 49
End: 2024-03-05
Attending: FAMILY MEDICINE
Payer: COMMERCIAL

## 2024-03-05 DIAGNOSIS — Z79.899 ENCOUNTER FOR LONG-TERM (CURRENT) USE OF OTHER MEDICATIONS: ICD-10-CM

## 2024-03-05 DIAGNOSIS — L70.0 NODULAR ELASTOSIS WITH CYSTS AND COMEDONES OF FAVRE AND RACOUCHOT: ICD-10-CM

## 2024-03-05 DIAGNOSIS — L57.8 NODULAR ELASTOSIS WITH CYSTS AND COMEDONES OF FAVRE AND RACOUCHOT: ICD-10-CM

## 2024-03-05 LAB
ALBUMIN SERPL BCP-MCNC: 4.1 G/DL (ref 3.5–5.2)
ALP SERPL-CCNC: 50 U/L (ref 55–135)
ALT SERPL W/O P-5'-P-CCNC: 29 U/L (ref 10–44)
ANION GAP SERPL CALC-SCNC: 12 MMOL/L (ref 8–16)
AST SERPL-CCNC: 22 U/L (ref 10–40)
BILIRUB SERPL-MCNC: 0.6 MG/DL (ref 0.1–1)
BUN SERPL-MCNC: 16 MG/DL (ref 6–20)
CALCIUM SERPL-MCNC: 9.3 MG/DL (ref 8.7–10.5)
CHLORIDE SERPL-SCNC: 106 MMOL/L (ref 95–110)
CHOLEST SERPL-MCNC: 244 MG/DL (ref 120–199)
CHOLEST/HDLC SERPL: 6.4 {RATIO} (ref 2–5)
CO2 SERPL-SCNC: 21 MMOL/L (ref 23–29)
CREAT SERPL-MCNC: 1.1 MG/DL (ref 0.5–1.4)
EST. GFR  (NO RACE VARIABLE): >60 ML/MIN/1.73 M^2
GLUCOSE SERPL-MCNC: 97 MG/DL (ref 70–110)
HDLC SERPL-MCNC: 38 MG/DL (ref 40–75)
HDLC SERPL: 15.6 % (ref 20–50)
LDLC SERPL CALC-MCNC: 179.4 MG/DL (ref 63–159)
NONHDLC SERPL-MCNC: 206 MG/DL
POTASSIUM SERPL-SCNC: 4 MMOL/L (ref 3.5–5.1)
PROT SERPL-MCNC: 7.3 G/DL (ref 6–8.4)
SODIUM SERPL-SCNC: 139 MMOL/L (ref 136–145)
TRIGL SERPL-MCNC: 133 MG/DL (ref 30–150)

## 2024-03-05 PROCEDURE — 36415 COLL VENOUS BLD VENIPUNCTURE: CPT | Performed by: DERMATOLOGY

## 2024-03-05 PROCEDURE — 80061 LIPID PANEL: CPT | Performed by: DERMATOLOGY

## 2024-03-05 PROCEDURE — 80053 COMPREHEN METABOLIC PANEL: CPT | Performed by: DERMATOLOGY

## 2024-05-09 ENCOUNTER — PATIENT MESSAGE (OUTPATIENT)
Dept: ADMINISTRATIVE | Facility: OTHER | Age: 49
End: 2024-05-09
Payer: COMMERCIAL

## 2024-05-24 ENCOUNTER — LAB VISIT (OUTPATIENT)
Dept: LAB | Facility: HOSPITAL | Age: 49
End: 2024-05-24
Attending: FAMILY MEDICINE
Payer: COMMERCIAL

## 2024-05-24 DIAGNOSIS — E78.6 LOW HDL (UNDER 40): ICD-10-CM

## 2024-05-24 DIAGNOSIS — E78.5 HYPERLIPIDEMIA, UNSPECIFIED HYPERLIPIDEMIA TYPE: ICD-10-CM

## 2024-05-24 LAB
CHOLEST SERPL-MCNC: 259 MG/DL (ref 120–199)
CHOLEST/HDLC SERPL: 6.2 {RATIO} (ref 2–5)
HDLC SERPL-MCNC: 42 MG/DL (ref 40–75)
HDLC SERPL: 16.2 % (ref 20–50)
LDLC SERPL CALC-MCNC: 192 MG/DL (ref 63–159)
NONHDLC SERPL-MCNC: 217 MG/DL
TRIGL SERPL-MCNC: 125 MG/DL (ref 30–150)

## 2024-05-24 PROCEDURE — 36415 COLL VENOUS BLD VENIPUNCTURE: CPT | Performed by: FAMILY MEDICINE

## 2024-05-24 PROCEDURE — 80061 LIPID PANEL: CPT | Performed by: FAMILY MEDICINE

## 2024-05-28 ENCOUNTER — LAB VISIT (OUTPATIENT)
Dept: LAB | Facility: HOSPITAL | Age: 49
End: 2024-05-28
Attending: DERMATOLOGY
Payer: COMMERCIAL

## 2024-05-28 DIAGNOSIS — Z79.899 ENCOUNTER FOR LONG-TERM (CURRENT) USE OF OTHER MEDICATIONS: ICD-10-CM

## 2024-05-28 DIAGNOSIS — L70.0 NODULAR ELASTOSIS WITH CYSTS AND COMEDONES OF FAVRE AND RACOUCHOT: Primary | ICD-10-CM

## 2024-05-28 DIAGNOSIS — E55.9 AVITAMINOSIS D: ICD-10-CM

## 2024-05-28 DIAGNOSIS — L57.8 NODULAR ELASTOSIS WITH CYSTS AND COMEDONES OF FAVRE AND RACOUCHOT: Primary | ICD-10-CM

## 2024-05-28 LAB
ALBUMIN SERPL BCP-MCNC: 4.1 G/DL (ref 3.5–5.2)
ALP SERPL-CCNC: 66 U/L (ref 55–135)
ALT SERPL W/O P-5'-P-CCNC: 30 U/L (ref 10–44)
ANION GAP SERPL CALC-SCNC: 7 MMOL/L (ref 8–16)
AST SERPL-CCNC: 31 U/L (ref 10–40)
BILIRUB SERPL-MCNC: 0.4 MG/DL (ref 0.1–1)
BUN SERPL-MCNC: 14 MG/DL (ref 6–20)
CALCIUM SERPL-MCNC: 9.7 MG/DL (ref 8.7–10.5)
CHLORIDE SERPL-SCNC: 106 MMOL/L (ref 95–110)
CO2 SERPL-SCNC: 26 MMOL/L (ref 23–29)
CREAT SERPL-MCNC: 1 MG/DL (ref 0.5–1.4)
EST. GFR  (NO RACE VARIABLE): >60 ML/MIN/1.73 M^2
GLUCOSE SERPL-MCNC: 94 MG/DL (ref 70–110)
POTASSIUM SERPL-SCNC: 5 MMOL/L (ref 3.5–5.1)
PROT SERPL-MCNC: 7.8 G/DL (ref 6–8.4)
SODIUM SERPL-SCNC: 139 MMOL/L (ref 136–145)

## 2024-05-28 PROCEDURE — 80053 COMPREHEN METABOLIC PANEL: CPT | Performed by: DERMATOLOGY

## 2024-05-28 PROCEDURE — 36415 COLL VENOUS BLD VENIPUNCTURE: CPT | Mod: PN | Performed by: DERMATOLOGY

## 2024-05-29 ENCOUNTER — TELEPHONE (OUTPATIENT)
Dept: PRIMARY CARE CLINIC | Facility: CLINIC | Age: 49
End: 2024-05-29
Payer: COMMERCIAL

## 2024-05-29 NOTE — TELEPHONE ENCOUNTER
----- Message from Ambreen Prather MD sent at 5/28/2024  4:34 PM CDT -----  Cholesterol is higher than our last visit, I recommend we increase your Crestor to 20 mg daily and repeat your labs in 8-12 weeks

## 2024-06-01 ENCOUNTER — PATIENT MESSAGE (OUTPATIENT)
Dept: PRIMARY CARE CLINIC | Facility: CLINIC | Age: 49
End: 2024-06-01
Payer: COMMERCIAL

## 2024-06-01 DIAGNOSIS — E78.5 HYPERLIPIDEMIA, UNSPECIFIED HYPERLIPIDEMIA TYPE: Primary | ICD-10-CM

## 2024-06-01 DIAGNOSIS — E78.6 LOW HDL (UNDER 40): ICD-10-CM

## 2024-06-01 DIAGNOSIS — E55.9 VITAMIN D DEFICIENCY: ICD-10-CM

## 2024-06-03 RX ORDER — ROSUVASTATIN CALCIUM 20 MG/1
20 TABLET, COATED ORAL DAILY
Qty: 90 TABLET | Refills: 3 | Status: SHIPPED | OUTPATIENT
Start: 2024-06-03 | End: 2025-06-03

## 2024-06-03 NOTE — TELEPHONE ENCOUNTER
Pt requesting lab order for vit D, unable to pend this lab. Pt states he wants to see if his supplements have improved his level from the 1/5/24 lab draw

## 2024-06-05 ENCOUNTER — LAB VISIT (OUTPATIENT)
Dept: LAB | Facility: HOSPITAL | Age: 49
End: 2024-06-05
Attending: FAMILY MEDICINE
Payer: COMMERCIAL

## 2024-06-05 ENCOUNTER — TELEPHONE (OUTPATIENT)
Dept: PRIMARY CARE CLINIC | Facility: CLINIC | Age: 49
End: 2024-06-05
Payer: COMMERCIAL

## 2024-06-05 DIAGNOSIS — E55.9 VITAMIN D DEFICIENCY: Primary | ICD-10-CM

## 2024-06-05 DIAGNOSIS — R10.9 ABDOMINAL PAIN, UNSPECIFIED ABDOMINAL LOCATION: ICD-10-CM

## 2024-06-05 DIAGNOSIS — E78.6 LOW HDL (UNDER 40): ICD-10-CM

## 2024-06-05 DIAGNOSIS — E78.5 HYPERLIPIDEMIA, UNSPECIFIED HYPERLIPIDEMIA TYPE: ICD-10-CM

## 2024-06-05 DIAGNOSIS — E55.9 VITAMIN D DEFICIENCY: ICD-10-CM

## 2024-06-05 LAB
25(OH)D3+25(OH)D2 SERPL-MCNC: 30 NG/ML (ref 30–96)
CHOLEST SERPL-MCNC: 252 MG/DL (ref 120–199)
CHOLEST/HDLC SERPL: 6.1 {RATIO} (ref 2–5)
HDLC SERPL-MCNC: 41 MG/DL (ref 40–75)
HDLC SERPL: 16.3 % (ref 20–50)
LDLC SERPL CALC-MCNC: 187.2 MG/DL (ref 63–159)
NONHDLC SERPL-MCNC: 211 MG/DL
TRIGL SERPL-MCNC: 119 MG/DL (ref 30–150)

## 2024-06-05 PROCEDURE — 80061 LIPID PANEL: CPT | Performed by: FAMILY MEDICINE

## 2024-06-05 PROCEDURE — 82306 VITAMIN D 25 HYDROXY: CPT | Performed by: FAMILY MEDICINE

## 2024-06-06 ENCOUNTER — TELEPHONE (OUTPATIENT)
Dept: PRIMARY CARE CLINIC | Facility: CLINIC | Age: 49
End: 2024-06-06
Payer: COMMERCIAL

## 2024-06-06 ENCOUNTER — PATIENT MESSAGE (OUTPATIENT)
Dept: PRIMARY CARE CLINIC | Facility: CLINIC | Age: 49
End: 2024-06-06
Payer: COMMERCIAL

## 2024-06-06 NOTE — TELEPHONE ENCOUNTER
----- Message from Oneyda Bernard MA sent at 6/6/2024  3:34 PM CDT -----  Contact: [phone]    ----- Message -----  From: Soumya Hernandez  Sent: 6/6/2024   3:34 PM CDT  To: [facility] Staff    Patient is returning a phone call.  Who left a message for the patient: Ciara  Does patient know what this is regarding:  Vit D  Would you like a call back, or a response through your [facility] portal?:   call back   Comments:

## 2024-06-06 NOTE — TELEPHONE ENCOUNTER
Spoke with pt re: lab results.     Pt states he's been getting Vit D injections at a clinic, states he's only been twice - two months apart 100,000 Iu's intramuscularly. States he will continue this unless told otherwise.     Pt states he's not taking Crestor, never has. States he's researched statins and would like more info from a specialist on risk vs benefit. Would like to see a Cardiologist, wants to research cardiologist and the area and call us back with preferred physician to place referral.

## 2024-06-06 NOTE — TELEPHONE ENCOUNTER
Spoke with pt re: lab results.     Pt states he's been getting Vit D injections at a clinic, states he's only been twice - two months apart 100,000 Iu's intramuscularly. States he will continue this unless told otherwise.     Pt states he's not taking Crestor, never has. States he's researched statins and would like more info from a specialist on risk vs benefit. Would like to see a Cardiologist, wants to research cardiologist in the area and call us back with preferred physician to place referral.

## 2024-06-06 NOTE — TELEPHONE ENCOUNTER
----- Message from Ambreen Prather MD sent at 6/6/2024  1:22 PM CDT -----  Cholesterol is higher than I would like what dose of Crestor have you been taking and how long have you been on it?  Your vitamin-D level is improved but still a little lower than I would like.  I recommend doubling whatever dose your currently taking.

## 2024-06-10 NOTE — TELEPHONE ENCOUNTER
I dont recommend Vitamin D injections, I recommend OTC oral D3 2,000 IU daily, you level is low still. Referral for cardiology was placed

## 2024-09-04 ENCOUNTER — E-VISIT (OUTPATIENT)
Dept: PRIMARY CARE CLINIC | Facility: CLINIC | Age: 49
End: 2024-09-04
Payer: COMMERCIAL

## 2024-09-04 DIAGNOSIS — J02.9 PHARYNGITIS, UNSPECIFIED ETIOLOGY: Primary | ICD-10-CM

## 2024-09-04 NOTE — PROGRESS NOTES
Subjective:       Patient ID: Josef Sage is a 48 y.o. male.    Chief Complaint: General Illness (Entered automatically based on patient selection in [employer].)    HPI Patient ID: Josef Sage is a 48 y.o. male.    Chief Complaint: General Illness (Entered automatically based on patient selection in [employer].)    The patient initiated a request through [employer] on 9/4/2024 for evaluation and management with a chief complaint of General Illness (Entered automatically based on patient selection in [employer].)     I evaluated the questionnaire submission on 9/4/24.    [facility] Evisit Supergroup-Cough And Cold    9/4/2024  1:30 PM CDT - Filed by Patient   What do you need help with? Cough, Cold, Sore Throat   Do you agree to participate in an E-Visit? Yes   If you have any of the following symptoms, go to your local emergency room or call 911: I acknowledge   What is the main issue you would like addressed today? sore throat. amoxicillin   Do you think you might have COVID or the Flu? No   Have you tested positive for COVID or Flu? No   What symptoms do you currently have?  Headache;  Sore throat;  Pain around the nose and face   Have you had any of the following? Trouble swallowing;  None of the above   Have you ever smoked? I have never smoked   Have you had a fever? No   When did your symptoms first appear? 9/2/2024   In the last two weeks, have you been in close contact with someone who has COVID-19 or the Flu? No   List what you have done or taken to help your symptoms. Vit C and Zinc   How severe are your symptoms? Severe   Have your symptoms gotten better or worse since they started?  Worse   Do you have transportation to get testing if it is needed and ordered for you at an [facility] location? Yes   Provide any additional information you feel is important. Strep throat, headache mild fever   Please attach any relevant images or files    Are you able to take your vital signs? No         Encounter Diagnosis   Name Primary?     Pharyngitis, unspecified etiology Yes        No orders of the defined types were placed in this encounter.     Medications Ordered This Encounter   Medications    amoxicillin-clavulanate 875-125mg (AUGMENTIN) 875-125 mg per tablet     Sig: Take 1 tablet by mouth every 12 (twelve) hours. for 10 days     Dispense:  20 tablet     Refill:  0        No follow-ups on file.      E-Visit Time Tracking:    Day 1 Time (in minutes): 5    Total Time (in minutes): 5              Review of Systems    Objective:      There were no vitals taken for this visit.  Physical Exam    Assessment:       1. Pharyngitis, unspecified etiology        Plan:   Josef was seen today for general illness.    Diagnoses and all orders for this visit:    Pharyngitis, unspecified etiology  -     amoxicillin-clavulanate 875-125mg (AUGMENTIN) 875-125 mg per tablet; Take 1 tablet by mouth every 12 (twelve) hours. for 10 days

## 2024-09-05 RX ORDER — AMOXICILLIN AND CLAVULANATE POTASSIUM 875; 125 MG/1; MG/1
1 TABLET, FILM COATED ORAL EVERY 12 HOURS
Qty: 20 TABLET | Refills: 0 | Status: SHIPPED | OUTPATIENT
Start: 2024-09-05 | End: 2024-09-15

## 2024-10-03 ENCOUNTER — LAB VISIT (OUTPATIENT)
Dept: LAB | Facility: HOSPITAL | Age: 49
End: 2024-10-03
Attending: DERMATOLOGY
Payer: COMMERCIAL

## 2024-10-03 DIAGNOSIS — Z79.899 NEED FOR PROPHYLACTIC CHEMOTHERAPY: ICD-10-CM

## 2024-10-03 DIAGNOSIS — L70.0 ACNE VULGARIS: ICD-10-CM

## 2024-10-03 LAB
ALBUMIN SERPL BCP-MCNC: 4.1 G/DL (ref 3.5–5.2)
ALP SERPL-CCNC: 67 U/L (ref 55–135)
ALT SERPL W/O P-5'-P-CCNC: 24 U/L (ref 10–44)
ANION GAP SERPL CALC-SCNC: 6 MMOL/L (ref 8–16)
AST SERPL-CCNC: 23 U/L (ref 10–40)
BILIRUB SERPL-MCNC: 0.6 MG/DL (ref 0.1–1)
BUN SERPL-MCNC: 14 MG/DL (ref 6–20)
CALCIUM SERPL-MCNC: 9.8 MG/DL (ref 8.7–10.5)
CHLORIDE SERPL-SCNC: 106 MMOL/L (ref 95–110)
CHOLEST SERPL-MCNC: 300 MG/DL (ref 120–199)
CHOLEST/HDLC SERPL: 6.8 {RATIO} (ref 2–5)
CO2 SERPL-SCNC: 24 MMOL/L (ref 23–29)
CREAT SERPL-MCNC: 1.1 MG/DL (ref 0.5–1.4)
EST. GFR  (NO RACE VARIABLE): >60 ML/MIN/1.73 M^2
GLUCOSE SERPL-MCNC: 101 MG/DL (ref 70–110)
HDLC SERPL-MCNC: 44 MG/DL (ref 40–75)
HDLC SERPL: 14.7 % (ref 20–50)
LDLC SERPL CALC-MCNC: 223 MG/DL (ref 63–159)
NONHDLC SERPL-MCNC: 256 MG/DL
POTASSIUM SERPL-SCNC: 4.9 MMOL/L (ref 3.5–5.1)
PROT SERPL-MCNC: 7.7 G/DL (ref 6–8.4)
SODIUM SERPL-SCNC: 136 MMOL/L (ref 136–145)
TRIGL SERPL-MCNC: 165 MG/DL (ref 30–150)

## 2024-10-03 PROCEDURE — 36415 COLL VENOUS BLD VENIPUNCTURE: CPT | Performed by: DERMATOLOGY

## 2024-10-03 PROCEDURE — 80053 COMPREHEN METABOLIC PANEL: CPT | Performed by: DERMATOLOGY

## 2024-10-03 PROCEDURE — 80061 LIPID PANEL: CPT | Performed by: DERMATOLOGY

## 2024-12-01 ENCOUNTER — ANESTHESIA EVENT (OUTPATIENT)
Dept: SURGERY | Facility: HOSPITAL | Age: 49
End: 2024-12-01
Payer: COMMERCIAL

## 2024-12-01 ENCOUNTER — HOSPITAL ENCOUNTER (OUTPATIENT)
Facility: HOSPITAL | Age: 49
Discharge: HOME | End: 2024-12-03
Attending: EMERGENCY MEDICINE | Admitting: SURGERY
Payer: COMMERCIAL

## 2024-12-01 DIAGNOSIS — R00.0 TACHYCARDIA: ICD-10-CM

## 2024-12-01 DIAGNOSIS — R10.11 RIGHT UPPER QUADRANT ABDOMINAL PAIN: ICD-10-CM

## 2024-12-01 DIAGNOSIS — K81.9 CHOLECYSTITIS: ICD-10-CM

## 2024-12-01 DIAGNOSIS — K80.00 CALCULUS OF GALLBLADDER WITH ACUTE CHOLECYSTITIS WITHOUT OBSTRUCTION: Primary | ICD-10-CM

## 2024-12-01 DIAGNOSIS — E87.6 HYPOKALEMIA: ICD-10-CM

## 2024-12-01 DIAGNOSIS — L65.9 ALOPECIA: ICD-10-CM

## 2024-12-01 DIAGNOSIS — E80.6 HYPERBILIRUBINEMIA: ICD-10-CM

## 2024-12-01 DIAGNOSIS — F41.8 MIXED ANXIETY AND DEPRESSIVE DISORDER: ICD-10-CM

## 2024-12-01 LAB
ALBUMIN SERPL BCP-MCNC: 4.3 G/DL (ref 3.5–5.2)
ALP SERPL-CCNC: 61 U/L (ref 40–150)
ALT SERPL W/O P-5'-P-CCNC: 24 U/L (ref 10–44)
ANION GAP SERPL CALC-SCNC: 10 MMOL/L (ref 8–16)
AST SERPL-CCNC: 22 U/L (ref 10–40)
BASOPHILS # BLD AUTO: 0.04 K/UL (ref 0–0.2)
BASOPHILS NFR BLD: 0.3 % (ref 0–1.9)
BILIRUB SERPL-MCNC: 0.3 MG/DL (ref 0.1–1)
BILIRUB UR QL STRIP: NEGATIVE
BUN SERPL-MCNC: 19 MG/DL (ref 6–20)
CALCIUM SERPL-MCNC: 9.5 MG/DL (ref 8.7–10.5)
CHLORIDE SERPL-SCNC: 106 MMOL/L (ref 95–110)
CLARITY UR REFRACT.AUTO: CLEAR
CO2 SERPL-SCNC: 22 MMOL/L (ref 23–29)
COLOR UR AUTO: YELLOW
CREAT SERPL-MCNC: 1 MG/DL (ref 0.5–1.4)
DIFFERENTIAL METHOD BLD: ABNORMAL
EOSINOPHIL # BLD AUTO: 0 K/UL (ref 0–0.5)
EOSINOPHIL NFR BLD: 0 % (ref 0–8)
ERYTHROCYTE [DISTWIDTH] IN BLOOD BY AUTOMATED COUNT: 12.1 % (ref 11.5–14.5)
EST. GFR  (NO RACE VARIABLE): >60 ML/MIN/1.73 M^2
GLUCOSE SERPL-MCNC: 137 MG/DL (ref 70–110)
GLUCOSE UR QL STRIP: NEGATIVE
HCT VFR BLD AUTO: 41.5 % (ref 40–54)
HGB BLD-MCNC: 14.5 G/DL (ref 14–18)
HGB UR QL STRIP: NEGATIVE
IMM GRANULOCYTES # BLD AUTO: 0.22 K/UL (ref 0–0.04)
IMM GRANULOCYTES NFR BLD AUTO: 1.4 % (ref 0–0.5)
KETONES UR QL STRIP: ABNORMAL
LEUKOCYTE ESTERASE UR QL STRIP: NEGATIVE
LIPASE SERPL-CCNC: 19 U/L (ref 4–60)
LYMPHOCYTES # BLD AUTO: 0.5 K/UL (ref 1–4.8)
LYMPHOCYTES NFR BLD: 3.3 % (ref 18–48)
MCH RBC QN AUTO: 28.5 PG (ref 27–31)
MCHC RBC AUTO-ENTMCNC: 34.9 G/DL (ref 32–36)
MCV RBC AUTO: 82 FL (ref 82–98)
MONOCYTES # BLD AUTO: 0.4 K/UL (ref 0.3–1)
MONOCYTES NFR BLD: 2.7 % (ref 4–15)
NEUTROPHILS # BLD AUTO: 14.2 K/UL (ref 1.8–7.7)
NEUTROPHILS NFR BLD: 92.3 % (ref 38–73)
NITRITE UR QL STRIP: NEGATIVE
NRBC BLD-RTO: 0 /100 WBC
OHS QRS DURATION: 80 MS
OHS QTC CALCULATION: 420 MS
PH UR STRIP: 8 [PH] (ref 5–8)
PLATELET # BLD AUTO: 281 K/UL (ref 150–450)
PMV BLD AUTO: 9.7 FL (ref 9.2–12.9)
POTASSIUM SERPL-SCNC: 3.6 MMOL/L (ref 3.5–5.1)
PROT SERPL-MCNC: 7.4 G/DL (ref 6–8.4)
PROT UR QL STRIP: NEGATIVE
RBC # BLD AUTO: 5.08 M/UL (ref 4.6–6.2)
SODIUM SERPL-SCNC: 138 MMOL/L (ref 136–145)
SP GR UR STRIP: 1.02 (ref 1–1.03)
URN SPEC COLLECT METH UR: ABNORMAL
WBC # BLD AUTO: 15.38 K/UL (ref 3.9–12.7)

## 2024-12-01 PROCEDURE — 96365 THER/PROPH/DIAG IV INF INIT: CPT

## 2024-12-01 PROCEDURE — 85025 COMPLETE CBC W/AUTO DIFF WBC: CPT

## 2024-12-01 PROCEDURE — G0378 HOSPITAL OBSERVATION PER HR: HCPCS

## 2024-12-01 PROCEDURE — 81003 URINALYSIS AUTO W/O SCOPE: CPT

## 2024-12-01 PROCEDURE — 63600175 PHARM REV CODE 636 W HCPCS

## 2024-12-01 PROCEDURE — 96372 THER/PROPH/DIAG INJ SC/IM: CPT | Performed by: STUDENT IN AN ORGANIZED HEALTH CARE EDUCATION/TRAINING PROGRAM

## 2024-12-01 PROCEDURE — 93010 ELECTROCARDIOGRAM REPORT: CPT | Mod: ,,, | Performed by: INTERNAL MEDICINE

## 2024-12-01 PROCEDURE — 83690 ASSAY OF LIPASE: CPT

## 2024-12-01 PROCEDURE — 96361 HYDRATE IV INFUSION ADD-ON: CPT

## 2024-12-01 PROCEDURE — 80053 COMPREHEN METABOLIC PANEL: CPT

## 2024-12-01 PROCEDURE — 25500020 PHARM REV CODE 255: Performed by: EMERGENCY MEDICINE

## 2024-12-01 PROCEDURE — 99285 EMERGENCY DEPT VISIT HI MDM: CPT | Mod: 25

## 2024-12-01 PROCEDURE — 96376 TX/PRO/DX INJ SAME DRUG ADON: CPT

## 2024-12-01 PROCEDURE — 25000003 PHARM REV CODE 250: Performed by: STUDENT IN AN ORGANIZED HEALTH CARE EDUCATION/TRAINING PROGRAM

## 2024-12-01 PROCEDURE — 63600175 PHARM REV CODE 636 W HCPCS: Performed by: STUDENT IN AN ORGANIZED HEALTH CARE EDUCATION/TRAINING PROGRAM

## 2024-12-01 PROCEDURE — 96366 THER/PROPH/DIAG IV INF ADDON: CPT

## 2024-12-01 PROCEDURE — 25000003 PHARM REV CODE 250

## 2024-12-01 PROCEDURE — 99236 HOSP IP/OBS SAME DATE HI 85: CPT | Mod: 57,ICN,, | Performed by: SURGERY

## 2024-12-01 PROCEDURE — 96375 TX/PRO/DX INJ NEW DRUG ADDON: CPT | Mod: 59

## 2024-12-01 PROCEDURE — 93005 ELECTROCARDIOGRAM TRACING: CPT

## 2024-12-01 RX ORDER — MORPHINE SULFATE 2 MG/ML
2 INJECTION, SOLUTION INTRAMUSCULAR; INTRAVENOUS EVERY 4 HOURS PRN
Status: DISCONTINUED | OUTPATIENT
Start: 2024-12-01 | End: 2024-12-01

## 2024-12-01 RX ORDER — HYDROMORPHONE HYDROCHLORIDE 1 MG/ML
0.5 INJECTION, SOLUTION INTRAMUSCULAR; INTRAVENOUS; SUBCUTANEOUS EVERY 4 HOURS PRN
Status: DISCONTINUED | OUTPATIENT
Start: 2024-12-01 | End: 2024-12-02

## 2024-12-01 RX ORDER — HYDROMORPHONE HYDROCHLORIDE 1 MG/ML
1 INJECTION, SOLUTION INTRAMUSCULAR; INTRAVENOUS; SUBCUTANEOUS
Status: COMPLETED | OUTPATIENT
Start: 2024-12-01 | End: 2024-12-01

## 2024-12-01 RX ORDER — ONDANSETRON HYDROCHLORIDE 2 MG/ML
4 INJECTION, SOLUTION INTRAVENOUS
Status: COMPLETED | OUTPATIENT
Start: 2024-12-01 | End: 2024-12-01

## 2024-12-01 RX ORDER — MORPHINE SULFATE 4 MG/ML
4 INJECTION, SOLUTION INTRAMUSCULAR; INTRAVENOUS
Status: COMPLETED | OUTPATIENT
Start: 2024-12-01 | End: 2024-12-01

## 2024-12-01 RX ORDER — SODIUM CHLORIDE, SODIUM LACTATE, POTASSIUM CHLORIDE, CALCIUM CHLORIDE 600; 310; 30; 20 MG/100ML; MG/100ML; MG/100ML; MG/100ML
INJECTION, SOLUTION INTRAVENOUS CONTINUOUS
Status: DISCONTINUED | OUTPATIENT
Start: 2024-12-02 | End: 2024-12-02

## 2024-12-01 RX ORDER — ENOXAPARIN SODIUM 100 MG/ML
40 INJECTION SUBCUTANEOUS EVERY 24 HOURS
Status: DISCONTINUED | OUTPATIENT
Start: 2024-12-01 | End: 2024-12-03 | Stop reason: HOSPADM

## 2024-12-01 RX ORDER — ACETAMINOPHEN 325 MG/1
650 TABLET ORAL EVERY 8 HOURS PRN
Status: DISCONTINUED | OUTPATIENT
Start: 2024-12-01 | End: 2024-12-02

## 2024-12-01 RX ORDER — HYDROMORPHONE HYDROCHLORIDE 1 MG/ML
2 INJECTION, SOLUTION INTRAMUSCULAR; INTRAVENOUS; SUBCUTANEOUS
Status: COMPLETED | OUTPATIENT
Start: 2024-12-01 | End: 2024-12-01

## 2024-12-01 RX ORDER — HYDROMORPHONE HYDROCHLORIDE 1 MG/ML
1 INJECTION, SOLUTION INTRAMUSCULAR; INTRAVENOUS; SUBCUTANEOUS
Status: DISCONTINUED | OUTPATIENT
Start: 2024-12-01 | End: 2024-12-01

## 2024-12-01 RX ORDER — HYDROMORPHONE HYDROCHLORIDE 1 MG/ML
0.5 INJECTION, SOLUTION INTRAMUSCULAR; INTRAVENOUS; SUBCUTANEOUS EVERY 6 HOURS PRN
Status: DISCONTINUED | OUTPATIENT
Start: 2024-12-01 | End: 2024-12-01

## 2024-12-01 RX ORDER — ONDANSETRON 8 MG/1
8 TABLET, ORALLY DISINTEGRATING ORAL EVERY 8 HOURS PRN
Status: DISCONTINUED | OUTPATIENT
Start: 2024-12-01 | End: 2024-12-03 | Stop reason: HOSPADM

## 2024-12-01 RX ORDER — MORPHINE SULFATE 4 MG/ML
4 INJECTION, SOLUTION INTRAMUSCULAR; INTRAVENOUS EVERY 4 HOURS PRN
Status: DISCONTINUED | OUTPATIENT
Start: 2024-12-01 | End: 2024-12-01

## 2024-12-01 RX ADMIN — ONDANSETRON 4 MG: 2 INJECTION INTRAMUSCULAR; INTRAVENOUS at 09:12

## 2024-12-01 RX ADMIN — HYDROMORPHONE HYDROCHLORIDE 0.5 MG: 1 INJECTION, SOLUTION INTRAMUSCULAR; INTRAVENOUS; SUBCUTANEOUS at 08:12

## 2024-12-01 RX ADMIN — HYDROMORPHONE HYDROCHLORIDE 0.5 MG: 1 INJECTION, SOLUTION INTRAMUSCULAR; INTRAVENOUS; SUBCUTANEOUS at 05:12

## 2024-12-01 RX ADMIN — PIPERACILLIN SODIUM AND TAZOBACTAM SODIUM 4.5 G: 4; .5 INJECTION, POWDER, FOR SOLUTION INTRAVENOUS at 05:12

## 2024-12-01 RX ADMIN — ONDANSETRON 8 MG: 8 TABLET, ORALLY DISINTEGRATING ORAL at 08:12

## 2024-12-01 RX ADMIN — SODIUM CHLORIDE 1000 ML: 9 INJECTION, SOLUTION INTRAVENOUS at 09:12

## 2024-12-01 RX ADMIN — ENOXAPARIN SODIUM 40 MG: 40 INJECTION SUBCUTANEOUS at 05:12

## 2024-12-01 RX ADMIN — IOHEXOL 100 ML: 350 INJECTION, SOLUTION INTRAVENOUS at 11:12

## 2024-12-01 RX ADMIN — HYDROMORPHONE HYDROCHLORIDE 1 MG: 1 INJECTION, SOLUTION INTRAMUSCULAR; INTRAVENOUS; SUBCUTANEOUS at 10:12

## 2024-12-01 RX ADMIN — MORPHINE SULFATE 4 MG: 4 INJECTION INTRAVENOUS at 09:12

## 2024-12-01 RX ADMIN — HYDROMORPHONE HYDROCHLORIDE 2 MG: 1 INJECTION, SOLUTION INTRAMUSCULAR; INTRAVENOUS; SUBCUTANEOUS at 01:12

## 2024-12-01 NOTE — ED PROVIDER NOTES
Encounter Date: 12/1/2024       History     Chief Complaint   Patient presents with    Abdominal Pain     Vomiting,      48 y.o. male with a PMH of anxiety, asthma, bronchitis, cervical spondylosis with radiculopathy, HLD [place] [facility] for evaluation of epigastric abdominal pain, nausea, 3 episodes of watery nonbloody emesis, and 1 episode of nonbloody loose stools.  Symptoms began at 2:00 a.m. this morning and woke him up from sleep.  He reports he was taking Ozempic for weight loss and thinks that he either has pancreatitis or that he may having too much sushi last night and the fullness is causing his severe pain.  He reports the pain as 10/10 and constant radiating to his back.  He denies fever, chills, cough, congestion, chest pain, dyspnea, dysuria, hematuria, testicular pain, penile discharge, or lower extremity edema.          The history is provided by the patient, medical records and a parent (Patient's mom at bedside).     Review of patient's allergies indicates:   Allergen Reactions    Sulfa (sulfonamide antibiotics) Rash     Past Medical History:   Diagnosis Date    Anxiety     Asthma     Bronchitis     Cervical spondylosis with radiculopathy 05/10/2023    Mixed hyperlipidemia 09/17/2019    Recurrent upper respiratory infection (URI)     Wheezes      Past Surgical History:   Procedure Laterality Date    ANTERIOR CERVICAL DISCECTOMY W/ FUSION N/A 05/10/2023    Procedure: C5-6 ACDF;  Surgeon: Ivan Dalton MD;  Location: [address];  Service: Neurosurgery;  Laterality: N/A;  C5-6 ACDF  anesthesia: general  nerve mon: EMG,SEP, MEP  radiology; C-ARM  bed: regular slider  headrest: MINDI eddy/hanging weights/ surgical pillow  misc; interbody paddle distractor, nerve root retractor (depuy)    LAPAROSCOPIC CHOLECYSTECTOMY N/A 12/2/2024    Procedure: CHOLECYSTECTOMY, LAPAROSCOPIC;  Surgeon: Sahil James MD;  Location: [address];  Service: General;  Laterality:  N/A;  with intraoperative cholangiogram    MOUTH SURGERY      wisdom teeth    None       Family History   Problem Relation Name Age of Onset    Hypertension Mother Sagar Leal MD     Hypertension Father Moiht MANINDER Leal MD     Eczema Sister      Cancer Maternal Cousin          colon    Heart disease Neg Hx      Hyperlipidemia Neg Hx      Diabetes Neg Hx       Social History     Tobacco Use    Smoking status: Never    Smokeless tobacco: Never   Substance Use Topics    Alcohol use: Not Currently     Alcohol/week: 1.0 standard drink of alcohol     Types: 1 Glasses of wine per week    Drug use: No     Comment: CBD gummies     Review of Systems    Physical Exam     Initial Vitals [12/01/24 0749]   BP Pulse Resp Temp SpO2   (!) 207/101 110 18 98.3 °F (36.8 °C) 100 %      MAP       --         Physical Exam    Nursing note and vitals reviewed.  Constitutional: He appears well-developed and well-nourished. He is cooperative. No distress.   Appears uncomfortable secondary to pain   HENT:   Head: Normocephalic. Mouth/Throat: Oropharynx is clear and moist.   Eyes: Pupils are equal, round, and reactive to light. No scleral icterus.   Neck: Neck supple.   Cardiovascular:  Normal rate and regular rhythm.           Pulmonary/Chest: Breath sounds normal. No stridor. No respiratory distress.   Abdominal: Abdomen is soft. He exhibits no distension. There is abdominal tenderness (Moderate epigastric tenderness, negative Melgar's sign, mild RUQ tenderness.  No RLQ or LLQ tenderness to palpation.). There is no rebound and no guarding.   Musculoskeletal:         General: No edema.      Cervical back: Neck supple.     Neurological: He is alert. GCS score is 15. GCS eye subscore is 4. GCS verbal subscore is 5. GCS motor subscore is 6.   Skin: Skin is warm and dry. No rash noted.         ED Course   Procedures  Labs Reviewed   CBC W/ AUTO DIFFERENTIAL - Abnormal       Result Value    WBC 15.38 (*)     RBC 5.08      Hemoglobin 14.5       Hematocrit 41.5      MCV 82      MCH 28.5      MCHC 34.9      RDW 12.1      Platelets 281      MPV 9.7      Immature Granulocytes 1.4 (*)     Gran # (ANC) 14.2 (*)     Immature Grans (Abs) 0.22 (*)     Lymph # 0.5 (*)     Mono # 0.4      Eos # 0.0      Baso # 0.04      nRBC 0      Gran % 92.3 (*)     Lymph % 3.3 (*)     Mono % 2.7 (*)     Eosinophil % 0.0      Basophil % 0.3      Differential Method Automated     COMPREHENSIVE METABOLIC PANEL - Abnormal    Sodium 138      Potassium 3.6      Chloride 106      CO2 22 (*)     Glucose 137 (*)     BUN 19      Creatinine 1.0      Calcium 9.5      Total Protein 7.4      Albumin 4.3      Total Bilirubin 0.3      Alkaline Phosphatase 61      AST 22      ALT 24      eGFR >60.0      Anion Gap 10     URINALYSIS, REFLEX TO URINE CULTURE - Abnormal    Specimen UA Urine, Clean Catch      Color, UA Yellow      Appearance, UA Clear      pH, UA 8.0      Specific Gravity, UA 1.025      Protein, UA Negative      Glucose, UA Negative      Ketones, UA 1+ (*)     Bilirubin (UA) Negative      Occult Blood UA Negative      Nitrite, UA Negative      Leukocytes, UA Negative      Narrative:     Specimen Source->Urine   LIPASE    Lipase 19          ECG Results              EKG 12-lead (Final result)        Collection Time Result Time QRS Duration OHS QTC Calculation    12/01/24 08:03:38 12/01/24 10:07:15 80 420                     Final result by Interface, Lab In [facility] (12/01/24 10:07:20)                   Narrative:    Test Reason : R00.0,    Vent. Rate :  90 BPM     Atrial Rate :  90 BPM     P-R Int : 146 ms          QRS Dur :  80 ms      QT Int : 344 ms       P-R-T Axes :  45  56  47 degrees    QTcB Int : 420 ms    Normal sinus rhythm  Normal ECG  When compared with ECG of 07-Dec-2023 09:24,  Nonspecific T wave abnormality now evident in Anterior leads  Confirmed by Scotty Parker ([id number]) on 12/1/2024 10:07:11 AM    Referred By: AAAREFERRAL SELF           Confirmed By: Scotty Parker                                   Imaging Results              US Abdomen Limited (Gallbladder) (Final result)  Result time 12/01/24 15:29:19      Final result by Bert Sharma MD (12/01/24 15:29:19)                   Impression:      Cholelithiasis.  Gallbladder wall thickening with suspected trace pericholecystic fluid which can be seen with acute or chronic cholecystitis, noting no significant wall hyperemia.  Sonographic Melgar sign is reportedly negative; although, patient reportedly received pain medication prior to the study.  If clinical concern for acute cholecystitis persists, further evaluation with HIDA scan can be obtained as warranted.      Electronically signed by: Bert Sharma MD  Date:    12/01/2024  Time:    15:29               Narrative:    EXAMINATION:  US ABDOMEN LIMITED    CLINICAL HISTORY:  Cholecystitis, unspecified    TECHNIQUE:  Limited ultrasound of the right upper quadrant of the abdomen focused on the biliary system    COMPARISON:  CT abdomen and pelvis earlier same date    FINDINGS:  Gallbladder: Normally distended containing multiple mobile stones measuring up to 1.3 cm.  Gallbladder wall measures up to 6 mm in thickness without hyperemia.  Suspected trace pericholecystic fluid.  Sonographic Melgar sign is reportedly negative; although, patient reportedly received pain medication prior to the study.    Biliary system: The common duct is not dilated, measuring 3 mm.  No intrahepatic ductal dilatation.    Miscellaneous: No upper abdominal ascites elsewhere.  Imaged portions of the pancreas are within normal limits.  No right hydronephrosis.  Incidentally noted 1 cm simple cyst at the right renal upper pole.                                       CT Abdomen Pelvis With IV Contrast NO Oral Contrast (Final result)  Result time 12/01/24 12:58:59      Final result by Yaw Bethea MD (12/01/24 12:58:59)                   Impression:      1. Cholelithiasis with question mild gallbladder wall  thickening.  Clinical correlation recommended.  Ultrasound may be considered, as warranted clinically.  2. Additional details of chronic and incidental findings, as provided in the body of the report.      Electronically signed by: Yaw Bethea  Date:    12/01/2024  Time:    12:58               Narrative:    EXAMINATION:  CT ABDOMEN PELVIS WITH IV CONTRAST    CLINICAL HISTORY:  Abdominal pain, acute, nonlocalized;    TECHNIQUE:  Low dose axial images, sagittal and coronal reformations were obtained from the lung bases to the pubic symphysis following the IV administration of 100 mL of Omnipaque 350    COMPARISON:  None.    FINDINGS:  Lower chest: Minor atelectasis at the lung bases.    Liver: Unremarkable.    Gallbladder and bile ducts: Cholelithiasis.  Question mild gallbladder wall thickening.  No definite pericholecystic inflammation or biliary ductal dilatation.    Pancreas: Unremarkable.    Spleen: Unremarkable.    Adrenals: Unremarkable.    Kidneys: Subcentimeter hypodense lesion right midpole too small to fully characterize.    Lymph nodes: No abdominal or pelvic lymphadenopathy.    Bowel and mesentery: Small hiatal hernia.  No evidence of bowel obstruction.  Colonic diverticulosis.  Normal appendix.    Abdominal aorta: Nonaneurysmal.  Relatively minimal aortoiliac atherosclerotic calcification    Inferior vena cava: Unremarkable.    Free fluid or free air: None.    Pelvis: Unremarkable.    Body wall: Unremarkable.    Bones: No acute abnormality.  Relatively modest degenerative change of the spine.                                       Medications   ondansetron injection 4 mg (4 mg Intravenous Given 12/1/24 0912)   morphine injection 4 mg (4 mg Intravenous Given 12/1/24 0912)   sodium chloride 0.9% bolus 1,000 mL 1,000 mL (0 mLs Intravenous Stopped 12/1/24 1110)   HYDROmorphone injection 1 mg (1 mg Intravenous Given 12/1/24 1046)   iohexoL (OMNIPAQUE 350) injection 100 mL (100 mLs Intravenous Given 12/1/24  1159)   HYDROmorphone injection 2 mg (2 mg Intravenous Given 12/1/24 1347)   fentaNYL 50 mcg/mL injection 25 mcg (25 mcg Intravenous Given 12/2/24 1654)   potassium chloride SA CR tablet 40 mEq (40 mEq Oral Given 12/2/24 4319)   HYDROmorphone injection 0.2 mg (0.2 mg Intravenous Given 12/2/24 1916)   gadobutroL (GADAVIST) injection 9 mL (9 mLs Intravenous Given 12/3/24 1351)     Medical Decision Making  48-year-old male presents to the ED for evaluation of epigastric pain, nausea, vomiting, and loose stools.    Patient is afebrile, hemodynamically stable, and in no acute distress on arrival.   Differential diagnoses considered include, but not limited to:  Pancreatitis, gastroparesis, PUD, cholecystitis, cholelithiasis, choledocholithiasis, gastroenteritis    Analgesics and antiemetics given.  Labs significant for leukocytosis, WBC 15, UA with 1+ ketones but no blood or evidence of infection.  Normal lipase.  Normal LFTs.  CT imaging demonstrates cholelithiasis with mild gallbladder wall thickening.  Right upper quadrant ultrasound obtained which demonstrates cholelithiasis, gallbladder wall thickening, and trace pericholecystic fluid noting no significant wall hyperemia and negative sonographic Melgar's sign with the patient did receive Dilaudid just prior to ultrasound steady.    Patient has required multiple doses of opioid medication to control his pain.  Consulted and discussed with general surgery due to concern for symptomatic cholelithiasis versus cholecystitis.  General surgery has evaluated the patient emergently at bedside and will accept him to their service for further evaluation and management.  Discussed all findings and plan with the patient in his mom at bedside who expressed understanding and agreement.    Amount and/or Complexity of Data Reviewed  Labs: ordered.  Radiology: ordered.    Risk  Prescription drug management.  Decision regarding hospitalization.              Attending Attestation:    Physician Attestation Statement for Resident:  As the supervising MD   Physician Attestation Statement: I have personally seen and examined this patient.   I agree with the above history.  -:   As the supervising MD I agree with the above PE.     As the supervising MD I agree with the above treatment, course, plan, and disposition.                Attending ED Notes:   STAFF ATTENDING PHYSICIAN NOTE:  I have individually/jointly evaluated Josef Sage and discussed their ED management with the resident physician. I have also reviewed their notes, assessments, and procedures documented.  I was present during all critical portions of any procedure(s) performed on Josef Sage.   ____________________  Chet Jensen MD, FAAEM  Emergency Medicine Staff                               Clinical Impression:  Final diagnoses:  [R00.0] Tachycardia  [K81.9] Cholecystitis  [R10.11] Right upper quadrant abdominal pain          ED Disposition Condition    Observation                 Trina Shannon MD  Resident  12/01/24 0551       Harish Jensen MD  12/11/24 [id number]

## 2024-12-01 NOTE — H&P
Please see consult note dated 12/1/24 for full H&P      Allen Garcia MD   General Surgery, PGY5  [phone]

## 2024-12-01 NOTE — CONSULTS
[facility] - Emergency Dept  General Surgery  Consult Note    Patient Name: Josef Sage  MRN: [id number]  Code Status: Full Code  Admission Date: 12/1/2024  Hospital Length of Stay: 0 days  Attending Physician: Sahil James MD  Primary Care Provider: Ambreen Prather MD    Patient information was obtained from patient and past medical records.     Inpatient consult to General surgery  Consult performed by: Allen Garcia MD  Consult ordered by: Trina Shannon MD        Subjective:     Principal Problem: Cholelithiasis with acute cholecystitis    History of Present Illness: Josef Sage is a 48yoM with a history of hyperlipidemia who presents to the emergency department with complaints of severe epigastric pain.  The patient describes similar episodes but none this severe.  He endorses associated nausea and vomiting for the last 24 hours.  On arrival, the patient is tachycardic and hypertensive.  He continues to endorse nausea and severe epigastric abdominal pain.  Workup demonstrates an elevated leukocytosis and a CT scan that demonstrates a distended gallbladder with significant gallstone burden.  His pain is unrelieved despite multiple IV pain medication doses.  General surgery was consulted for evaluation.    At the time of my exam, the patient is resting in bed.  He continues to endorse abdominal discomfort, although improved with pain medications.  He has never had a history of intra-abdominal surgery.  He takes no blood thinning medications.  I discussed the plan of care with him and he is in agreement.    No current facility-administered medications on file prior to encounter.     Current Outpatient Medications on File Prior to Encounter   Medication Sig    albuterol (PROVENTIL/VENTOLIN HFA) 90 mcg/actuation inhaler Inhale 1-2 puffs into the lungs every 6 (six) hours as needed for Wheezing.    EScitalopram oxalate (LEXAPRO) 10 MG tablet Take 1 tablet (10 mg total) by mouth every evening.     fluticasone propionate (FLONASE) 50 mcg/actuation nasal spray 2 sprays (100 mcg total) by Each Nostril route 2 (two) times daily.    multivitamin capsule Take 1 capsule by mouth once daily.    promethazine (PHENERGAN) 25 MG suppository Place 1 suppository (25 mg total) rectally every 6 (six) hours as needed for Nausea.    PROPECIA 1 mg tablet Take 1 tablet (1 mg total) by mouth once daily.    rosuvastatin (CRESTOR) 20 MG tablet Take 1 tablet (20 mg total) by mouth once daily.       Review of patient's allergies indicates:   Allergen Reactions    Sulfa (sulfonamide antibiotics) Rash       Past Medical History:   Diagnosis Date    Anxiety     Asthma     Bronchitis     Cervical spondylosis with radiculopathy 05/10/2023    Mixed hyperlipidemia 09/17/2019    Recurrent upper respiratory infection (URI)     Wheezes      Past Surgical History:   Procedure Laterality Date    ANTERIOR CERVICAL DISCECTOMY W/ FUSION N/A 05/10/2023    Procedure: C5-6 ACDF;  Surgeon: Ivan Dalton MD;  Location: [facility] OR [address] [place] [address];  Service: Neurosurgery;  Laterality: N/A;  C5-6 ACDF  anesthesia: general  nerve mon: EMG,SEP, MEP  radiology; C-ARM  bed: regular slider  headrest: caspar, MINDI tongs/hanging weights/ surgical pillow  misc; interbody paddle distractor, nerve root retractor (depuy)    MOUTH SURGERY      wisdom teeth    None       Family History       Problem Relation (Age of Onset)    Cancer Maternal Cousin    Eczema Sister    Hypertension Mother, Father          Tobacco Use    Smoking status: Never    Smokeless tobacco: Never   Substance and Sexual Activity    Alcohol use: Not Currently     Alcohol/week: 1.0 standard drink of alcohol     Types: 1 Glasses of wine per week    Drug use: No     Comment: CBD gummies    Sexual activity: Yes     Partners: Female     Comment: Not      Review of Systems   Constitutional:  Positive for activity change and appetite change. Negative for fatigue and fever.   HENT: Negative.     Respiratory:   Negative for cough, choking and shortness of breath.    Cardiovascular:  Negative for chest pain.   Gastrointestinal:  Positive for abdominal distention, abdominal pain, nausea and vomiting. Negative for constipation and diarrhea.   Musculoskeletal: Negative.    Skin: Negative.      Objective:     Vital Signs (Most Recent):  Temp: 98.3 °F (36.8 °C) (12/01/24 0749)  Pulse: 101 (12/01/24 1527)  Resp: 18 (12/01/24 1347)  BP: 123/81 (12/01/24 1527)  SpO2: 97 % (12/01/24 1527) Vital Signs (24h Range):  Temp:  [98.3 °F (36.8 °C)] 98.3 °F (36.8 °C)  Pulse:  [] 101  Resp:  [16-19] 18  SpO2:  [96 %-100 %] 97 %  BP: (123-207)/() 123/81     Weight: 77.1 kg (170 lb)  Body mass index is 23.71 kg/m².     Physical Exam  Vitals and nursing note reviewed.   Constitutional:       General: He is not in acute distress.     Appearance: Normal appearance.   HENT:      Mouth/Throat:      Mouth: Mucous membranes are moist.   Cardiovascular:      Rate and Rhythm: Normal rate and regular rhythm.   Pulmonary:      Effort: Pulmonary effort is normal. No respiratory distress.   Abdominal:      Comments: Abdomen soft, non-distended  Tender to deep palpation to the right upper quadrant   Musculoskeletal:         General: Normal range of motion.   Skin:     General: Skin is warm and dry.   Neurological:      Mental Status: He is alert.            I have reviewed all pertinent lab results within the past 24 hours.  CBC:   Recent Labs   Lab 12/01/24  0838   WBC 15.38*   RBC 5.08   HGB 14.5   HCT 41.5      MCV 82   MCH 28.5   MCHC 34.9     CMP:   Recent Labs   Lab 12/01/24  0838   *   CALCIUM 9.5   ALBUMIN 4.3   PROT 7.4      K 3.6   CO2 22*      BUN 19   CREATININE 1.0   ALKPHOS 61   ALT 24   AST 22   BILITOT 0.3       Significant Diagnostics:  I have reviewed all pertinent imaging results/findings within the past 24 hours.    Assessment/Plan:     * Cholelithiasis with acute cholecystitis  Josef aSge is a 48yoM  who presents to the emergency department with right upper quadrant abdominal pain. His work-up is consistent with cholecystitis.     - admit to general surgery  - plan for laparoscopic cholecystectomy on 12/2  - ok for clears   - NPO at midnight  - IV zosyn  - PRN pain and anti-emetics  - please call with questions      VTE Risk Mitigation (From admission, onward)           Ordered     enoxaparin injection 40 mg  Daily         12/01/24 1553     Place sequential compression device  Until discontinued         12/01/24 1553     IP VTE LOW RISK PATIENT  Once         12/01/24 1553                    Thank you for your consult. I will follow-up with patient. Please contact us if you have any additional questions.    Allen Garcia MD  General Surgery  [facility] - Emergency Dept

## 2024-12-01 NOTE — SUBJECTIVE & OBJECTIVE
No current facility-administered medications on file prior to encounter.     Current Outpatient Medications on File Prior to Encounter   Medication Sig    albuterol (PROVENTIL/VENTOLIN HFA) 90 mcg/actuation inhaler Inhale 1-2 puffs into the lungs every 6 (six) hours as needed for Wheezing.    EScitalopram oxalate (LEXAPRO) 10 MG tablet Take 1 tablet (10 mg total) by mouth every evening.    fluticasone propionate (FLONASE) 50 mcg/actuation nasal spray 2 sprays (100 mcg total) by Each Nostril route 2 (two) times daily.    multivitamin capsule Take 1 capsule by mouth once daily.    promethazine (PHENERGAN) 25 MG suppository Place 1 suppository (25 mg total) rectally every 6 (six) hours as needed for Nausea.    PROPECIA 1 mg tablet Take 1 tablet (1 mg total) by mouth once daily.    rosuvastatin (CRESTOR) 20 MG tablet Take 1 tablet (20 mg total) by mouth once daily.       Review of patient's allergies indicates:   Allergen Reactions    Sulfa (sulfonamide antibiotics) Rash       Past Medical History:   Diagnosis Date    Anxiety     Asthma     Bronchitis     Cervical spondylosis with radiculopathy 05/10/2023    Mixed hyperlipidemia 09/17/2019    Recurrent upper respiratory infection (URI)     Wheezes      Past Surgical History:   Procedure Laterality Date    ANTERIOR CERVICAL DISCECTOMY W/ FUSION N/A 05/10/2023    Procedure: C5-6 ACDF;  Surgeon: Ivan Dalton MD;  Location: [facility] OR [address] [place] [address];  Service: Neurosurgery;  Laterality: N/A;  C5-6 ACDF  anesthesia: general  nerve mon: EMG,SEP, MEP  radiology; C-ARM  bed: regular slider  headrest: MINDI eddy/hanging weights/ surgical pillow  misc; interbody paddle distractor, nerve root retractor (depuy)    MOUTH SURGERY      wisdom teeth    None       Family History       Problem Relation (Age of Onset)    Cancer Maternal Cousin    Eczema Sister    Hypertension Mother, Father          Tobacco Use    Smoking status: Never    Smokeless tobacco: Never   Substance and Sexual  Activity    Alcohol use: Not Currently     Alcohol/week: 1.0 standard drink of alcohol     Types: 1 Glasses of wine per week    Drug use: No     Comment: CBD gummies    Sexual activity: Yes     Partners: Female     Comment: Not      Review of Systems   Constitutional:  Positive for activity change and appetite change. Negative for fatigue and fever.   HENT: Negative.     Respiratory:  Negative for cough, choking and shortness of breath.    Cardiovascular:  Negative for chest pain.   Gastrointestinal:  Positive for abdominal distention, abdominal pain, nausea and vomiting. Negative for constipation and diarrhea.   Musculoskeletal: Negative.    Skin: Negative.      Objective:     Vital Signs (Most Recent):  Temp: 98.3 °F (36.8 °C) (12/01/24 0749)  Pulse: 101 (12/01/24 1527)  Resp: 18 (12/01/24 1347)  BP: 123/81 (12/01/24 1527)  SpO2: 97 % (12/01/24 1527) Vital Signs (24h Range):  Temp:  [98.3 °F (36.8 °C)] 98.3 °F (36.8 °C)  Pulse:  [] 101  Resp:  [16-19] 18  SpO2:  [96 %-100 %] 97 %  BP: (123-207)/() 123/81     Weight: 77.1 kg (170 lb)  Body mass index is 23.71 kg/m².     Physical Exam  Vitals and nursing note reviewed.   Constitutional:       General: He is not in acute distress.     Appearance: Normal appearance.   HENT:      Mouth/Throat:      Mouth: Mucous membranes are moist.   Cardiovascular:      Rate and Rhythm: Normal rate and regular rhythm.   Pulmonary:      Effort: Pulmonary effort is normal. No respiratory distress.   Abdominal:      Comments: Abdomen soft, non-distended  Tender to deep palpation to the right upper quadrant   Musculoskeletal:         General: Normal range of motion.   Skin:     General: Skin is warm and dry.   Neurological:      Mental Status: He is alert.            I have reviewed all pertinent lab results within the past 24 hours.  CBC:   Recent Labs   Lab 12/01/24  0838   WBC 15.38*   RBC 5.08   HGB 14.5   HCT 41.5      MCV 82   MCH 28.5   MCHC 34.9     CMP:    Recent Labs   Lab 12/01/24  0838   *   CALCIUM 9.5   ALBUMIN 4.3   PROT 7.4      K 3.6   CO2 22*      BUN 19   CREATININE 1.0   ALKPHOS 61   ALT 24   AST 22   BILITOT 0.3       Significant Diagnostics:  I have reviewed all pertinent imaging results/findings within the past 24 hours.

## 2024-12-01 NOTE — HPI
Josef Sage is a 48yoM with a history of hyperlipidemia who presents to the emergency department with complaints of severe epigastric pain.  The patient describes similar episodes but none this severe.  He endorses associated nausea and vomiting for the last 24 hours.  On arrival, the patient is tachycardic and hypertensive.  He continues to endorse nausea and severe epigastric abdominal pain.  Workup demonstrates an elevated leukocytosis and a CT scan that demonstrates a distended gallbladder with significant gallstone burden.  His pain is unrelieved despite multiple IV pain medication doses.  General surgery was consulted for evaluation.    At the time of my exam, the patient is resting in bed.  He continues to endorse abdominal discomfort, although improved with pain medications.  He has never had a history of intra-abdominal surgery.  He takes no blood thinning medications.  I discussed the plan of care with him and he is in agreement.

## 2024-12-01 NOTE — ED TRIAGE NOTES
Pt arriving to Ed c/o upper abd pain beginning 2am this morning with x1 episode of loose stool. X3 emesis episodes. Denies blood in stool and emesis. Reports eating sushi last night.

## 2024-12-01 NOTE — ED NOTES
Patient identifiers verified and correct for Josef Sage   LOC: The patient is aao x4  APPEARANCE: Patient appears uncomfortable but in no acute distress, pt is flailing in bed   SKIN: The skin is warm and dry, color consistent with ethnicity, patient has normal skin turgor and moist mucus membranes, skin intact  MUSCULOSKELETAL: Patient moving all extremities spontaneously, no swelling noted.  RESPIRATORY: Airway is open and patent, respirations are spontaneous, patient has a normal effort and rate, no accessory muscle use noted, pt placed on continuous pulse ox with O2 sats noted at 100% on room air.  CARDIAC: Pt placed on cardiac monitor. Patient has a normal rate, no edema noted, capillary refill < 3 seconds.   GASTRO: Soft and non tender to palpation, no distention noted. +upper abd pain, +N/V  : Pt denies any pain or frequency with urination.  NEURO: Pt opens eyes spontaneously, follows commands, facial expression symmetrical, bilateral hand grasp equal and even, purposeful motor response noted, normal sensation in all extremities when touched with a finger.

## 2024-12-01 NOTE — ASSESSMENT & PLAN NOTE
Josef Sage is a 48yoM who presents to the emergency department with right upper quadrant abdominal pain. His work-up is consistent with cholecystitis.     - admit to general surgery  - plan for laparoscopic cholecystectomy on 12/2  - ok for clears   - NPO at midnight  - IV zosyn  - PRN pain and anti-emetics  - please call with questions

## 2024-12-02 ENCOUNTER — PATIENT MESSAGE (OUTPATIENT)
Dept: SURGERY | Facility: HOSPITAL | Age: 49
End: 2024-12-02
Payer: COMMERCIAL

## 2024-12-02 ENCOUNTER — ANESTHESIA (OUTPATIENT)
Dept: SURGERY | Facility: HOSPITAL | Age: 49
End: 2024-12-02
Payer: COMMERCIAL

## 2024-12-02 PROBLEM — E80.6 HYPERBILIRUBINEMIA: Status: ACTIVE | Noted: 2024-12-02

## 2024-12-02 PROBLEM — E87.6 HYPOKALEMIA: Status: ACTIVE | Noted: 2024-12-02

## 2024-12-02 LAB
ALBUMIN SERPL BCP-MCNC: 3.8 G/DL (ref 3.5–5.2)
ALP SERPL-CCNC: 69 U/L (ref 40–150)
ALT SERPL W/O P-5'-P-CCNC: 284 U/L (ref 10–44)
ANION GAP SERPL CALC-SCNC: 8 MMOL/L (ref 8–16)
AST SERPL-CCNC: 187 U/L (ref 10–40)
BASOPHILS # BLD AUTO: 0.04 K/UL (ref 0–0.2)
BASOPHILS NFR BLD: 0.4 % (ref 0–1.9)
BILIRUB SERPL-MCNC: 1.3 MG/DL (ref 0.1–1)
BUN SERPL-MCNC: 15 MG/DL (ref 6–20)
CALCIUM SERPL-MCNC: 8.7 MG/DL (ref 8.7–10.5)
CHLORIDE SERPL-SCNC: 105 MMOL/L (ref 95–110)
CO2 SERPL-SCNC: 23 MMOL/L (ref 23–29)
CREAT SERPL-MCNC: 1 MG/DL (ref 0.5–1.4)
DIFFERENTIAL METHOD BLD: ABNORMAL
EOSINOPHIL # BLD AUTO: 0.1 K/UL (ref 0–0.5)
EOSINOPHIL NFR BLD: 0.8 % (ref 0–8)
ERYTHROCYTE [DISTWIDTH] IN BLOOD BY AUTOMATED COUNT: 12.5 % (ref 11.5–14.5)
EST. GFR  (NO RACE VARIABLE): >60 ML/MIN/1.73 M^2
GLUCOSE SERPL-MCNC: 107 MG/DL (ref 70–110)
HCT VFR BLD AUTO: 43.1 % (ref 40–54)
HGB BLD-MCNC: 14.9 G/DL (ref 14–18)
IMM GRANULOCYTES # BLD AUTO: 0.04 K/UL (ref 0–0.04)
IMM GRANULOCYTES NFR BLD AUTO: 0.4 % (ref 0–0.5)
LYMPHOCYTES # BLD AUTO: 1.7 K/UL (ref 1–4.8)
LYMPHOCYTES NFR BLD: 16.5 % (ref 18–48)
MCH RBC QN AUTO: 29.4 PG (ref 27–31)
MCHC RBC AUTO-ENTMCNC: 34.6 G/DL (ref 32–36)
MCV RBC AUTO: 85 FL (ref 82–98)
MONOCYTES # BLD AUTO: 0.9 K/UL (ref 0.3–1)
MONOCYTES NFR BLD: 8.9 % (ref 4–15)
NEUTROPHILS # BLD AUTO: 7.4 K/UL (ref 1.8–7.7)
NEUTROPHILS NFR BLD: 73 % (ref 38–73)
NRBC BLD-RTO: 0 /100 WBC
PLATELET # BLD AUTO: 254 K/UL (ref 150–450)
PMV BLD AUTO: 9.6 FL (ref 9.2–12.9)
POTASSIUM SERPL-SCNC: 3.3 MMOL/L (ref 3.5–5.1)
PROT SERPL-MCNC: 6.8 G/DL (ref 6–8.4)
RBC # BLD AUTO: 5.07 M/UL (ref 4.6–6.2)
SODIUM SERPL-SCNC: 136 MMOL/L (ref 136–145)
WBC # BLD AUTO: 10.19 K/UL (ref 3.9–12.7)

## 2024-12-02 PROCEDURE — 71000015 HC POSTOP RECOV 1ST HR: Performed by: SURGERY

## 2024-12-02 PROCEDURE — 96372 THER/PROPH/DIAG INJ SC/IM: CPT | Performed by: STUDENT IN AN ORGANIZED HEALTH CARE EDUCATION/TRAINING PROGRAM

## 2024-12-02 PROCEDURE — 25000003 PHARM REV CODE 250: Performed by: STUDENT IN AN ORGANIZED HEALTH CARE EDUCATION/TRAINING PROGRAM

## 2024-12-02 PROCEDURE — 36415 COLL VENOUS BLD VENIPUNCTURE: CPT | Performed by: STUDENT IN AN ORGANIZED HEALTH CARE EDUCATION/TRAINING PROGRAM

## 2024-12-02 PROCEDURE — 99900035 HC TECH TIME PER 15 MIN (STAT)

## 2024-12-02 PROCEDURE — 88304 TISSUE EXAM BY PATHOLOGIST: CPT | Performed by: PATHOLOGY

## 2024-12-02 PROCEDURE — 63600175 PHARM REV CODE 636 W HCPCS

## 2024-12-02 PROCEDURE — 25000003 PHARM REV CODE 250

## 2024-12-02 PROCEDURE — 37000008 HC ANESTHESIA 1ST 15 MINUTES: Performed by: SURGERY

## 2024-12-02 PROCEDURE — 99232 SBSQ HOSP IP/OBS MODERATE 35: CPT | Mod: 57,,, | Performed by: STUDENT IN AN ORGANIZED HEALTH CARE EDUCATION/TRAINING PROGRAM

## 2024-12-02 PROCEDURE — 63600175 PHARM REV CODE 636 W HCPCS: Performed by: NURSE ANESTHETIST, CERTIFIED REGISTERED

## 2024-12-02 PROCEDURE — C1887 CATHETER, GUIDING: HCPCS | Performed by: SURGERY

## 2024-12-02 PROCEDURE — 25000003 PHARM REV CODE 250: Performed by: NURSE ANESTHETIST, CERTIFIED REGISTERED

## 2024-12-02 PROCEDURE — 96361 HYDRATE IV INFUSION ADD-ON: CPT

## 2024-12-02 PROCEDURE — 96376 TX/PRO/DX INJ SAME DRUG ADON: CPT

## 2024-12-02 PROCEDURE — 47562 LAPAROSCOPIC CHOLECYSTECTOMY: CPT | Mod: ,,, | Performed by: SURGERY

## 2024-12-02 PROCEDURE — 37000009 HC ANESTHESIA EA ADD 15 MINS: Performed by: SURGERY

## 2024-12-02 PROCEDURE — 96366 THER/PROPH/DIAG IV INF ADDON: CPT

## 2024-12-02 PROCEDURE — 94799 UNLISTED PULMONARY SVC/PX: CPT

## 2024-12-02 PROCEDURE — 63600175 PHARM REV CODE 636 W HCPCS: Mod: JZ,JG | Performed by: SURGERY

## 2024-12-02 PROCEDURE — 71000033 HC RECOVERY, INTIAL HOUR: Performed by: SURGERY

## 2024-12-02 PROCEDURE — 25000003 PHARM REV CODE 250: Performed by: ANESTHESIOLOGY

## 2024-12-02 PROCEDURE — G0378 HOSPITAL OBSERVATION PER HR: HCPCS

## 2024-12-02 PROCEDURE — 63600175 PHARM REV CODE 636 W HCPCS: Performed by: STUDENT IN AN ORGANIZED HEALTH CARE EDUCATION/TRAINING PROGRAM

## 2024-12-02 PROCEDURE — 80053 COMPREHEN METABOLIC PANEL: CPT | Performed by: STUDENT IN AN ORGANIZED HEALTH CARE EDUCATION/TRAINING PROGRAM

## 2024-12-02 PROCEDURE — 27201423 OPTIME MED/SURG SUP & DEVICES STERILE SUPPLY: Performed by: SURGERY

## 2024-12-02 PROCEDURE — 36000708 HC OR TIME LEV III 1ST 15 MIN: Performed by: SURGERY

## 2024-12-02 PROCEDURE — 94761 N-INVAS EAR/PLS OXIMETRY MLT: CPT

## 2024-12-02 PROCEDURE — 85025 COMPLETE CBC W/AUTO DIFF WBC: CPT | Performed by: STUDENT IN AN ORGANIZED HEALTH CARE EDUCATION/TRAINING PROGRAM

## 2024-12-02 PROCEDURE — 88304 TISSUE EXAM BY PATHOLOGIST: CPT | Mod: 26,,, | Performed by: PATHOLOGY

## 2024-12-02 PROCEDURE — 25500020 PHARM REV CODE 255: Performed by: SURGERY

## 2024-12-02 PROCEDURE — 36000709 HC OR TIME LEV III EA ADD 15 MIN: Performed by: SURGERY

## 2024-12-02 PROCEDURE — 63600175 PHARM REV CODE 636 W HCPCS: Performed by: ANESTHESIOLOGY

## 2024-12-02 RX ORDER — BUPIVACAINE HYDROCHLORIDE 2.5 MG/ML
INJECTION, SOLUTION EPIDURAL; INFILTRATION; INTRACAUDAL
Status: DISCONTINUED | OUTPATIENT
Start: 2024-12-02 | End: 2024-12-02 | Stop reason: HOSPADM

## 2024-12-02 RX ORDER — ALBUTEROL SULFATE 90 UG/1
1 INHALANT RESPIRATORY (INHALATION) EVERY 6 HOURS PRN
Status: DISCONTINUED | OUTPATIENT
Start: 2024-12-02 | End: 2024-12-03 | Stop reason: HOSPADM

## 2024-12-02 RX ORDER — SODIUM CHLORIDE 0.9 % (FLUSH) 0.9 %
10 SYRINGE (ML) INJECTION
Status: DISCONTINUED | OUTPATIENT
Start: 2024-12-02 | End: 2024-12-02 | Stop reason: HOSPADM

## 2024-12-02 RX ORDER — FENTANYL CITRATE 50 UG/ML
INJECTION, SOLUTION INTRAMUSCULAR; INTRAVENOUS
Status: DISCONTINUED | OUTPATIENT
Start: 2024-12-02 | End: 2024-12-02

## 2024-12-02 RX ORDER — PHENYLEPHRINE HCL IN 0.9% NACL 1 MG/10 ML
SYRINGE (ML) INTRAVENOUS
Status: DISCONTINUED | OUTPATIENT
Start: 2024-12-02 | End: 2024-12-02

## 2024-12-02 RX ORDER — MIDAZOLAM HYDROCHLORIDE 1 MG/ML
INJECTION, SOLUTION INTRAMUSCULAR; INTRAVENOUS
Status: DISCONTINUED | OUTPATIENT
Start: 2024-12-02 | End: 2024-12-02

## 2024-12-02 RX ORDER — ONDANSETRON HYDROCHLORIDE 2 MG/ML
INJECTION, SOLUTION INTRAVENOUS
Status: DISCONTINUED | OUTPATIENT
Start: 2024-12-02 | End: 2024-12-02

## 2024-12-02 RX ORDER — HALOPERIDOL 5 MG/ML
0.5 INJECTION INTRAMUSCULAR EVERY 10 MIN PRN
Status: DISCONTINUED | OUTPATIENT
Start: 2024-12-02 | End: 2024-12-02 | Stop reason: HOSPADM

## 2024-12-02 RX ORDER — SIMETHICONE 80 MG
1 TABLET,CHEWABLE ORAL 3 TIMES DAILY PRN
Status: DISCONTINUED | OUTPATIENT
Start: 2024-12-02 | End: 2024-12-03 | Stop reason: HOSPADM

## 2024-12-02 RX ORDER — GLUCAGON 1 MG
1 KIT INJECTION
Status: DISCONTINUED | OUTPATIENT
Start: 2024-12-02 | End: 2024-12-02 | Stop reason: HOSPADM

## 2024-12-02 RX ORDER — OXYCODONE HYDROCHLORIDE 5 MG/1
5 TABLET ORAL EVERY 6 HOURS PRN
Status: DISCONTINUED | OUTPATIENT
Start: 2024-12-02 | End: 2024-12-02

## 2024-12-02 RX ORDER — GABAPENTIN 300 MG/1
300 CAPSULE ORAL 3 TIMES DAILY
Status: DISCONTINUED | OUTPATIENT
Start: 2024-12-02 | End: 2024-12-03 | Stop reason: HOSPADM

## 2024-12-02 RX ORDER — DEXAMETHASONE SODIUM PHOSPHATE 4 MG/ML
INJECTION, SOLUTION INTRA-ARTICULAR; INTRALESIONAL; INTRAMUSCULAR; INTRAVENOUS; SOFT TISSUE
Status: DISCONTINUED | OUTPATIENT
Start: 2024-12-02 | End: 2024-12-02

## 2024-12-02 RX ORDER — DEXMEDETOMIDINE HYDROCHLORIDE 100 UG/ML
INJECTION, SOLUTION INTRAVENOUS
Status: DISCONTINUED | OUTPATIENT
Start: 2024-12-02 | End: 2024-12-02

## 2024-12-02 RX ORDER — OXYCODONE HYDROCHLORIDE 5 MG/1
5 TABLET ORAL EVERY 4 HOURS PRN
Status: DISCONTINUED | OUTPATIENT
Start: 2024-12-02 | End: 2024-12-02

## 2024-12-02 RX ORDER — DIPHENHYDRAMINE HCL 25 MG
25 CAPSULE ORAL EVERY 6 HOURS PRN
Status: DISCONTINUED | OUTPATIENT
Start: 2024-12-02 | End: 2024-12-03 | Stop reason: HOSPADM

## 2024-12-02 RX ORDER — OXYCODONE HYDROCHLORIDE 5 MG/1
5 TABLET ORAL
Status: DISCONTINUED | OUTPATIENT
Start: 2024-12-02 | End: 2024-12-02 | Stop reason: HOSPADM

## 2024-12-02 RX ORDER — PROPOFOL 10 MG/ML
VIAL (ML) INTRAVENOUS
Status: DISCONTINUED | OUTPATIENT
Start: 2024-12-02 | End: 2024-12-02

## 2024-12-02 RX ORDER — OXYCODONE HYDROCHLORIDE 10 MG/1
10 TABLET ORAL EVERY 4 HOURS PRN
Status: DISCONTINUED | OUTPATIENT
Start: 2024-12-02 | End: 2024-12-03 | Stop reason: HOSPADM

## 2024-12-02 RX ORDER — IBUPROFEN 600 MG/1
600 TABLET ORAL EVERY 6 HOURS
Status: DISCONTINUED | OUTPATIENT
Start: 2024-12-02 | End: 2024-12-03 | Stop reason: HOSPADM

## 2024-12-02 RX ORDER — LIDOCAINE 50 MG/G
2 PATCH TOPICAL
Status: DISCONTINUED | OUTPATIENT
Start: 2024-12-02 | End: 2024-12-03 | Stop reason: HOSPADM

## 2024-12-02 RX ORDER — OXYCODONE HYDROCHLORIDE 5 MG/1
5 TABLET ORAL EVERY 4 HOURS PRN
Status: DISCONTINUED | OUTPATIENT
Start: 2024-12-02 | End: 2024-12-03 | Stop reason: HOSPADM

## 2024-12-02 RX ORDER — FENTANYL CITRATE 50 UG/ML
25 INJECTION, SOLUTION INTRAMUSCULAR; INTRAVENOUS EVERY 5 MIN PRN
Status: COMPLETED | OUTPATIENT
Start: 2024-12-02 | End: 2024-12-02

## 2024-12-02 RX ORDER — ACETAMINOPHEN 500 MG
1000 TABLET ORAL EVERY 8 HOURS
Status: DISCONTINUED | OUTPATIENT
Start: 2024-12-02 | End: 2024-12-03 | Stop reason: HOSPADM

## 2024-12-02 RX ORDER — METHOCARBAMOL 500 MG/1
500 TABLET, FILM COATED ORAL 4 TIMES DAILY
Status: DISCONTINUED | OUTPATIENT
Start: 2024-12-02 | End: 2024-12-03 | Stop reason: HOSPADM

## 2024-12-02 RX ORDER — ROCURONIUM BROMIDE 10 MG/ML
INJECTION, SOLUTION INTRAVENOUS
Status: DISCONTINUED | OUTPATIENT
Start: 2024-12-02 | End: 2024-12-02

## 2024-12-02 RX ORDER — LIDOCAINE HYDROCHLORIDE 20 MG/ML
INJECTION, SOLUTION EPIDURAL; INFILTRATION; INTRACAUDAL; PERINEURAL
Status: DISCONTINUED | OUTPATIENT
Start: 2024-12-02 | End: 2024-12-02

## 2024-12-02 RX ORDER — HYDROMORPHONE HYDROCHLORIDE 1 MG/ML
0.2 INJECTION, SOLUTION INTRAMUSCULAR; INTRAVENOUS; SUBCUTANEOUS ONCE
Status: COMPLETED | OUTPATIENT
Start: 2024-12-02 | End: 2024-12-02

## 2024-12-02 RX ORDER — POTASSIUM CHLORIDE 20 MEQ/1
40 TABLET, EXTENDED RELEASE ORAL ONCE
Status: COMPLETED | OUTPATIENT
Start: 2024-12-02 | End: 2024-12-02

## 2024-12-02 RX ADMIN — SUGAMMADEX 200 MG: 100 INJECTION, SOLUTION INTRAVENOUS at 03:12

## 2024-12-02 RX ADMIN — Medication 200 MCG: at 03:12

## 2024-12-02 RX ADMIN — Medication 100 MCG: at 02:12

## 2024-12-02 RX ADMIN — DEXAMETHASONE SODIUM PHOSPHATE 4 MG: 4 INJECTION INTRA-ARTICULAR; INTRALESIONAL; INTRAMUSCULAR; INTRAVENOUS; SOFT TISSUE at 01:12

## 2024-12-02 RX ADMIN — SODIUM CHLORIDE: 0.9 INJECTION, SOLUTION INTRAVENOUS at 02:12

## 2024-12-02 RX ADMIN — OXYCODONE HYDROCHLORIDE 5 MG: 5 TABLET ORAL at 06:12

## 2024-12-02 RX ADMIN — FENTANYL CITRATE 25 MCG: 50 INJECTION, SOLUTION INTRAMUSCULAR; INTRAVENOUS at 04:12

## 2024-12-02 RX ADMIN — ACETAMINOPHEN 1000 MG: 500 TABLET ORAL at 07:12

## 2024-12-02 RX ADMIN — GABAPENTIN 300 MG: 300 CAPSULE ORAL at 08:12

## 2024-12-02 RX ADMIN — MIDAZOLAM HYDROCHLORIDE 2 MG: 2 INJECTION, SOLUTION INTRAMUSCULAR; INTRAVENOUS at 01:12

## 2024-12-02 RX ADMIN — HYDROMORPHONE HYDROCHLORIDE 0.2 MG: 1 INJECTION, SOLUTION INTRAMUSCULAR; INTRAVENOUS; SUBCUTANEOUS at 07:12

## 2024-12-02 RX ADMIN — HYDROMORPHONE HYDROCHLORIDE 0.5 MG: 1 INJECTION, SOLUTION INTRAMUSCULAR; INTRAVENOUS; SUBCUTANEOUS at 12:12

## 2024-12-02 RX ADMIN — PIPERACILLIN SODIUM AND TAZOBACTAM SODIUM 4.5 G: 4; .5 INJECTION, POWDER, FOR SOLUTION INTRAVENOUS at 12:12

## 2024-12-02 RX ADMIN — SODIUM CHLORIDE, POTASSIUM CHLORIDE, SODIUM LACTATE AND CALCIUM CHLORIDE: 600; 310; 30; 20 INJECTION, SOLUTION INTRAVENOUS at 12:12

## 2024-12-02 RX ADMIN — OXYCODONE HYDROCHLORIDE 10 MG: 10 TABLET ORAL at 09:12

## 2024-12-02 RX ADMIN — ONDANSETRON 8 MG: 8 TABLET, ORALLY DISINTEGRATING ORAL at 04:12

## 2024-12-02 RX ADMIN — DIPHENHYDRAMINE HYDROCHLORIDE 25 MG: 25 CAPSULE ORAL at 09:12

## 2024-12-02 RX ADMIN — FENTANYL CITRATE 100 MCG: 50 INJECTION, SOLUTION INTRAMUSCULAR; INTRAVENOUS at 01:12

## 2024-12-02 RX ADMIN — HYDROMORPHONE HYDROCHLORIDE 0.5 MG: 1 INJECTION, SOLUTION INTRAMUSCULAR; INTRAVENOUS; SUBCUTANEOUS at 08:12

## 2024-12-02 RX ADMIN — DEXMEDETOMIDINE 8 MCG: 200 INJECTION, SOLUTION INTRAVENOUS at 03:12

## 2024-12-02 RX ADMIN — OXYCODONE 5 MG: 5 TABLET ORAL at 04:12

## 2024-12-02 RX ADMIN — POTASSIUM CHLORIDE 40 MEQ: 1500 TABLET, EXTENDED RELEASE ORAL at 08:12

## 2024-12-02 RX ADMIN — FENTANYL CITRATE 100 MCG: 50 INJECTION, SOLUTION INTRAMUSCULAR; INTRAVENOUS at 03:12

## 2024-12-02 RX ADMIN — ROCURONIUM BROMIDE 20 MG: 10 INJECTION, SOLUTION INTRAVENOUS at 02:12

## 2024-12-02 RX ADMIN — ENOXAPARIN SODIUM 40 MG: 40 INJECTION SUBCUTANEOUS at 05:12

## 2024-12-02 RX ADMIN — SODIUM CHLORIDE: 0.9 INJECTION, SOLUTION INTRAVENOUS at 01:12

## 2024-12-02 RX ADMIN — LIDOCAINE 2 PATCH: 50 PATCH TOPICAL at 08:12

## 2024-12-02 RX ADMIN — ONDANSETRON 4 MG: 2 INJECTION INTRAMUSCULAR; INTRAVENOUS at 03:12

## 2024-12-02 RX ADMIN — PIPERACILLIN SODIUM AND TAZOBACTAM SODIUM 4.5 G: 4; .5 INJECTION, POWDER, FOR SOLUTION INTRAVENOUS at 08:12

## 2024-12-02 RX ADMIN — LIDOCAINE HYDROCHLORIDE 100 MG: 20 INJECTION, SOLUTION EPIDURAL; INFILTRATION; INTRACAUDAL; PERINEURAL at 01:12

## 2024-12-02 RX ADMIN — PROPOFOL 150 MG: 10 INJECTION, EMULSION INTRAVENOUS at 01:12

## 2024-12-02 RX ADMIN — ROCURONIUM BROMIDE 30 MG: 10 INJECTION, SOLUTION INTRAVENOUS at 01:12

## 2024-12-02 RX ADMIN — METHOCARBAMOL 500 MG: 500 TABLET ORAL at 08:12

## 2024-12-02 RX ADMIN — IBUPROFEN 600 MG: 600 TABLET, FILM COATED ORAL at 08:12

## 2024-12-02 RX ADMIN — HYDROMORPHONE HYDROCHLORIDE 0.5 MG: 1 INJECTION, SOLUTION INTRAMUSCULAR; INTRAVENOUS; SUBCUTANEOUS at 04:12

## 2024-12-02 RX ADMIN — ROCURONIUM BROMIDE 50 MG: 10 INJECTION, SOLUTION INTRAVENOUS at 01:12

## 2024-12-02 NOTE — TRANSFER OF CARE
"Anesthesia Transfer of Care Note    Patient: Josef Sage    Procedure(s) Performed: Procedure(s) (LRB):  CHOLECYSTECTOMY, LAPAROSCOPIC (N/A)    Patient location: PACU    Anesthesia Type: general    Transport from OR: Transported from OR on 6-10 L/min O2 by face mask with adequate spontaneous ventilation    Post pain: adequate analgesia    Post assessment: no apparent anesthetic complications    Post vital signs: stable    Level of consciousness: awake    Nausea/Vomiting: no nausea/vomiting    Complications: none    Transfer of care protocol was followed      Last vitals: Visit Vitals  BP (!) 104/56   Pulse 81   Temp 36.6 °C (97.9 °F) (Temporal)   Resp 16   Ht 5' 11" (1.803 m)   Wt 77.1 kg (170 lb)   SpO2 96%   BMI 23.71 kg/m²     "

## 2024-12-02 NOTE — ANESTHESIA PREPROCEDURE EVALUATION
[facility]  Anesthesia Pre-Operative Evaluation         Patient Name: Josef Sage  YOB: 1975  MRN: [id number]    SUBJECTIVE:     Pre-operative evaluation for Procedure(s) (LRB):  CHOLECYSTECTOMY, LAPAROSCOPIC (N/A)     12/01/2024    Josef Sage is a 48 y.o. male w/ a significant PMHx of asthma, anxiety, cervical spondylosis s/p C5-6 ACDF. He presents with cholelithiasis with acute cholecystitis    Patient now presents for the above procedure(s).      LDA:        Peripheral IV - Single Lumen 12/01/24 0837 20 G Left Antecubital (Active)   Site Assessment Clean;Dry;Intact;No redness;No swelling 12/01/24 2323   Extremity Assessment Distal to IV No abnormal discoloration 12/01/24 2017   Line Status Infusing 12/01/24 2323   Dressing Status Clean;Dry;Intact 12/01/24 2323   Dressing Intervention Integrity maintained 12/01/24 2323   Reason Not Rotated Not due 12/01/24 2017   Number of days: 0       Prev airway:   Intubation     Date/Time: 5/10/2023 8:19 AM  Performed by: Rachel Alonso CRNA  Authorized by: Abhay Patel Jr., MD      Intubation:     Induction:  Intravenous    Intubated:  Postinduction    Mask Ventilation:  Easy mask    Attempts:  1    Attempted By:  CRNA    Method of Intubation:  Video laryngoscopy    Blade:  Sumner 3    Laryngeal View Grade: Grade I - full view of cords      Difficult Airway Encountered?: No      Complications:  None    Airway Device:  Oral endotracheal tube    Airway Device Size:  7.5    Style/Cuff Inflation:  Cuffed (inflated to minimal occlusive pressure)    Tube secured:  21    Secured at:  The lips    Placement Verified By:  Capnometry    Complicating Factors:  None    Findings Post-Intubation:  BS equal bilateral and atraumatic/condition of teeth unchanged    Drips:    [START ON 12/2/2024] lactated ringers   Intravenous Continuous           Patient Active Problem List   Diagnosis    Fatigue    Vitamin B12 deficiency    Low libido    Alopecia     Mixed hyperlipidemia    Pre-op evaluation    Snoring    Cervical spondylosis with radiculopathy    Mild intermittent asthma without complication    S/P cervical spinal fusion    Mixed anxiety and depressive disorder    Cholelithiasis with acute cholecystitis       Review of patient's allergies indicates:   Allergen Reactions    Sulfa (sulfonamide antibiotics) Rash       Current Inpatient Medications:   enoxparin  40 mg Subcutaneous Daily    piperacillin-tazobactam (Zosyn) IV (PEDS and ADULTS) (extended infusion is not appropriate)  4.5 g Intravenous Q8H       No current facility-administered medications on file prior to encounter.     Current Outpatient Medications on File Prior to Encounter   Medication Sig Dispense Refill    albuterol (PROVENTIL/VENTOLIN HFA) 90 mcg/actuation inhaler Inhale 1-2 puffs into the lungs every 6 (six) hours as needed for Wheezing. 6.7 g 1    EScitalopram oxalate (LEXAPRO) 10 MG tablet Take 1 tablet (10 mg total) by mouth every evening. 90 tablet 3    fluticasone propionate (FLONASE) 50 mcg/actuation nasal spray 2 sprays (100 mcg total) by Each Nostril route 2 (two) times daily. 16 g 11    multivitamin capsule Take 1 capsule by mouth once daily.      promethazine (PHENERGAN) 25 MG suppository Place 1 suppository (25 mg total) rectally every 6 (six) hours as needed for Nausea. 12 suppository 0    PROPECIA 1 mg tablet Take 1 tablet (1 mg total) by mouth once daily. 30 tablet 2    rosuvastatin (CRESTOR) 20 MG tablet Take 1 tablet (20 mg total) by mouth once daily. 90 tablet 3       Past Surgical History:   Procedure Laterality Date    ANTERIOR CERVICAL DISCECTOMY W/ FUSION N/A 05/10/2023    Procedure: C5-6 ACDF;  Surgeon: Ivan Dalton MD;  Location: [facility] OR [address] [place] [address];  Service: Neurosurgery;  Laterality: N/A;  C5-6 ACDF  anesthesia: general  nerve mon: EMG,SEP, MEP  radiology; C-ARM  bed: regular slider  headrest: MINDI eddy/hanging weights/ surgical pillow  misc; interbody paddle  distractor, nerve root retractor (depuy)    MOUTH SURGERY      wisdom teeth    None         Social History     Socioeconomic History    Marital status: Single    Number of children: 0   Occupational History    Occupation: Internal medicine physician      Comment: Academ[employer]   Tobacco Use    Smoking status: Never    Smokeless tobacco: Never   Substance and Sexual Activity    Alcohol use: Not Currently     Alcohol/week: 1.0 standard drink of alcohol     Types: 1 Glasses of wine per week    Drug use: No     Comment: CBD gummies    Sexual activity: Yes     Partners: Female     Comment: Not    Social History Narrative    Finance     Social Drivers of Health     Financial Resource Strain: Low Risk  (12/1/2024)    Overall Financial Resource Strain (CARDIA)     Difficulty of Paying Living Expenses: Not hard at all   Food Insecurity: No Food Insecurity (12/1/2024)    Hunger Vital Sign     Worried About Running Out of Food in the Last Year: Never true     Ran Out of Food in the Last Year: Never true   Transportation Needs: No Transportation Needs (12/1/2024)    TRANSPORTATION NEEDS     Transportation : No   Stress: No Stress Concern Present (12/1/2024)    [employer] [place] [employer] - Occupational Stress Questionnaire     Feeling of Stress : Not at all   Housing Stability: Low Risk  (12/1/2024)    Housing Stability Vital Sign     Unable to Pay for Housing in the Last Year: No     Homeless in the Last Year: No       OBJECTIVE:     Vital Signs Range (Last 24H):  Temp:  [36.3 °C (97.4 °F)-36.8 °C (98.3 °F)]   Pulse:  []   Resp:  [16-20]   BP: (123-207)/()   SpO2:  [95 %-100 %]       Significant Labs:  Lab Results   Component Value Date    WBC 15.38 (H) 12/01/2024    HGB 14.5 12/01/2024    HCT 41.5 12/01/2024     12/01/2024    CHOL 300 (H) 10/03/2024    TRIG 165 (H) 10/03/2024    HDL 44 10/03/2024    ALT 24 12/01/2024    AST 22 12/01/2024     12/01/2024    K 3.6 12/01/2024      12/01/2024    CREATININE 1.0 12/01/2024    BUN 19 12/01/2024    CO2 22 (L) 12/01/2024    TSH 1.366 01/05/2024    INR 0.9 05/05/2023    HGBA1C 5.0 01/05/2024       Diagnostic Studies: No relevant studies.    EKG:   Results for orders placed or performed during the hospital encounter of 12/01/24   EKG 12-lead    Collection Time: 12/01/24  8:03 AM   Result Value Ref Range    QRS Duration 80 ms    OHS QTC Calculation 420 ms    Narrative    Test Reason : R00.0,    Vent. Rate :  90 BPM     Atrial Rate :  90 BPM     P-R Int : 146 ms          QRS Dur :  80 ms      QT Int : 344 ms       P-R-T Axes :  45  56  47 degrees    QTcB Int : 420 ms    Normal sinus rhythm  Normal ECG  When compared with ECG of 07-Dec-2023 09:24,  Nonspecific T wave abnormality now evident in Anterior leads  Confirmed by Scotty Parker ([id number]) on 12/1/2024 10:07:11 AM    Referred By: AAAREFERRAL SELF           Confirmed By: Scotty Parker       2D ECHO:  TTE:  No results found for this or any previous visit.    DENISE:  No results found for this or any previous visit.    ASSESSMENT/PLAN:           Pre-op Assessment    I have reviewed the Patient Summary Reports.     I have reviewed the Nursing Notes. I have reviewed the NPO Status.   I have reviewed the Medications.     Review of Systems  Anesthesia Hx:  No problems with previous Anesthesia   History of prior surgery of interest to airway management or planning:          Denies Family Hx of Anesthesia complications.    Denies Personal Hx of Anesthesia complications.                    Hematology/Oncology:  Hematology Normal                                     EENT/Dental:  EENT/Dental Normal           Cardiovascular:                hyperlipidemia                               Pulmonary:    Asthma                    Renal/:  Renal/ Normal                 Hepatic/GI:  Hepatic/GI Normal                    Musculoskeletal:         Spine Disorders: (s/p C5-6 ACDF)             Neurological:    Neuromuscular  Disease,                                   Psych:   anxiety depression                Physical Exam  General: Well nourished, Cooperative, Alert and Oriented    Airway:  Mallampati: II   Mouth Opening: Normal  TM Distance: Normal  Tongue: Normal    Dental:  Intact    Chest/Lungs:  Normal Respiratory Rate    Heart:  Rate: Normal        Anesthesia Plan  Type of Anesthesia, risks & benefits discussed:    Anesthesia Type: Gen ETT  Intra-op Monitoring Plan: Standard ASA Monitors  Post Op Pain Control Plan: multimodal analgesia and IV/PO Opioids PRN  Induction:  IV  Airway Plan: Direct and Video, Post-Induction  Informed Consent: Informed consent signed with the Patient and all parties understand the risks and agree with anesthesia plan.  All questions answered.   ASA Score: 2  Day of Surgery Review of History & Physical: H&P Update referred to the surgeon/provider.    Ready For Surgery From Anesthesia Perspective.     .

## 2024-12-02 NOTE — PROGRESS NOTES
[facility] - [facility]  General Surgery  Progress Note    Subjective:     History of Present Illness:  Josef Sage is a 48yoM with a history of hyperlipidemia who presents to the emergency department with complaints of severe epigastric pain.  The patient describes similar episodes but none this severe.  He endorses associated nausea and vomiting for the last 24 hours.  On arrival, the patient is tachycardic and hypertensive.  He continues to endorse nausea and severe epigastric abdominal pain.  Workup demonstrates an elevated leukocytosis and a CT scan that demonstrates a distended gallbladder with significant gallstone burden.  His pain is unrelieved despite multiple IV pain medication doses.  General surgery was consulted for evaluation.    At the time of my exam, the patient is resting in bed.  He continues to endorse abdominal discomfort, although improved with pain medications.  He has never had a history of intra-abdominal surgery.  He takes no blood thinning medications.  I discussed the plan of care with him and he is in agreement.    Post-Op Info:  Procedure(s) (LRB):  CHOLECYSTECTOMY, LAPAROSCOPIC (N/A)   Day of Surgery     Interval History: NAEON. Afebrile. HDS. Reports feeling better this morning. Denies n/v. Pain is controlled with current regimen. No new symptoms or concerns this morning. All questions answered.   WBC 15 > 10  T bili 0.3 > 1.3    Medications:  Continuous Infusions:   lactated ringers   Intravenous Continuous 75 mL/hr at 12/02/24 0005 New Bag at 12/02/24 0005     Scheduled Meds:   enoxparin  40 mg Subcutaneous Daily    piperacillin-tazobactam (Zosyn) IV (PEDS and ADULTS) (extended infusion is not appropriate)  4.5 g Intravenous Q8H     PRN Meds:  Current Facility-Administered Medications:     acetaminophen, 650 mg, Oral, Q8H PRN    HYDROmorphone, 0.5 mg, Intravenous, Q4H PRN    ondansetron, 8 mg, Oral, Q8H PRN     Review of patient's allergies indicates:   Allergen Reactions    Sulfa  (sulfonamide antibiotics) Rash     Objective:     Vital Signs (Most Recent):  Temp: 97.6 °F (36.4 °C) (12/02/24 0550)  Pulse: 85 (12/02/24 0550)  Resp: 18 (12/02/24 0550)  BP: 132/68 (12/02/24 0550)  SpO2: 95 % (12/02/24 0550) Vital Signs (24h Range):  Temp:  [97.4 °F (36.3 °C)-98.6 °F (37 °C)] 97.6 °F (36.4 °C)  Pulse:  [] 85  Resp:  [16-20] 18  SpO2:  [95 %-100 %] 95 %  BP: (123-165)/(68-84) 132/68     Weight: 77.1 kg (170 lb)  Body mass index is 23.71 kg/m².    Intake/Output - Last 3 Shifts         11/30 0700  12/01 0659 12/01 0700 12/02 0659 12/02 0700  12/03 0659    P.O.  0     I.V. (mL/kg)  463.8 (6)     IV Piggyback  200     Total Intake(mL/kg)  663.8 (8.6)     Net  +663.8            Urine Occurrence  3 x     Stool Occurrence  0 x     Emesis Occurrence  0 x              Physical Exam  Vitals and nursing note reviewed.   Constitutional:       General: He is not in acute distress.     Appearance: He is not ill-appearing or diaphoretic.      Comments: Room air   HENT:      Head: Normocephalic and atraumatic.      Mouth/Throat:      Mouth: Mucous membranes are moist.      Pharynx: Oropharynx is clear.   Eyes:      Extraocular Movements: Extraocular movements intact.      Conjunctiva/sclera: Conjunctivae normal.   Cardiovascular:      Rate and Rhythm: Normal rate.   Pulmonary:      Effort: Pulmonary effort is normal. No respiratory distress.   Abdominal:      General: There is no distension.      Palpations: Abdomen is soft.      Tenderness: There is no guarding or rebound.      Comments: TTP in RUQ. No peritonitic signs    Musculoskeletal:         General: No deformity.   Skin:     General: Skin is warm and dry.      Coloration: Skin is not jaundiced.   Neurological:      Mental Status: He is alert and oriented to person, place, and time.          Significant Labs:  I have reviewed all pertinent lab results within the past 24 hours.    Significant Diagnostics:  I have reviewed all pertinent imaging  results/findings within the past 24 hours.  Assessment/Plan:     * Cholelithiasis with acute cholecystitis  Josef Sage is a 48yoM who presents to the emergency department with right upper quadrant abdominal pain. His work-up is consistent with cholecystitis, now with hyperbilirubinemia, no ductal dilation on US 12/1    - NPO  - To OR today for lap cholecystectomy. Will plan on IOC given increase in T bili today   - Informed consent obtained   - IVF  - Continue antibiotics (zosyn)  - PRN pain and nausea medications  - Daily labs  - Replete electrolytes PRN  - DVT prophylaxis (SCDs and lovenox)  - OOB, ambulate  - Home meds reviewed and reconciled. Continue home albuterol PRN. Restart other home meds as able     Dispo: not yet medically stable for dc       Hypokalemia  - Replete PRN  - Continue to monitor with daily CMP    Case discussed with Dr. James.      Casey Elder, PA-C  General Surgery  [facility]

## 2024-12-02 NOTE — ANESTHESIA PROCEDURE NOTES
Intubation    Date/Time: 12/2/2024 1:35 PM    Performed by: Gina Robb CRNA  Authorized by: Obdulio Maldonado Jr., MD    Intubation:     Induction:  Intravenous    Intubated:  Postinduction    Mask Ventilation:  Easy mask    Attempts:  1    Attempted By:  CRNA    Method of Intubation:  Video laryngoscopy    Blade:  Sumner 3    Laryngeal View Grade: Grade I - full view of cords      Difficult Airway Encountered?: No      Complications:  None    Airway Device:  Oral endotracheal tube    Airway Device Size:  7.5    Style/Cuff Inflation:  Cuffed (inflated to minimal occlusive pressure)    Tube secured:  22    Secured at:  The teeth    Placement Verified By:  Capnometry    Complicating Factors:  None    Findings Post-Intubation:  BS equal bilateral and atraumatic/condition of teeth unchanged

## 2024-12-02 NOTE — BRIEF OP NOTE
[facility] - Surgery ([facility])  Brief Operative Note    SUMMARY     Surgery Date: 12/2/2024     Surgeons and Role:     * Sahil James MD - Primary     * Dixie Ansari MD - Resident - Assisting     * Swapna Matias MD - Resident - Assisting        Pre-op Diagnosis:  Cholecystitis [K81.9]    Post-op Diagnosis:  Post-Op Diagnosis Codes:     * Cholecystitis [K81.9]    Procedure(s) (LRB):  CHOLECYSTECTOMY, LAPAROSCOPIC (N/A)    Anesthesia: General    Implants:  * No implants in log *    Operative Findings: Acute cholecystitis. Laparoscopic cholecystectomy performed.    Estimated Blood Loss: 15 cc             Specimens:   Specimen (24h ago, onward)       Start     Ordered    12/02/24 1519  Specimen to Pathology, Surgery General Surgery  Once        Comments: Pre-op Diagnosis: Cholecystitis [K81.9]Procedure(s):CHOLECYSTECTOMY, LAPAROSCOPIC Number of specimens: 1Name of specimens: 1. Gallbladder-perm     References:    Click here for ordering Quick Tip   Question Answer Comment   Procedure Type: General Surgery    Specimen Class: Routine/Screening    Release to patient Immediate        12/02/24 1518                    [id number]

## 2024-12-02 NOTE — PLAN OF CARE
[address] [facility]  Initial Discharge Assessment       Primary Care Provider: Ambreen Prather MD    Admission Diagnosis: Cholecystitis [K81.9]  Tachycardia [R00.0]  Right upper quadrant abdominal pain [R10.11]    Admission Date: 12/1/2024  Expected Discharge Date: 12/3/2024    Transition of Care Barriers: None    Payor: BLUE CROSS BLUE SHIELD / Plan: BLUE CONNECT / Product Type: HMO /     Extended Emergency Contact Information  Primary Emergency Contact: Sagar Sage  Address: [address] [place] [address] [place]  Mobile Phone: [phone]  Relation: Mother   needed? No  Secondary Emergency Contact: Luke Sage   [place]  Mobile Phone: [phone]  Relation: Sister    Discharge Plan A: Home with family  Discharge Plan B: Home Health      [facility] [address] [place] [address] [address] [address] On license of [facility]  [address] [facility] [address]  Phone: [phone] Fax: [phone]      Initial Assessment (most recent)       Adult Discharge Assessment - 12/02/24 1141          Discharge Assessment    Assessment Type Discharge Planning Assessment     Confirmed/corrected address, phone number and insurance Yes     Confirmed Demographics Correct on Facesheet     Source of Information patient;family     Does patient/caregiver understand observation status Yes     Communicated LUISANA with patient/caregiver Yes     Reason For Admission Abdominal Pain     People in Home parent(s)     Facility Arrived From: Home     Do you expect to return to your current living situation? Yes     Do you have help at home or someone to help you manage your care at home? Yes     Who are your caregiver(s) and their phone number(s)? Sagar Sage, mother, [phone]     Prior to hospitilization cognitive status: Alert/Oriented;No Deficits     Current cognitive status: No Deficits;Alert/Oriented     Walking or Climbing Stairs Difficulty no     Dressing/Bathing Difficulty no     Home  Accessibility wheelchair accessible     Home Layout Able to live on 1st floor     Equipment Currently Used at Home none     Readmission within 30 days? No     Patient currently being followed by outpatient case management? No     Do you currently have service(s) that help you manage your care at home? No     Do you take prescription medications? Yes     Do you have prescription coverage? Yes     Do you have any problems affording any of your prescribed medications? No     Is the patient taking medications as prescribed? yes     Who is going to help you get home at discharge? Sagar Mother     How do you get to doctors appointments? car, drives self     Are you on dialysis? No     Do you take coumadin? No     Discharge Plan A Home with family     Discharge Plan B Home Health     DME Needed Upon Discharge  none     Discharge Plan discussed with: Patient;Parent(s)     Name(s) and Number(s) Sagar Sage [phone] Mother     Transition of Care Barriers None        Physical Activity    On average, how many minutes do you engage in exercise at this level? 20 min        Financial Resource Strain    How hard is it for you to pay for the very basics like food, housing, medical care, and heating? Not hard at all        Housing Stability    In the last 12 months, was there a time when you were not able to pay the mortgage or rent on time? No     At any time in the past 12 months, were you homeless or living in a shelter (including now)? No        Transportation Needs    Has the lack of transportation kept you from medical appointments, meetings, work or from getting things needed for daily living? No        Food Insecurity    Within the past 12 months, you worried that your food would run out before you got the money to buy more. Never true     Within the past 12 months, the food you bought just didn't last and you didn't have money to get more. Never true        Stress    Do you feel stress - tense, restless, nervous, or  anxious, or unable to sleep at night because your mind is troubled all the time - these days? Not at all        Social Isolation    How often do you feel lonely or isolated from those around you?  Never        Alcohol Use    Q1: How often do you have a drink containing alcohol? Never     Q2: How many drinks containing alcohol do you have on a typical day when you are drinking? Patient does not drink     Q3: How often do you have six or more drinks on one occasion? Never        Utilities    In the past 12 months has the electric, gas, oil, or water company threatened to shut off services in your home? No        Health Literacy    How often do you need to have someone help you when you read instructions, pamphlets, or other written material from your doctor or pharmacy? Rarely        OTHER    Name(s) of People in Home Sagar Sage mother [phone]                   Discharge Plan A and Plan B have been determined by review of patient's clinical status, future medical and therapeutic needs, and coverage/benefits for post-acute care in coordination with multidisciplinary team members.     Sw met with Pt and mother in the room, Pt lives with her, verified PCP and address information. Pt was still driving, not using any DME at home nor any home health services. Pt's BC/BS pays for his dc medications and he is not on HD. Mother will have a ride home when Pt is stable. No needs, will follow.

## 2024-12-02 NOTE — SUBJECTIVE & OBJECTIVE
Interval History: NAEON. Afebrile. HDS. Reports feeling better this morning. Denies n/v. Pain is controlled with current regimen. No new symptoms or concerns this morning. All questions answered.   WBC 15 > 10  T bili 0.3 > 1.3    Medications:  Continuous Infusions:   lactated ringers   Intravenous Continuous 75 mL/hr at 12/02/24 0005 New Bag at 12/02/24 0005     Scheduled Meds:   enoxparin  40 mg Subcutaneous Daily    piperacillin-tazobactam (Zosyn) IV (PEDS and ADULTS) (extended infusion is not appropriate)  4.5 g Intravenous Q8H     PRN Meds:  Current Facility-Administered Medications:     acetaminophen, 650 mg, Oral, Q8H PRN    HYDROmorphone, 0.5 mg, Intravenous, Q4H PRN    ondansetron, 8 mg, Oral, Q8H PRN     Review of patient's allergies indicates:   Allergen Reactions    Sulfa (sulfonamide antibiotics) Rash     Objective:     Vital Signs (Most Recent):  Temp: 97.6 °F (36.4 °C) (12/02/24 0550)  Pulse: 85 (12/02/24 0550)  Resp: 18 (12/02/24 0550)  BP: 132/68 (12/02/24 0550)  SpO2: 95 % (12/02/24 0550) Vital Signs (24h Range):  Temp:  [97.4 °F (36.3 °C)-98.6 °F (37 °C)] 97.6 °F (36.4 °C)  Pulse:  [] 85  Resp:  [16-20] 18  SpO2:  [95 %-100 %] 95 %  BP: (123-165)/(68-84) 132/68     Weight: 77.1 kg (170 lb)  Body mass index is 23.71 kg/m².    Intake/Output - Last 3 Shifts         11/30 0700  12/01 0659 12/01 0700 12/02 0659 12/02 0700 12/03 0659    P.O.  0     I.V. (mL/kg)  463.8 (6)     IV Piggyback  200     Total Intake(mL/kg)  663.8 (8.6)     Net  +663.8            Urine Occurrence  3 x     Stool Occurrence  0 x     Emesis Occurrence  0 x              Physical Exam  Vitals and nursing note reviewed.   Constitutional:       General: He is not in acute distress.     Appearance: He is not ill-appearing or diaphoretic.      Comments: Room air   HENT:      Head: Normocephalic and atraumatic.      Mouth/Throat:      Mouth: Mucous membranes are moist.      Pharynx: Oropharynx is clear.   Eyes:      Extraocular  Movements: Extraocular movements intact.      Conjunctiva/sclera: Conjunctivae normal.   Cardiovascular:      Rate and Rhythm: Normal rate.   Pulmonary:      Effort: Pulmonary effort is normal. No respiratory distress.   Abdominal:      General: There is no distension.      Palpations: Abdomen is soft.      Tenderness: There is no guarding or rebound.      Comments: TTP in RUQ. No peritonitic signs    Musculoskeletal:         General: No deformity.   Skin:     General: Skin is warm and dry.      Coloration: Skin is not jaundiced.   Neurological:      Mental Status: He is alert and oriented to person, place, and time.          Significant Labs:  I have reviewed all pertinent lab results within the past 24 hours.    Significant Diagnostics:  I have reviewed all pertinent imaging results/findings within the past 24 hours.

## 2024-12-02 NOTE — ANESTHESIA POSTPROCEDURE EVALUATION
Anesthesia Post Evaluation    Patient: Josef Sage    Procedure(s) Performed: Procedure(s) (LRB):  CHOLECYSTECTOMY, LAPAROSCOPIC (N/A)    Final Anesthesia Type: general      Patient location during evaluation: PACU  Patient participation: Yes- Able to Participate  Level of consciousness: awake and alert  Post-procedure vital signs: reviewed and stable  Pain management: adequate  Airway patency: patent    PONV status at discharge: No PONV  Anesthetic complications: no      Cardiovascular status: blood pressure returned to baseline  Respiratory status: spontaneous ventilation and room air  Hydration status: euvolemic  Follow-up not needed.              Vitals Value Taken Time   /80 12/02/24 1725   Temp 36.6 °C (97.9 °F) 12/02/24 1608   Pulse 92 12/02/24 1725   Resp 18 12/02/24 1725   SpO2 97 % 12/02/24 1725         Event Time   Out of Recovery 12/02/2024 16:30:00         Pain/Ximena Score: Pain Rating Prior to Med Admin: 10 (12/2/2024  4:54 PM)  Pain Rating Post Med Admin: 6 (12/2/2024  9:03 AM)  Ximena Score: 9 (12/2/2024  4:30 PM)

## 2024-12-02 NOTE — PROGRESS NOTES
..Nursing Transfer Note      Reason patient is being transferred: procedure care complete     Transfer To: 1049    Transfer via stretcher    Transfer with n/a    Transported by PCT    Medicines sent: n/a    Any special needs or follow-up needed: routine    Chart send with patient: Yes    Notified: sister    Patient reassessed at: 2441 12/2/24 (date, time)

## 2024-12-02 NOTE — PLAN OF CARE
Problem: Wound  Goal: Optimal Coping  Outcome: Progressing     Problem: Adult Inpatient Plan of Care  Goal: Plan of Care Review  Outcome: Progressing

## 2024-12-02 NOTE — ASSESSMENT & PLAN NOTE
Josef Sage is a 48yoM who presents to the emergency department with right upper quadrant abdominal pain. His work-up is consistent with cholecystitis, now with hyperbilirubinemia, no ductal dilation on US 12/1    - NPO  - To OR today for lap cholecystectomy. Will plan on IOC given increase in T bili today   - Informed consent obtained   - IVF  - Continue antibiotics (zosyn)  - PRN pain and nausea medications  - Daily labs  - Replete electrolytes PRN  - DVT prophylaxis (SCDs and lovenox)  - OOB, ambulate  - Home meds reviewed and reconciled. Continue home albuterol PRN. Restart other home meds as able     Dispo: not yet medically stable for dc

## 2024-12-02 NOTE — PLAN OF CARE
Pre-op assessment completed. Patient verbalized understanding of plan of care. Call bell within reach. Family at the bedside. Belongings left in patient room on [facility].

## 2024-12-03 VITALS
HEART RATE: 89 BPM | SYSTOLIC BLOOD PRESSURE: 98 MMHG | TEMPERATURE: 98 F | WEIGHT: 170 LBS | RESPIRATION RATE: 14 BRPM | HEIGHT: 71 IN | OXYGEN SATURATION: 94 % | DIASTOLIC BLOOD PRESSURE: 55 MMHG | BODY MASS INDEX: 23.8 KG/M2

## 2024-12-03 LAB
ALBUMIN SERPL BCP-MCNC: 3.3 G/DL (ref 3.5–5.2)
ALP SERPL-CCNC: 80 U/L (ref 40–150)
ALT SERPL W/O P-5'-P-CCNC: 395 U/L (ref 10–44)
ANION GAP SERPL CALC-SCNC: 7 MMOL/L (ref 8–16)
AST SERPL-CCNC: 174 U/L (ref 10–40)
BASOPHILS # BLD AUTO: 0.01 K/UL (ref 0–0.2)
BASOPHILS NFR BLD: 0.1 % (ref 0–1.9)
BILIRUB SERPL-MCNC: 1.3 MG/DL (ref 0.1–1)
BUN SERPL-MCNC: 18 MG/DL (ref 6–20)
CALCIUM SERPL-MCNC: 8.4 MG/DL (ref 8.7–10.5)
CHLORIDE SERPL-SCNC: 107 MMOL/L (ref 95–110)
CO2 SERPL-SCNC: 22 MMOL/L (ref 23–29)
CREAT SERPL-MCNC: 1 MG/DL (ref 0.5–1.4)
DIFFERENTIAL METHOD BLD: ABNORMAL
EOSINOPHIL # BLD AUTO: 0 K/UL (ref 0–0.5)
EOSINOPHIL NFR BLD: 0.2 % (ref 0–8)
ERYTHROCYTE [DISTWIDTH] IN BLOOD BY AUTOMATED COUNT: 12.4 % (ref 11.5–14.5)
EST. GFR  (NO RACE VARIABLE): >60 ML/MIN/1.73 M^2
GLUCOSE SERPL-MCNC: 110 MG/DL (ref 70–110)
HCT VFR BLD AUTO: 38.8 % (ref 40–54)
HGB BLD-MCNC: 12.8 G/DL (ref 14–18)
IMM GRANULOCYTES # BLD AUTO: 0.02 K/UL (ref 0–0.04)
IMM GRANULOCYTES NFR BLD AUTO: 0.2 % (ref 0–0.5)
LYMPHOCYTES # BLD AUTO: 1 K/UL (ref 1–4.8)
LYMPHOCYTES NFR BLD: 12.3 % (ref 18–48)
MAGNESIUM SERPL-MCNC: 1.8 MG/DL (ref 1.6–2.6)
MAGNESIUM SERPL-MCNC: 2 MG/DL (ref 1.6–2.6)
MCH RBC QN AUTO: 28.5 PG (ref 27–31)
MCHC RBC AUTO-ENTMCNC: 33 G/DL (ref 32–36)
MCV RBC AUTO: 86 FL (ref 82–98)
MONOCYTES # BLD AUTO: 0.9 K/UL (ref 0.3–1)
MONOCYTES NFR BLD: 10.8 % (ref 4–15)
NEUTROPHILS # BLD AUTO: 6.3 K/UL (ref 1.8–7.7)
NEUTROPHILS NFR BLD: 76.4 % (ref 38–73)
NRBC BLD-RTO: 0 /100 WBC
PHOSPHATE SERPL-MCNC: 3.1 MG/DL (ref 2.7–4.5)
PHOSPHATE SERPL-MCNC: 3.3 MG/DL (ref 2.7–4.5)
PLATELET # BLD AUTO: 225 K/UL (ref 150–450)
PMV BLD AUTO: 9.8 FL (ref 9.2–12.9)
POTASSIUM SERPL-SCNC: 3.8 MMOL/L (ref 3.5–5.1)
PROT SERPL-MCNC: 6.3 G/DL (ref 6–8.4)
RBC # BLD AUTO: 4.49 M/UL (ref 4.6–6.2)
SODIUM SERPL-SCNC: 136 MMOL/L (ref 136–145)
WBC # BLD AUTO: 8.3 K/UL (ref 3.9–12.7)

## 2024-12-03 PROCEDURE — 85025 COMPLETE CBC W/AUTO DIFF WBC: CPT | Performed by: STUDENT IN AN ORGANIZED HEALTH CARE EDUCATION/TRAINING PROGRAM

## 2024-12-03 PROCEDURE — 94799 UNLISTED PULMONARY SVC/PX: CPT

## 2024-12-03 PROCEDURE — 84100 ASSAY OF PHOSPHORUS: CPT | Mod: 91

## 2024-12-03 PROCEDURE — 99900035 HC TECH TIME PER 15 MIN (STAT)

## 2024-12-03 PROCEDURE — 83735 ASSAY OF MAGNESIUM: CPT | Performed by: STUDENT IN AN ORGANIZED HEALTH CARE EDUCATION/TRAINING PROGRAM

## 2024-12-03 PROCEDURE — 94761 N-INVAS EAR/PLS OXIMETRY MLT: CPT

## 2024-12-03 PROCEDURE — 25500020 PHARM REV CODE 255: Performed by: SURGERY

## 2024-12-03 PROCEDURE — 80053 COMPREHEN METABOLIC PANEL: CPT | Performed by: STUDENT IN AN ORGANIZED HEALTH CARE EDUCATION/TRAINING PROGRAM

## 2024-12-03 PROCEDURE — G0378 HOSPITAL OBSERVATION PER HR: HCPCS

## 2024-12-03 PROCEDURE — 83735 ASSAY OF MAGNESIUM: CPT | Mod: 91

## 2024-12-03 PROCEDURE — 84100 ASSAY OF PHOSPHORUS: CPT | Performed by: STUDENT IN AN ORGANIZED HEALTH CARE EDUCATION/TRAINING PROGRAM

## 2024-12-03 PROCEDURE — 25000003 PHARM REV CODE 250: Performed by: STUDENT IN AN ORGANIZED HEALTH CARE EDUCATION/TRAINING PROGRAM

## 2024-12-03 PROCEDURE — A9585 GADOBUTROL INJECTION: HCPCS | Performed by: SURGERY

## 2024-12-03 PROCEDURE — 25000003 PHARM REV CODE 250

## 2024-12-03 PROCEDURE — 36415 COLL VENOUS BLD VENIPUNCTURE: CPT | Performed by: STUDENT IN AN ORGANIZED HEALTH CARE EDUCATION/TRAINING PROGRAM

## 2024-12-03 RX ORDER — GADOBUTROL 604.72 MG/ML
9 INJECTION INTRAVENOUS
Status: COMPLETED | OUTPATIENT
Start: 2024-12-03 | End: 2024-12-03

## 2024-12-03 RX ORDER — METHOCARBAMOL 500 MG/1
500 TABLET, FILM COATED ORAL 4 TIMES DAILY
Qty: 40 TABLET | Refills: 0 | Status: SHIPPED | OUTPATIENT
Start: 2024-12-03 | End: 2024-12-13

## 2024-12-03 RX ORDER — ESCITALOPRAM OXALATE 10 MG/1
10 TABLET ORAL NIGHTLY
Status: DISCONTINUED | OUTPATIENT
Start: 2024-12-03 | End: 2024-12-03 | Stop reason: HOSPADM

## 2024-12-03 RX ORDER — OXYCODONE HYDROCHLORIDE 5 MG/1
5 TABLET ORAL EVERY 4 HOURS PRN
Qty: 15 TABLET | Refills: 0 | Status: SHIPPED | OUTPATIENT
Start: 2024-12-03

## 2024-12-03 RX ORDER — FINASTERIDE 1 MG/1
1 TABLET, FILM COATED ORAL DAILY
Qty: 30 TABLET | Refills: 2 | Status: SHIPPED | OUTPATIENT
Start: 2024-12-03

## 2024-12-03 RX ORDER — ESCITALOPRAM OXALATE 10 MG/1
10 TABLET ORAL NIGHTLY
Qty: 90 TABLET | Refills: 3 | Status: SHIPPED | OUTPATIENT
Start: 2024-12-03

## 2024-12-03 RX ADMIN — ACETAMINOPHEN 1000 MG: 500 TABLET ORAL at 05:12

## 2024-12-03 RX ADMIN — OXYCODONE HYDROCHLORIDE 10 MG: 10 TABLET ORAL at 01:12

## 2024-12-03 RX ADMIN — METHOCARBAMOL 500 MG: 500 TABLET ORAL at 08:12

## 2024-12-03 RX ADMIN — METHOCARBAMOL 500 MG: 500 TABLET ORAL at 02:12

## 2024-12-03 RX ADMIN — OXYCODONE HYDROCHLORIDE 10 MG: 10 TABLET ORAL at 05:12

## 2024-12-03 RX ADMIN — IBUPROFEN 600 MG: 600 TABLET, FILM COATED ORAL at 11:12

## 2024-12-03 RX ADMIN — GADOBUTROL 9 ML: 604.72 INJECTION INTRAVENOUS at 01:12

## 2024-12-03 RX ADMIN — GABAPENTIN 300 MG: 300 CAPSULE ORAL at 08:12

## 2024-12-03 RX ADMIN — IBUPROFEN 600 MG: 600 TABLET, FILM COATED ORAL at 05:12

## 2024-12-03 RX ADMIN — OXYCODONE HYDROCHLORIDE 10 MG: 10 TABLET ORAL at 10:12

## 2024-12-03 RX ADMIN — GABAPENTIN 300 MG: 300 CAPSULE ORAL at 02:12

## 2024-12-03 RX ADMIN — ACETAMINOPHEN 1000 MG: 500 TABLET ORAL at 02:12

## 2024-12-03 NOTE — PROGRESS NOTES
"[facility] Plan of Care Note    Dx:   Cholecystitis [K81.9]  Tachycardia [R00.0]  Right upper quadrant abdominal pain [R10.11]    Shift Events: pain control    Goals of Care: ambulate, advance po intake    Neuro: 4    Vital Signs: /68   Pulse 89   Temp 97.4 °F (36.3 °C) (Oral)   Resp 16   Ht 5' 11" (1.803 m)   Wt 77.1 kg (170 lb)   SpO2 95%   BMI 23.71 kg/m²     Respiratory: ra    Diet: Diet Adult Regular      Is patient tolerating current diet? No, states only able to eat soft foods    GTTS: n/a    Urine Output/Bowel Movement:   I/O this shift:  In: 450 [P.O.:450]  Out: 420 [Urine:420]  Last Bowel Movement: 12/01/24      Drains/Tubes/Tube Feeds (include total output/shift):   I/O this shift:  In: 450 [P.O.:450]  Out: 420 [Urine:420]      Lines: piv      Accuchecks:none    Skin: lap sites x4    Fall Risk Score: 7    Activity level? ind    Any scheduled procedures? no    Any safety concerns? N/a    Other: none   "

## 2024-12-03 NOTE — PLAN OF CARE
12/03/24 1328   Discharge Reassessment   Assessment Type Discharge Planning Reassessment   Did the patient's condition or plan change since previous assessment? No   Discharge Plan discussed with: Patient;Parent(s)   Communicated LUISANA with patient/caregiver Yes   Discharge Plan A Home with family   Discharge Plan B Home   DME Needed Upon Discharge  none   Transition of Care Barriers None   Why the patient remains in the hospital Requires continued medical care   Post-Acute Status   Post-Acute Authorization Other   Other Status No Post-Acute Service Needs   Hospital Resources/Appts/Education Provided Appointments scheduled and added to AVS   Discharge Delays None known at this time

## 2024-12-03 NOTE — PROGRESS NOTES
[facility]  General Surgery  Progress Note    Subjective:     History of Present Illness:  Josef Sage is a 48yoM with a history of hyperlipidemia who presents to the emergency department with complaints of severe epigastric pain.  The patient describes similar episodes but none this severe.  He endorses associated nausea and vomiting for the last 24 hours.  On arrival, the patient is tachycardic and hypertensive.  He continues to endorse nausea and severe epigastric abdominal pain.  Workup demonstrates an elevated leukocytosis and a CT scan that demonstrates a distended gallbladder with significant gallstone burden.  His pain is unrelieved despite multiple IV pain medication doses.  General surgery was consulted for evaluation.    At the time of my exam, the patient is resting in bed.  He continues to endorse abdominal discomfort, although improved with pain medications.  He has never had a history of intra-abdominal surgery.  He takes no blood thinning medications.  I discussed the plan of care with him and he is in agreement.    Post-Op Info:  Procedure(s) (LRB):  CHOLECYSTECTOMY, LAPAROSCOPIC (N/A)   1 Day Post-Op     Interval History: NAEON. Afebrile. HDS. POD1 lap nina. Pain is controlled with current regimen after addition of multimodals last night. Tolerating diet without n/v. No new symptoms or concerns this morning. All questions answered.   AM labs pending    Medications:  Continuous Infusions:  Scheduled Meds:   acetaminophen  1,000 mg Oral Q8H    enoxparin  40 mg Subcutaneous Daily    gabapentin  300 mg Oral TID    ibuprofen  600 mg Oral Q6H    LIDOcaine  2 patch Transdermal Q24H    methocarbamoL  500 mg Oral QID     PRN Meds:  Current Facility-Administered Medications:     albuterol, 1 puff, Inhalation, Q6H PRN    diphenhydrAMINE, 25 mg, Oral, Q6H PRN    ondansetron, 8 mg, Oral, Q8H PRN    oxyCODONE, 5 mg, Oral, Q4H PRN    oxyCODONE, 10 mg, Oral, Q4H PRN    simethicone, 1 tablet, Oral, TID PRN      Review of patient's allergies indicates:   Allergen Reactions    Sulfa (sulfonamide antibiotics) Rash     Objective:     Vital Signs (Most Recent):  Temp: 98.2 °F (36.8 °C) (12/03/24 0403)  Pulse: 65 (12/03/24 0403)  Resp: 16 (12/03/24 0516)  BP: 100/68 (12/03/24 0403)  SpO2: 96 % (12/03/24 0403) Vital Signs (24h Range):  Temp:  [97.4 °F (36.3 °C)-99.2 °F (37.3 °C)] 98.2 °F (36.8 °C)  Pulse:  [] 65  Resp:  [15-20] 16  SpO2:  [94 %-100 %] 96 %  BP: (100-136)/(56-81) 100/68     Weight: 77.1 kg (170 lb)  Body mass index is 23.71 kg/m².    Intake/Output - Last 3 Shifts         12/01 0700  12/02 0659 12/02 0700 12/03 0659 12/03 0700  12/04 0659    P.O. 0 450     I.V. (mL/kg) 463.8 (6)      IV Piggyback 200 792.8     Total Intake(mL/kg) 663.8 (8.6) 1242.8 (16.1)     Urine (mL/kg/hr)  420 (0.2)     Total Output  420     Net +663.8 +822.8            Urine Occurrence 3 x      Stool Occurrence 0 x 0 x     Emesis Occurrence 0 x               Physical Exam  Vitals and nursing note reviewed.   Constitutional:       General: He is not in acute distress.     Appearance: He is not ill-appearing or diaphoretic.      Comments: Room air   HENT:      Head: Normocephalic and atraumatic.      Mouth/Throat:      Mouth: Mucous membranes are moist.      Pharynx: Oropharynx is clear.   Eyes:      Extraocular Movements: Extraocular movements intact.      Conjunctiva/sclera: Conjunctivae normal.   Cardiovascular:      Rate and Rhythm: Normal rate.   Pulmonary:      Effort: Pulmonary effort is normal. No respiratory distress.   Abdominal:      General: There is no distension.      Palpations: Abdomen is soft.      Tenderness: There is no guarding or rebound.      Comments: Incisions are clean, dry, and intact without drainage, erythema, or increased warmth. Appropriately TTP.   Musculoskeletal:         General: No deformity.   Skin:     General: Skin is warm and dry.      Coloration: Skin is not jaundiced.   Neurological:      Mental  Status: He is alert and oriented to person, place, and time.          Significant Labs:  I have reviewed all pertinent lab results within the past 24 hours.    Significant Diagnostics:  I have reviewed all pertinent imaging results/findings within the past 24 hours.  Assessment/Plan:     * Cholelithiasis with acute cholecystitis  Josef Sage is a 48yoM who presents to the emergency department with right upper quadrant abdominal pain. His work-up is consistent with cholecystitis, now with hyperbilirubinemia, no ductal dilation on US 12/1. S/p lap cholecystectomy 12/2    - Regular diet  - Multimodal pain regimen (scheduled tylenol, gabapentin, robaxin, ibuprofen, lidocaine patch)  - PRN pain and nausea medications  - Daily labs  - Replete electrolytes PRN  - DVT prophylaxis (SCDs and lovenox)  - OOB, ambulate  - IS  - Home meds reviewed and reconciled. Continue home albuterol PRN, lexapro restarted this AM. Restart other home meds as able     Dispo: home later today pending AM labs      Hypokalemia  - Replete PRN  - Continue to monitor with daily CMP    Case discussed with Dr. James.      Casey Elder PAYovaniC  General Surgery  [facility]

## 2024-12-03 NOTE — PLAN OF CARE
[facility]  Discharge Final Note    Primary Care Provider: Ambreen Prather MD    Expected Discharge Date: 12/3/2024    Final Discharge Note (most recent)       Final Note - 12/03/24 1612          Final Note    Assessment Type Final Discharge Note     Anticipated Discharge Disposition Home or Self Care     Hospital Resources/Appts/Education Provided Appointments scheduled and added to AVS        Post-Acute Status    Post-Acute Authorization Other     Other Status No Post-Acute Service Needs     Discharge Delays None known at this time                     Important Message from Medicare             Contact Info       Sahil James MD   Specialty: General Surgery    [address]  [facility] [address]   Phone: [phone]       Next Steps: Follow up in 2 week(s)

## 2024-12-03 NOTE — DISCHARGE INSTRUCTIONS
Postoperative General Instructions    What to Expect:  It is normal to experience pain and swelling at the surgical site.  The pain usually decreases significantly after the first week but may last for many weeks.  Each day, the pain should be similar or better to the previous day. If it worsens, call the doctor's office.     Activity:  You should walk beginning on the day of surgery and increase activity slowly over the next two to four weeks.  Do not drive while taking prescription pain medication.  You may return to work when you feel ready.  Do not lift anything heavier than 10 lbs for 6 weeks     Wound Care:  You may shower. Let soap and water run over the incisions and pat dry. Do not submerge in a bathtub or pool.  If you have an open wound, you should change the dressing twice daily with moist gauze.     Diet:  You may resume your normal diet postoperatively.  Take a stool softener or laxative to avoid constipation.     Medication:  Pain Control  Take acetaminophen (Tylenol) 650 mg 4 times daily as needed for pain.  Take ibuprofen 600 mg 3 times daily as needed for pain. Stop taking this medication if it causes an upset stomach.  Take the prescribed oxycodone as needed if you have pain that is not controlled by acetaminophen and ibuprofen.  Bowel Regularity  Take an over-the-counter stool softener/laxative (Colace, Miralax, or Milk of Magnesia) to avoid constipation.  Previous Home Medication  Restart your previous home medication unless otherwise instructed.    Call Your Doctor's Office If You Experience:  Fevers greater than 101.3°F.  Vomiting.  Spreading redness or drainage from the incisions.  Opening of the incisions.  Worsening pain not controlled by medication.  Chest pain or shortness of breath.    Follow-Up:  Follow-up with your surgeon as scheduled in two weeks.  If no follow-up appointment has been scheduled, call to schedule an appointment within 1-2 weeks of discharge.     Contact  Information:  During normal business hours call the clinic with any questions or concerns. On weekends and evenings call [phone] to have the operators page the surgery resident on call after hours with questions or concerns.

## 2024-12-03 NOTE — ASSESSMENT & PLAN NOTE
Josef Sage is a 48yoM who presents to the emergency department with right upper quadrant abdominal pain. His work-up is consistent with cholecystitis, now with hyperbilirubinemia, no ductal dilation on US 12/1. S/p lap cholecystectomy 12/2    - Regular diet  - Multimodal pain regimen (scheduled tylenol, gabapentin, robaxin, ibuprofen, lidocaine patch)  - PRN pain and nausea medications  - Daily labs  - Replete electrolytes PRN  - DVT prophylaxis (SCDs and lovenox)  - OOB, ambulate  - IS  - Home meds reviewed and reconciled. Continue home albuterol PRN. Restart other home meds as able     Dispo: home later today pending AM labs

## 2024-12-03 NOTE — SUBJECTIVE & OBJECTIVE
Interval History: NAEON. Afebrile. HDS. POD1 lap nina. Pain is controlled with current regimen after addition of multimodals last night. Tolerating diet without n/v. No new symptoms or concerns this morning. All questions answered.   AM labs pending    Medications:  Continuous Infusions:  Scheduled Meds:   acetaminophen  1,000 mg Oral Q8H    enoxparin  40 mg Subcutaneous Daily    gabapentin  300 mg Oral TID    ibuprofen  600 mg Oral Q6H    LIDOcaine  2 patch Transdermal Q24H    methocarbamoL  500 mg Oral QID     PRN Meds:  Current Facility-Administered Medications:     albuterol, 1 puff, Inhalation, Q6H PRN    diphenhydrAMINE, 25 mg, Oral, Q6H PRN    ondansetron, 8 mg, Oral, Q8H PRN    oxyCODONE, 5 mg, Oral, Q4H PRN    oxyCODONE, 10 mg, Oral, Q4H PRN    simethicone, 1 tablet, Oral, TID PRN     Review of patient's allergies indicates:   Allergen Reactions    Sulfa (sulfonamide antibiotics) Rash     Objective:     Vital Signs (Most Recent):  Temp: 98.2 °F (36.8 °C) (12/03/24 0403)  Pulse: 65 (12/03/24 0403)  Resp: 16 (12/03/24 0516)  BP: 100/68 (12/03/24 0403)  SpO2: 96 % (12/03/24 0403) Vital Signs (24h Range):  Temp:  [97.4 °F (36.3 °C)-99.2 °F (37.3 °C)] 98.2 °F (36.8 °C)  Pulse:  [] 65  Resp:  [15-20] 16  SpO2:  [94 %-100 %] 96 %  BP: (100-136)/(56-81) 100/68     Weight: 77.1 kg (170 lb)  Body mass index is 23.71 kg/m².    Intake/Output - Last 3 Shifts         12/01 0700  12/02 0659 12/02 0700 12/03 0659 12/03 0700 12/04 0659    P.O. 0 450     I.V. (mL/kg) 463.8 (6)      IV Piggyback 200 792.8     Total Intake(mL/kg) 663.8 (8.6) 1242.8 (16.1)     Urine (mL/kg/hr)  420 (0.2)     Total Output  420     Net +663.8 +822.8            Urine Occurrence 3 x      Stool Occurrence 0 x 0 x     Emesis Occurrence 0 x               Physical Exam  Vitals and nursing note reviewed.   Constitutional:       General: He is not in acute distress.     Appearance: He is not ill-appearing or diaphoretic.      Comments: Room air    HENT:      Head: Normocephalic and atraumatic.      Mouth/Throat:      Mouth: Mucous membranes are moist.      Pharynx: Oropharynx is clear.   Eyes:      Extraocular Movements: Extraocular movements intact.      Conjunctiva/sclera: Conjunctivae normal.   Cardiovascular:      Rate and Rhythm: Normal rate.   Pulmonary:      Effort: Pulmonary effort is normal. No respiratory distress.   Abdominal:      General: There is no distension.      Palpations: Abdomen is soft.      Tenderness: There is no guarding or rebound.      Comments: Incisions are clean, dry, and intact without drainage, erythema, or increased warmth. Appropriately TTP.   Musculoskeletal:         General: No deformity.   Skin:     General: Skin is warm and dry.      Coloration: Skin is not jaundiced.   Neurological:      Mental Status: He is alert and oriented to person, place, and time.          Significant Labs:  I have reviewed all pertinent lab results within the past 24 hours.    Significant Diagnostics:  I have reviewed all pertinent imaging results/findings within the past 24 hours.

## 2024-12-03 NOTE — PLAN OF CARE
12/03/24 1327   Post-Acute Status   Post-Acute Authorization Other   Other Status No Post-Acute Service Needs   Hospital Resources/Appts/Education Provided Appointments scheduled and added to AVS   Discharge Delays None known at this time   Discharge Plan   Discharge Plan A Home with family   Discharge Plan B Home

## 2024-12-03 NOTE — PLAN OF CARE
Problem: Adult Inpatient Plan of Care  Goal: Plan of Care Review  Outcome: Met     Problem: Adult Inpatient Plan of Care  Goal: Optimal Comfort and Wellbeing  Outcome: Met     Problem: Adult Inpatient Plan of Care  Goal: Readiness for Transition of Care  Outcome: Met

## 2024-12-04 LAB
FINAL PATHOLOGIC DIAGNOSIS: NORMAL
GROSS: NORMAL
Lab: NORMAL

## 2024-12-04 NOTE — DISCHARGE SUMMARY
[facility]  DISCHARGE SUMMARY  General Surgery      Admit Date:  12/1/2024    Discharge Date and Time:  12/4/2024  12:00 PM    Attending Physician:  No att. providers found     Discharge Provider:  Eb Doty MD     Reason for Admission:  Cholelithiasis with acute cholecystitis     Procedures Performed:  Procedure(s) (LRB):  CHOLECYSTECTOMY, LAPAROSCOPIC (N/A)    HPI:   Josef Sage is a 48yoM with a history of hyperlipidemia who presents to the emergency department with complaints of severe epigastric pain.  The patient describes similar episodes but none this severe.  He endorses associated nausea and vomiting for the last 24 hours.  On arrival, the patient is tachycardic and hypertensive.  He continues to endorse nausea and severe epigastric abdominal pain.  Workup demonstrates an elevated leukocytosis and a CT scan that demonstrates a distended gallbladder with significant gallstone burden.  His pain is unrelieved despite multiple IV pain medication doses.  General surgery was consulted for evaluation.     At the time of my exam, the patient is resting in bed.  He continues to endorse abdominal discomfort, although improved with pain medications.  He has never had a history of intra-abdominal surgery.  He takes no blood thinning medications.  I discussed the plan of care with him and he is in agreement.    Hospital Course:  Following a complete preoperative discussion of the risks and benefits of surgery with signed informed consent, the patient was taken to the operating room on 12/2/2024 and underwent the above stated procedures.  The patient tolerated surgery well and there were no complications.  Please see the operative report for full intraoperative findings and details.  Postoperatively, the patient did well and was transferred from the PACU to the floor in stable condition where they had a stable and uncomplicated hospital course. POD 1 his bilirubin remained the same as prior to surgery. Given  "concern for choledocholithiasis, an MRCP was obtained which was normal. Diet was advanced as tolerated and the patient's pain was controlled on oral pain medications without problem.  Currently, the patient is doing well at 2 Days Post-Op and is stable and appropriate for discharge home at this time.      Consults:  None.    Significant Diagnostic Studies:   Recent Labs   Lab 12/02/24  0431 12/03/24  0538   WBC 10.19 8.30   HGB 14.9 12.8*   HCT 43.1 38.8*    225     Recent Labs   Lab 12/02/24  0431 12/03/24  0538 12/03/24  0555    136  --    K 3.3* 3.8  --     107  --    CO2 23 22*  --    BUN 15 18  --    CREATININE 1.0 1.0  --     110  --    CALCIUM 8.7 8.4*  --    MG  --  2.0 1.8   PHOS  --  3.3 3.1   * 174*  --    * 395*  --    ALKPHOS 69 80  --    BILITOT 1.3* 1.3*  --    PROT 6.8 6.3  --    ALBUMIN 3.8 3.3*  --    No results for input(s): "INR", "PTT", "LABHEPA", "LACTATE", "TROPONINI", "CPK", "CPKMB", "MB", "BNP" in the last 72 hours.No results for input(s): "PH", "PCO2", "PO2", "HCO3" in the last 72 hours.      Final Diagnoses:   Principal Problem:  Cholelithiasis with acute cholecystitis   Secondary Diagnoses:    Active Hospital Problems    Diagnosis  POA    *Cholelithiasis with acute cholecystitis [K80.00]  Yes    Hyperbilirubinemia [E80.6]  No    Hypokalemia [E87.6]  Yes      Resolved Hospital Problems   No resolved problems to display.       Discharged Condition:  Good    Disposition:  Home or Self Care    Follow Up/Patient Instructions:   Follow up in two weeks  Medications:  Reconciled Home Medications:    Discharge Medication List as of 12/3/2024  3:26 PM        START taking these medications    Details   methocarbamoL (ROBAXIN) 500 MG Tab Take 1 tablet (500 mg total) by mouth 4 (four) times daily. for 10 days, Starting Tue 12/3/2024, Until Fri 12/13/2024, Normal      oxyCODONE (ROXICODONE) 5 MG immediate release tablet Take 1 tablet (5 mg total) by mouth every 4 " (four) hours as needed for Pain., Starting Tue 12/3/2024, Normal           CONTINUE these medications which have NOT CHANGED    Details   albuterol (PROVENTIL/VENTOLIN HFA) 90 mcg/actuation inhaler Inhale 1-2 puffs into the lungs every 6 (six) hours as needed for Wheezing., Starting Wed 3/22/2023, Until Thu 3/21/2024 at 2359, Normal      fluticasone propionate (FLONASE) 50 mcg/actuation nasal spray 2 sprays (100 mcg total) by Each Nostril route 2 (two) times daily., Starting Wed 3/29/2023, Normal      multivitamin capsule Take 1 capsule by mouth once daily., Historical Med      promethazine (PHENERGAN) 25 MG suppository Place 1 suppository (25 mg total) rectally every 6 (six) hours as needed for Nausea., Starting Thu 12/7/2023, Print      EScitalopram oxalate (LEXAPRO) 10 MG tablet Take 1 tablet (10 mg total) by mouth every evening., Starting Mon 11/13/2023, Normal      PROPECIA 1 mg tablet Take 1 tablet (1 mg total) by mouth once daily., Starting Tue 6/28/2022, Normal           STOP taking these medications       rosuvastatin (CRESTOR) 20 MG tablet Comments:   Reason for Stopping:             Discharge Procedure Orders   Lifting restrictions   Order Comments: Do not lift anything >10 lbs (about a gallon of milk) for 6 weeks     No driving until:   Order Comments: No longer taking narcotic pain medication and can turn around safely without pain.     Notify your health care provider if you experience any of the following:  temperature >100.4     Notify your health care provider if you experience any of the following:  persistent nausea and vomiting or diarrhea     Notify your health care provider if you experience any of the following:  severe uncontrolled pain     Notify your health care provider if you experience any of the following:  redness, tenderness, or signs of infection (pain, swelling, redness, odor or green/yellow discharge around incision site)     Notify your health care provider if you experience any  of the following:  difficulty breathing or increased cough     Notify your health care provider if you experience any of the following:  severe persistent headache     Notify your health care provider if you experience any of the following:  worsening rash     Notify your health care provider if you experience any of the following:  persistent dizziness, light-headedness, or visual disturbances     Notify your health care provider if you experience any of the following:  increased confusion or weakness     No dressing needed   Order Comments: Okay to shower the day after surgery. Please do not submerge wounds underwater (no bath, no pool, no lake, etc.) for at least 2 weeks.     Activity as tolerated      Follow-up Information       Sahil James MD Follow up in 2 week(s).    Specialty: General Surgery  Contact information:  [facility] [id number] [phone]                             Eb Doty MD

## 2024-12-05 ENCOUNTER — PATIENT OUTREACH (OUTPATIENT)
Dept: ADMINISTRATIVE | Facility: CLINIC | Age: 49
End: 2024-12-05
Payer: COMMERCIAL

## 2024-12-05 NOTE — PROGRESS NOTES
C3 nurse spoke with Josef Sage (mother, Sagar) for a TCC post hospital discharge follow up call. Nurse offered to scheduled TCC hospital follow-up appointment. The patient's mother declined a PCP \[facility]\"" appointment at this time.

## 2024-12-05 NOTE — OP NOTE
DATE OF PROCEDURE: 12/02/2024.     PREOPERATIVE DIAGNOSIS: Acute cholecystitis.     POSTOPERATIVE DIAGNOSIS: Acute cholecystitis.     PROCEDURE PERFORMED: Laparoscopic cholecystectomy.     ATTENDING SURGEON: Sahil James MD.     RESIDENT: Swapna Matias MD (RES).    ANESTHESIA: General endotracheal.     INDICATIONS: Josef Sage is a 49 y.o.male who presented to the ER with right upper quadrant abdominal pain. The history and exam were consistent with acute cholecystitis, which was confirmed by laboratory studies and ultrasound. We recommended laparoscopic cholecystectomy and the patient agreed to proceed. The patient signed informed consent and expressed understanding of the risks and benefits of surgery.     OPERATIVE PROCEDURE: The patient was taken to the operating room and placed supine. After induction of general endotracheal tube anesthesia, the abdomen was prepped and draped in the standard fashion. Timeout was performed. A  infraumbilical skin incision was made. Subcutaneous tissue was bluntly dissected. The umbilical stalk was grasped with penetrating towel clip and elevated and the fascia was sharply incised. The abdomen was bluntly entered under direct vision. A Vern trocar was placed and the abdomen was insufflated with carbon dioxide to a pressure of 15 mmHg. A 10-mm laparoscope was placed and the abdomen was examined. There was no evidence of injury related to entry. Three 5-mm trocars were placed under direct vision through separate stab incisions, one subxiphoid and two in the right upper quadrant. We directed our attention to the right upper quadrant. The gallbladder was identified and noted to have significant inflammatory change. The fundus was grasped and retracted cranially and the infundibulum was grasped and retracted laterally. We bluntly dissected the peritoneal reflection off the infundibulum and neck of the gallbladder. With careful blunt dissection in this area, we were able to  identify the cystic duct. Further careful dissection identified the cystic artery and we did obtain a critical view of safety. Both duct and artery were triply clipped and divided. The gallbladder was dissected off the gallbladder fossa using Bovie electrocautery from infundibulum to fundus until free. It was placed into an EndoCatch bag and removed from the umbilical port site with no difficulty. The gallbladder fossa was examined and no bleeding or bile leak were noted. The clips on the cystic duct and artery were intact. The right upper quadrant was irrigated with saline briefly until the returning effluent was clear. All ports were removed under direct vision and no bleeding was noted. The insufflation was evacuated. The umbilical fascia was closed with 0 Vicryl suture. Local anesthetic was applied to each incision were closed with subcuticular 4-0 Monocryl. Dermabond was applied. The patient was extubated and transferred to the Recovery Room in good condition. All needle and sponge counts were correct at the conclusion of the case. I was present for the procedure in its entirety.    ESTIMATED BLOOD LOSS: 15 mL.     FINDINGS: Critical view obtained prior to clip placement.      SPECIMEN: Gallbladder.

## 2024-12-09 ENCOUNTER — HOSPITAL ENCOUNTER (EMERGENCY)
Facility: HOSPITAL | Age: 49
Discharge: HOME | End: 2024-12-10
Attending: EMERGENCY MEDICINE
Payer: COMMERCIAL

## 2024-12-09 ENCOUNTER — NURSE TRIAGE (OUTPATIENT)
Dept: ADMINISTRATIVE | Facility: CLINIC | Age: 49
End: 2024-12-09
Payer: COMMERCIAL

## 2024-12-09 DIAGNOSIS — T81.31XA POSTOPERATIVE WOUND BREAKDOWN, INITIAL ENCOUNTER: Primary | ICD-10-CM

## 2024-12-09 DIAGNOSIS — R52 PAIN: ICD-10-CM

## 2024-12-09 PROCEDURE — 85025 COMPLETE CBC W/AUTO DIFF WBC: CPT

## 2024-12-09 PROCEDURE — 93010 ELECTROCARDIOGRAM REPORT: CPT | Mod: ,,, | Performed by: INTERNAL MEDICINE

## 2024-12-09 PROCEDURE — 83690 ASSAY OF LIPASE: CPT

## 2024-12-09 PROCEDURE — 93005 ELECTROCARDIOGRAM TRACING: CPT

## 2024-12-09 PROCEDURE — 99284 EMERGENCY DEPT VISIT MOD MDM: CPT | Mod: 25

## 2024-12-09 PROCEDURE — 86901 BLOOD TYPING SEROLOGIC RH(D): CPT

## 2024-12-09 PROCEDURE — 80053 COMPREHEN METABOLIC PANEL: CPT

## 2024-12-09 NOTE — Clinical Note
"Josef "Joseflucio Sage was seen and treated in our emergency department on 12/9/2024.  He may return to work on 12/13/2024.       If you have any questions or concerns, please don't hesitate to call.      Mike Franco MD"

## 2024-12-10 ENCOUNTER — TELEPHONE (OUTPATIENT)
Dept: SURGERY | Facility: CLINIC | Age: 49
End: 2024-12-10
Payer: COMMERCIAL

## 2024-12-10 ENCOUNTER — TELEPHONE (OUTPATIENT)
Dept: PRIMARY CARE CLINIC | Facility: CLINIC | Age: 49
End: 2024-12-10
Payer: COMMERCIAL

## 2024-12-10 VITALS
DIASTOLIC BLOOD PRESSURE: 94 MMHG | RESPIRATION RATE: 19 BRPM | WEIGHT: 170 LBS | OXYGEN SATURATION: 97 % | TEMPERATURE: 98 F | BODY MASS INDEX: 23.8 KG/M2 | SYSTOLIC BLOOD PRESSURE: 150 MMHG | HEIGHT: 71 IN | HEART RATE: 82 BPM

## 2024-12-10 LAB
ABO + RH BLD: NORMAL
ALBUMIN SERPL BCP-MCNC: 3.6 G/DL (ref 3.5–5.2)
ALP SERPL-CCNC: 102 U/L (ref 40–150)
ALT SERPL W/O P-5'-P-CCNC: 76 U/L (ref 10–44)
ANION GAP SERPL CALC-SCNC: 10 MMOL/L (ref 8–16)
AST SERPL-CCNC: 25 U/L (ref 10–40)
BASOPHILS # BLD AUTO: 0.08 K/UL (ref 0–0.2)
BASOPHILS NFR BLD: 0.9 % (ref 0–1.9)
BILIRUB SERPL-MCNC: 0.4 MG/DL (ref 0.1–1)
BLD GP AB SCN CELLS X3 SERPL QL: NORMAL
BUN SERPL-MCNC: 11 MG/DL (ref 6–20)
CALCIUM SERPL-MCNC: 9.2 MG/DL (ref 8.7–10.5)
CHLORIDE SERPL-SCNC: 107 MMOL/L (ref 95–110)
CO2 SERPL-SCNC: 22 MMOL/L (ref 23–29)
CREAT SERPL-MCNC: 0.8 MG/DL (ref 0.5–1.4)
DIFFERENTIAL METHOD BLD: ABNORMAL
EOSINOPHIL # BLD AUTO: 0.3 K/UL (ref 0–0.5)
EOSINOPHIL NFR BLD: 3.2 % (ref 0–8)
ERYTHROCYTE [DISTWIDTH] IN BLOOD BY AUTOMATED COUNT: 12.5 % (ref 11.5–14.5)
EST. GFR  (NO RACE VARIABLE): >60 ML/MIN/1.73 M^2
GLUCOSE SERPL-MCNC: 105 MG/DL (ref 70–110)
HCT VFR BLD AUTO: 37.2 % (ref 40–54)
HGB BLD-MCNC: 12.8 G/DL (ref 14–18)
IMM GRANULOCYTES # BLD AUTO: 0.12 K/UL (ref 0–0.04)
IMM GRANULOCYTES NFR BLD AUTO: 1.3 % (ref 0–0.5)
LIPASE SERPL-CCNC: 72 U/L (ref 4–60)
LYMPHOCYTES # BLD AUTO: 1.7 K/UL (ref 1–4.8)
LYMPHOCYTES NFR BLD: 18.4 % (ref 18–48)
MCH RBC QN AUTO: 29.5 PG (ref 27–31)
MCHC RBC AUTO-ENTMCNC: 34.4 G/DL (ref 32–36)
MCV RBC AUTO: 86 FL (ref 82–98)
MONOCYTES # BLD AUTO: 0.7 K/UL (ref 0.3–1)
MONOCYTES NFR BLD: 7.1 % (ref 4–15)
NEUTROPHILS # BLD AUTO: 6.5 K/UL (ref 1.8–7.7)
NEUTROPHILS NFR BLD: 69.1 % (ref 38–73)
NRBC BLD-RTO: 0 /100 WBC
OHS QRS DURATION: 78 MS
OHS QTC CALCULATION: 406 MS
PLATELET # BLD AUTO: 356 K/UL (ref 150–450)
PMV BLD AUTO: 9.5 FL (ref 9.2–12.9)
POTASSIUM SERPL-SCNC: 4 MMOL/L (ref 3.5–5.1)
PROT SERPL-MCNC: 7.7 G/DL (ref 6–8.4)
RBC # BLD AUTO: 4.34 M/UL (ref 4.6–6.2)
SODIUM SERPL-SCNC: 139 MMOL/L (ref 136–145)
SPECIMEN OUTDATE: NORMAL
WBC # BLD AUTO: 9.34 K/UL (ref 3.9–12.7)

## 2024-12-10 NOTE — CONSULTS
[facility] - Emergency Dept  General Surgery  Consult Note    Patient Name: Josef Sage  MRN: [id number]  Code Status: Prior  Admission Date: 12/9/2024  Hospital Length of Stay: 0 days  Attending Physician: Elma Do MD  Primary Care Provider: Ambreen Prather MD    Patient information was obtained from patient, past medical records, and ER records.     Inpatient consult to General Surgery  Consult performed by: Juma Hdz MD  Consult ordered by: Mike Franco MD        Subjective:     Principal Problem: Subcutaneous hematoma    History of Present Illness: 48 yo M s/p lap nina with our team on 12/2 presenting from home after his umbilical incision drained ~10 cc of blood. He has otherwise been doing OK. There was some pain associated with the pressure with has now improved. He denies systemic signs of infection.    No current facility-administered medications on file prior to encounter.     Current Outpatient Medications on File Prior to Encounter   Medication Sig    albuterol (PROVENTIL/VENTOLIN HFA) 90 mcg/actuation inhaler Inhale 1-2 puffs into the lungs every 6 (six) hours as needed for Wheezing.    EScitalopram oxalate (LEXAPRO) 10 MG tablet Take 1 tablet (10 mg total) by mouth every evening.    fluticasone propionate (FLONASE) 50 mcg/actuation nasal spray 2 sprays (100 mcg total) by Each Nostril route 2 (two) times daily.    methocarbamoL (ROBAXIN) 500 MG Tab Take 1 tablet (500 mg total) by mouth 4 (four) times daily. for 10 days    multivitamin capsule Take 1 capsule by mouth once daily. (Patient not taking: Reported on 12/5/2024)    oxyCODONE (ROXICODONE) 5 MG immediate release tablet Take 1 tablet (5 mg total) by mouth every 4 (four) hours as needed for Pain.    promethazine (PHENERGAN) 25 MG suppository Place 1 suppository (25 mg total) rectally every 6 (six) hours as needed for Nausea.    finasteride (PROPECIA) 1 mg tablet Take 1 tablet (1 mg total) by mouth once daily.       Review of  patient's allergies indicates:   Allergen Reactions    Sulfa (sulfonamide antibiotics) Rash       Past Medical History:   Diagnosis Date    Anxiety     Asthma     Bronchitis     Cervical spondylosis with radiculopathy 05/10/2023    Mixed hyperlipidemia 09/17/2019    Recurrent upper respiratory infection (URI)     Wheezes      Past Surgical History:   Procedure Laterality Date    ANTERIOR CERVICAL DISCECTOMY W/ FUSION N/A 05/10/2023    Procedure: C5-6 ACDF;  Surgeon: Ivan Dalton MD;  Location: [facility] OR [address] [place] [address];  Service: Neurosurgery;  Laterality: N/A;  C5-6 ACDF  anesthesia: general  nerve mon: EMG,SEP, MEP  radiology; C-ARM  bed: regular slider  headrest: caspar, GW tongs/hanging weights/ surgical pillow  misc; interbody paddle distractor, nerve root retractor ([employer])    LAPAROSCOPIC CHOLECYSTECTOMY N/A 12/2/2024    Procedure: CHOLECYSTECTOMY, LAPAROSCOPIC;  Surgeon: Sahil James MD;  Location: [facility] OR [address] [place] [address];  Service: General;  Laterality: N/A;  with intraoperative cholangiogram    MOUTH SURGERY      wisdom teeth    None       Family History       Problem Relation (Age of Onset)    Cancer Maternal Cousin    Eczema Sister    Hypertension Mother, Father          Tobacco Use    Smoking status: Never    Smokeless tobacco: Never   Substance and Sexual Activity    Alcohol use: Not Currently     Alcohol/week: 1.0 standard drink of alcohol     Types: 1 Glasses of wine per week    Drug use: No     Comment: CBD gummies    Sexual activity: Yes     Partners: Female     Comment: Not      Review of Systems   Constitutional:  Negative for appetite change and unexpected weight change.   HENT:  Negative for sore throat and trouble swallowing.    Respiratory:  Negative for chest tightness and shortness of breath.    Cardiovascular:  Negative for chest pain and leg swelling.   Gastrointestinal:  Negative for abdominal distention, abdominal pain and blood in stool.   Skin:  Positive for wound. Negative for pallor.    All other systems reviewed and are negative.    Objective:     Vital Signs (Most Recent):  Temp: 98.3 °F (36.8 °C) (12/09/24 2227)  Pulse: 87 (12/09/24 2350)  Resp: 18 (12/09/24 2350)  BP: (!) 147/111 (12/09/24 2227)  SpO2: 97 % (12/09/24 2350) Vital Signs (24h Range):  Temp:  [98.3 °F (36.8 °C)] 98.3 °F (36.8 °C)  Pulse:  [87-97] 87  Resp:  [16-18] 18  SpO2:  [97 %] 97 %  BP: (147)/(111) 147/111     Weight: 77.1 kg (169 lb 15.6 oz)  Body mass index is 23.71 kg/m².     Physical Exam  Vitals reviewed.   Constitutional:       Appearance: Normal appearance.   HENT:      Head: Normocephalic.   Eyes:      Extraocular Movements: Extraocular movements intact.      Conjunctiva/sclera: Conjunctivae normal.   Cardiovascular:      Rate and Rhythm: Normal rate and regular rhythm.   Pulmonary:      Effort: Pulmonary effort is normal. No respiratory distress.   Abdominal:      Palpations: Abdomen is soft.      Tenderness: There is no abdominal tenderness.      Comments: Umbilical incision with mild ecchymosis and a small underlying hematoma. The incision here has slight opening with minimal old blood able to be expressed. The remainder of the incisions look OK. Appropriately tender.   Musculoskeletal:         General: No signs of injury.      Cervical back: Normal range of motion and neck supple.      Right lower leg: No edema.      Left lower leg: No edema.   Skin:     General: Skin is warm and dry.   Neurological:      General: No focal deficit present.      Mental Status: He is alert and oriented to person, place, and time.            Labs are pending    Significant Diagnostics:  NA    Assessment/Plan:     Cholelithiasis with acute cholecystitis  48 yo M s/p yu wood with our team on 12/2 presenting after a likely small, subcutaneous hematoma at his umbilicus decompressed through the incision. He is otherwise doing well.    OK for discharge from the ED pending his labs  OK to maintain follow-up as scheduled. Dry gauze to  incision as needed      VTE Risk Mitigation (From admission, onward)      None            Thank you for your consult. I will follow-up with patient. Please contact us if you have any additional questions.    Juma Hdz MD  General Surgery  [facility] - Emergency Dept

## 2024-12-10 NOTE — SUBJECTIVE & OBJECTIVE
No current facility-administered medications on file prior to encounter.     Current Outpatient Medications on File Prior to Encounter   Medication Sig    albuterol (PROVENTIL/VENTOLIN HFA) 90 mcg/actuation inhaler Inhale 1-2 puffs into the lungs every 6 (six) hours as needed for Wheezing.    EScitalopram oxalate (LEXAPRO) 10 MG tablet Take 1 tablet (10 mg total) by mouth every evening.    fluticasone propionate (FLONASE) 50 mcg/actuation nasal spray 2 sprays (100 mcg total) by Each Nostril route 2 (two) times daily.    methocarbamoL (ROBAXIN) 500 MG Tab Take 1 tablet (500 mg total) by mouth 4 (four) times daily. for 10 days    multivitamin capsule Take 1 capsule by mouth once daily. (Patient not taking: Reported on 12/5/2024)    oxyCODONE (ROXICODONE) 5 MG immediate release tablet Take 1 tablet (5 mg total) by mouth every 4 (four) hours as needed for Pain.    promethazine (PHENERGAN) 25 MG suppository Place 1 suppository (25 mg total) rectally every 6 (six) hours as needed for Nausea.    finasteride (PROPECIA) 1 mg tablet Take 1 tablet (1 mg total) by mouth once daily.       Review of patient's allergies indicates:   Allergen Reactions    Sulfa (sulfonamide antibiotics) Rash       Past Medical History:   Diagnosis Date    Anxiety     Asthma     Bronchitis     Cervical spondylosis with radiculopathy 05/10/2023    Mixed hyperlipidemia 09/17/2019    Recurrent upper respiratory infection (URI)     Wheezes      Past Surgical History:   Procedure Laterality Date    ANTERIOR CERVICAL DISCECTOMY W/ FUSION N/A 05/10/2023    Procedure: C5-6 ACDF;  Surgeon: Ivan Dalton MD;  Location: [facility] OR [address] [place] [address];  Service: Neurosurgery;  Laterality: N/A;  C5-6 ACDF  anesthesia: general  nerve mon: EMG,SEP, MEP  radiology; C-ARM  bed: regular slider  headrest: MINDI eddy/hanging weights/ surgical pillow  misc; interbody paddle distractor, nerve root retractor (depuy)    LAPAROSCOPIC CHOLECYSTECTOMY N/A 12/2/2024    Procedure:  CHOLECYSTECTOMY, LAPAROSCOPIC;  Surgeon: Sahil James MD;  Location: [facility] OR [address] [place] [address];  Service: General;  Laterality: N/A;  with intraoperative cholangiogram    MOUTH SURGERY      wisdom teeth    None       Family History       Problem Relation (Age of Onset)    Cancer Maternal Cousin    Eczema Sister    Hypertension Mother, Father          Tobacco Use    Smoking status: Never    Smokeless tobacco: Never   Substance and Sexual Activity    Alcohol use: Not Currently     Alcohol/week: 1.0 standard drink of alcohol     Types: 1 Glasses of wine per week    Drug use: No     Comment: CBD gummies    Sexual activity: Yes     Partners: Female     Comment: Not      Review of Systems   Constitutional:  Negative for appetite change and unexpected weight change.   HENT:  Negative for sore throat and trouble swallowing.    Respiratory:  Negative for chest tightness and shortness of breath.    Cardiovascular:  Negative for chest pain and leg swelling.   Gastrointestinal:  Negative for abdominal distention, abdominal pain and blood in stool.   Skin:  Positive for wound. Negative for pallor.   All other systems reviewed and are negative.    Objective:     Vital Signs (Most Recent):  Temp: 98.3 °F (36.8 °C) (12/09/24 2227)  Pulse: 87 (12/09/24 2350)  Resp: 18 (12/09/24 2350)  BP: (!) 147/111 (12/09/24 2227)  SpO2: 97 % (12/09/24 2350) Vital Signs (24h Range):  Temp:  [98.3 °F (36.8 °C)] 98.3 °F (36.8 °C)  Pulse:  [87-97] 87  Resp:  [16-18] 18  SpO2:  [97 %] 97 %  BP: (147)/(111) 147/111     Weight: 77.1 kg (169 lb 15.6 oz)  Body mass index is 23.71 kg/m².     Physical Exam  Vitals reviewed.   Constitutional:       Appearance: Normal appearance.   HENT:      Head: Normocephalic.   Eyes:      Extraocular Movements: Extraocular movements intact.      Conjunctiva/sclera: Conjunctivae normal.   Cardiovascular:      Rate and Rhythm: Normal rate and regular rhythm.   Pulmonary:      Effort: Pulmonary effort is normal. No  respiratory distress.   Abdominal:      Palpations: Abdomen is soft.      Tenderness: There is no abdominal tenderness.      Comments: Umbilical incision with mild ecchymosis and a small underlying hematoma. The incision here has slight opening with minimal old blood able to be expressed. The remainder of the incisions look OK. Appropriately tender.   Musculoskeletal:         General: No signs of injury.      Cervical back: Normal range of motion and neck supple.      Right lower leg: No edema.      Left lower leg: No edema.   Skin:     General: Skin is warm and dry.   Neurological:      General: No focal deficit present.      Mental Status: He is alert and oriented to person, place, and time.            Labs are pending    Significant Diagnostics:  NA

## 2024-12-10 NOTE — HPI
48 yo M s/p yu wood with our team on 12/2 presenting from home after his umbilical incision drained ~10 cc of blood. He has otherwise been doing OK. There was some pain associated with the pressure with has now improved. He denies systemic signs of infection.

## 2024-12-10 NOTE — TELEPHONE ENCOUNTER
Called pt x2. Unable to lvm. Called pt's mother. LVM. Will send [employer] message. Pt went to be evaluated at the ER on 12/9/24 at 10:56 pm. Pt was discharged also.

## 2024-12-10 NOTE — TELEPHONE ENCOUNTER
Called and spoke to the pt. He was seen in the ER on yesterday 12/9. Pt advised to continue dressing changes and to call back if any worsening symptoms occur. Pt v/u.

## 2024-12-10 NOTE — TELEPHONE ENCOUNTER
Looks like he filled out an E visit but mentioned he is bleeding from his surgical site, please get more details about what is going on and please encourage him to reach out to his surgeon.  I recommend he be evaluated in person.

## 2024-12-10 NOTE — TELEPHONE ENCOUNTER
Pt reports he had cholecystectomy done on 12/2/24, but tonight had some bleeding from the incision near his navel. Not currently bleeding, denies any fever, worsening pain, or s/sx of infection. Pt advised to see a MD within 24 hours per protocol, pt plans to call surgery office in the morning, was informed a message will be routed as well. Pt encouraged to call back with any worsening symptoms or questions. He verbalized understanding.    Reason for Disposition   [1] Clear or blood-tinged fluid draining from wound AND [2] no fever    Additional Information   Negative: [1] Major abdominal surgical incision AND [2] wound gaping open AND [3] visible internal organs   Negative: Sounds like a life-threatening emergency to the triager   Negative: [1] Bleeding from incision AND [2] won't stop after 10 minutes of direct pressure   Negative: [1] Bleeding (more than a few drops) from incision AND [2] tracheostomy or blood vessel surgery (e.g., carotid endarterectomy, femoral bypass graft, kidney dialysis fistula)   Negative: [1] Widespread rash AND [2] bright red, sunburn-like   Negative: Severe pain in the incision   Negative: [1] Incision gaping open AND [2] < 48 hours since wound re-opened   Negative: [1] Incision gaping open AND [2] length of opening > 2 inches (5 cm)   Negative: Patient sounds very sick or weak to the triager   Negative: Sounds like a serious complication to the triager   Negative: Fever > 100.4 F (38.0 C)   Negative: [1] Incision looks infected (e.g., spreading redness, pus) AND [2] fever > 99.5 F (37.5 C)   Negative: [1] Incision looks infected (e.g., spreading redness, pus) AND [2] large red area (> 2 in. or 5 cm)   Negative: [1] Incision looks infected (e.g., spreading redness, pus) AND [2] face wound   Negative: [1] Red streak runs from the incision AND [2] longer than 1 inch (2.5 cm)   Negative: [1] Pus or bad-smelling fluid draining from incision AND [2] no fever   Negative: [1] Post-op pain AND  [2] not controlled with pain medications   Negative: Dressing soaked with blood or body fluid (e.g., drainage)   Negative: [1] Scant bleeding (e.g., few drops) from incision AND [2] tracheostomy or blood vessel surgery (e.g., carotid endarterectomy, femoral bypass graft, kidney dialysis fistula)   Negative: [1] Raised bruise and [2] size > 2 inches (5 cm) and expanding   Negative: [1] Caller has URGENT question AND [2] triager unable to answer question   Negative: Sounds like a life-threatening emergency to the triager   Negative: [1] INCREASING pain in incision AND [2] > 2 days (48 hours) since surgery   Negative: [1] Small red area or streak AND [2] no fever    Protocols used: Post-Op Symptoms and [id number], Post-Op Incision Symptoms and [id number]

## 2024-12-10 NOTE — TELEPHONE ENCOUNTER
Spoke with pt this morning who reports that he has kept a bandage on it since initial phone call. Very minimal drainage noted since then. Denies pain or any other s/ of infection. Instructed to keep area clean and dry and continue to monitor for any s/s of infection . Keep 12/17 post-op appt. Voiced understanding.

## 2024-12-10 NOTE — DISCHARGE INSTRUCTIONS
You were seen in the [facility] ED for concerns related to your surgical incision site.  Your laboratory studies and physical exam both reassuring.  You were evaluated by General surgery team who did not see reason for hospital admission or intervention at this time but recommended follow up outpatient in their clinic.  Please follow up with your primary care provider as well as your surgeon in the next coming days.  Please return to the ED if you were to experience worsening symptoms, abdominal pain, nausea or vomiting or fever.

## 2024-12-10 NOTE — ED PROVIDER NOTES
Encounter Date: 12/9/2024       History     Chief Complaint   Patient presents with    Wound Check     Per EMS pt from home, pt had gallbladder laparoscopic performed Monday, umbilical incision bleeding 2 hours. GCS 15.     Josef Sage is a 49 y.o. male who has a past medical history of Anxiety, Asthma, Bronchitis, Cervical spondylosis with radiculopathy (05/10/2023), Mixed hyperlipidemia (09/17/2019), Recurrent upper respiratory infection (URI), and Wheezes.    The patient presents to the ED due to bleeding from the umbilical incision from his got back cholecystectomy performed last Monday.  Patient states that he has had roughly 5-10 mils blood seeping from incision site with a accompanied being around the umbilicus.  Abdominal distention, abdominal pain, fever, nausea, vomiting, chills diaphoresis this.  Patient was concerned enough of what he was seeing that he wanted to be taken to the emergency room for evaluation.  Patient arrived via EMS as he has taken his prescribed opioids post and was unable to drive himself.    The history is provided by the patient, the EMS personnel and medical records. No  was used.     Review of patient's allergies indicates:   Allergen Reactions    Sulfa (sulfonamide antibiotics) Rash     Past Medical History:   Diagnosis Date    Anxiety     Asthma     Bronchitis     Cervical spondylosis with radiculopathy 05/10/2023    Mixed hyperlipidemia 09/17/2019    Recurrent upper respiratory infection (URI)     Wheezes      Past Surgical History:   Procedure Laterality Date    ANTERIOR CERVICAL DISCECTOMY W/ FUSION N/A 05/10/2023    Procedure: C5-6 ACDF;  Surgeon: Ivan Dalton MD;  Location: [facility] OR [address] [place] [address];  Service: Neurosurgery;  Laterality: N/A;  C5-6 ACDF  anesthesia: general  nerve mon: EMG,SEP, MEP  radiology; C-ARM  bed: regular slider  headrest: MINDI eddy tongs/hanging weights/ surgical pillow  misc; interbody paddle distractor, nerve root retractor  (depuy)    LAPAROSCOPIC CHOLECYSTECTOMY N/A 12/2/2024    Procedure: CHOLECYSTECTOMY, LAPAROSCOPIC;  Surgeon: Sahil James MD;  Location: [facility] OR [address] [place] [address];  Service: General;  Laterality: N/A;  with intraoperative cholangiogram    MOUTH SURGERY      wisdom teeth    None       Family History   Problem Relation Name Age of Onset    Hypertension Mother Sagar Leal MD     Hypertension Father Mohit S Mel MASSEY     Eczema Sister      Cancer Maternal Cousin          colon    Heart disease Neg Hx      Hyperlipidemia Neg Hx      Diabetes Neg Hx       Social History     Tobacco Use    Smoking status: Never    Smokeless tobacco: Never   Substance Use Topics    Alcohol use: Not Currently     Alcohol/week: 1.0 standard drink of alcohol     Types: 1 Glasses of wine per week    Drug use: No     Comment: CBD gummies     Review of Systems   All other systems reviewed and are negative.      Physical Exam     Initial Vitals [12/09/24 2227]   BP Pulse Resp Temp SpO2   (!) 147/111 97 16 98.3 °F (36.8 °C) 97 %      MAP       --         Physical Exam    Nursing note and vitals reviewed.  Constitutional: He appears well-developed and well-nourished.   HENT:   Head: Normocephalic and atraumatic.   Eyes: Conjunctivae and EOM are normal. Pupils are equal, round, and reactive to light.   Neck: Neck supple.   Normal range of motion.  Cardiovascular:  Normal rate, regular rhythm, normal heart sounds and intact distal pulses.           Pulmonary/Chest: Breath sounds normal.   Abdominal: Abdomen is soft. Bowel sounds are normal. He exhibits no distension and no mass. There is abdominal tenderness.   Marked ecchymosis circumferentially of the umbilicus.  Umbilical trocar incision site with bond overlying area mild oozing of blood with clot formation.  There was no rebound tenderness or signs of peritonitis. There is no rebound and no guarding.   Musculoskeletal:         General: Normal range of motion.      Cervical back:  Normal range of motion and neck supple.     Neurological: He is alert and oriented to person, place, and time. He has normal strength. GCS score is 15. GCS eye subscore is 4. GCS verbal subscore is 5. GCS motor subscore is 6.   Skin: Skin is warm and dry. Capillary refill takes less than 2 seconds.   Psychiatric: He has a normal mood and affect. His behavior is normal. Judgment and thought content normal.       ED Course   Procedures  Labs Reviewed   CBC W/ AUTO DIFFERENTIAL - Abnormal       Result Value    WBC 9.34      RBC 4.34 (*)     Hemoglobin 12.8 (*)     Hematocrit 37.2 (*)     MCV 86      MCH 29.5      MCHC 34.4      RDW 12.5      Platelets 356      MPV 9.5      Immature Granulocytes 1.3 (*)     Gran # (ANC) 6.5      Immature Grans (Abs) 0.12 (*)     Lymph # 1.7      Mono # 0.7      Eos # 0.3      Baso # 0.08      nRBC 0      Gran % 69.1      Lymph % 18.4      Mono % 7.1      Eosinophil % 3.2      Basophil % 0.9      Differential Method Automated     COMPREHENSIVE METABOLIC PANEL - Abnormal    Sodium 139      Potassium 4.0      Chloride 107      CO2 22 (*)     Glucose 105      BUN 11      Creatinine 0.8      Calcium 9.2      Total Protein 7.7      Albumin 3.6      Total Bilirubin 0.4      Alkaline Phosphatase 102      AST 25      ALT 76 (*)     eGFR >60.0      Anion Gap 10     LIPASE - Abnormal    Lipase 72 (*)    TYPE & SCREEN    Group & Rh O POS      Indirect Kyle NEG      Specimen Outdate 12/12/2024 23:59       EKG Readings: (Independently Interpreted)   Initial Reading: No STEMI. Rhythm: Normal Sinus Rhythm. Ectopy: No Ectopy. Conduction: Normal. ST Segments: Normal ST Segments. T Waves: Normal. Clinical Impression: Normal Sinus Rhythm     ECG Results              EKG 12-lead (Final result)        Collection Time Result Time QRS Duration OHS QTC Calculation    12/09/24 23:38:51 12/10/24 09:35:42 78 406                     Final result by Interface, Lab In [facility] (12/10/24 09:36:42)                    Narrative:    Test Reason : R52,    Vent. Rate :  86 BPM     Atrial Rate :  86 BPM     P-R Int : 128 ms          QRS Dur :  78 ms      QT Int : 340 ms       P-R-T Axes :  48  58  42 degrees    QTcB Int : 406 ms    Normal sinus rhythm  Normal ECG  When compared with ECG of 01-Dec-2024 08:03,  No significant change was found  Confirmed by Darrin Lamar ([id number]) on 12/10/2024 9:35:40 AM    Referred By: AAAREFERRAL SELF           Confirmed By: Darrin Lamar                                  Imaging Results    None          Medications - No data to display  Medical Decision Making  49-year-old male as described above presenting with possible wound dehiscence of his umbilical trocar incision site.  Recently underwent laparoscopic cholecystectomy on Monday without complications.  Physical exam grossly normal with the exceptions as above.  Basic labs and consult with surgery for evaluation of incision site integrity.    Update/re-evaluation:  Laboratory studies grossly normal with a stable H&H 12.8/37.2.  EKG normal.  Patient in no distress and no acute pain.  Discussed the case with General surgery who evaluated patient and found no indications for surgical intervention or hospitalization at this point.  They advised outpatient follow-up.  Patient was updated on these findings and in agreement with discharge home and will follow up within the week with primary care provider and surgeon.  VSS/HDS and afebrile on re-evaluation.    Amount and/or Complexity of Data Reviewed  Labs: ordered. Decision-making details documented in ED Course.  ECG/medicine tests:  Decision-making details documented in ED Course.  Discussion of management or test interpretation with external provider(s): I discussed the case with General surgery who agreed to evaluate patient.  Appreciate recs              Attending Attestation:   Physician Attestation Statement for Resident:  As the supervising MD   Physician Attestation Statement: I have personally seen  and examined this patient.   I agree with the above history.  -:   As the supervising MD I agree with the above PE.     As the supervising MD I agree with the above treatment, course, plan, and disposition.                  ED Course as of 12/11/24 0026   Tue Dec 10, 2024   0045 EKG 12-lead  EKG independently interpreted by me.    Performed: 12/10/2024   Rate/Rhythm/Axis: 86 bpm, sinus rhythm, normal axis  QRS 78 ms  Qtc 406 ms  Impression:  Normal Sinus Rhythm.  EKG without evidence of Na channel blockade, K channel blockade, there is no prolonged QTc or digoxin effect.  There is no STEMI or signs of ischemia. [JL]   0046 CBC auto differential(!)  Steatosis or left shift to indicate systemic infection.  Stable H&H with no anemia, 12.8/372 [JL]   0046 Lipase(!)  Moderately elevated lipase however appropriate for status post cholecystectomy. [JL]   0046 General surgery agreed to evaluate patient and believes patient is safe for discharge with follow-up outpatient in their clinic.  No further need for hospital admit or surgical interventions at this time. [JL]      ED Course User Index  [JL] Mike Franco MD                           Clinical Impression:  Final diagnoses:  [R52] Pain  [T81.31XA] Postoperative wound breakdown, initial encounter (Primary)          ED Disposition Condition    Discharge Stable          ED Prescriptions    None       Follow-up Information       Follow up With Specialties Details Why Contact Info    Ambreen Prather MD Family Medicine Schedule an appointment as soon as possible for a visit in 2 days  [address] [place] [address]  [address]  [place] [address]  [phone]      [facility] - Emergency Dept Emergency Medicine Go to  As needed [address]  [facility] [address] [phone]             Mike Franco MD  Resident  12/10/24 0813       Elma Do MD  12/11/24 0026

## 2024-12-10 NOTE — TELEPHONE ENCOUNTER
- Blood cultures 6/20 and repeat 6/21 positive for Staph Epi. One of two blood cultures from 6/23 positive for Staph (taken prior to line exchange). Repeat cultures sent 6/25 NGTD.   - IJ exchanged on 6/23, IABP exchanged 6/23  - ID recommended 14 day course of vancomycin from VAD implantation on 6/29 with end date: 7/12/22. Vancomycin discontinued per CTS with concerns for elevated sCr. ID with new recs to complete 7d course of daptomycin, which was completed 7/7/22     Called pt. Unable to lvm. Pt

## 2024-12-10 NOTE — ASSESSMENT & PLAN NOTE
50 yo M s/p yu wood with our team on 12/2 presenting after a likely small, subcutaneous hematoma at his umbilicus decompressed through the incision. He is otherwise doing well.    OK for discharge from the ED pending his labs  OK to maintain follow-up as scheduled. Dry gauze to incision as needed

## 2025-01-16 ENCOUNTER — TELEPHONE (OUTPATIENT)
Dept: PRIMARY CARE CLINIC | Facility: CLINIC | Age: 50
End: 2025-01-16
Payer: COMMERCIAL

## 2025-01-16 NOTE — TELEPHONE ENCOUNTER
Pt wanted to let you know he had gallbladder surgery a month ago, and since then he has itching very severely on his torso. Pt denies any other symptoms, states he just wanted to let you know that he has been itching since the surgery on December 2nd. Pt would like to know if there are any labs that can be ordered to determine why the itchiness has been going on.     Appt scheduled for 01/23/2025 @ 8:40am.     Please advise.

## 2025-01-16 NOTE — TELEPHONE ENCOUNTER
----- Message from Mariel sent at 1/16/2025 10:12 AM CST -----  Name of Who is Calling:DYLAN CULP [[id number]]        What is the request in detail: pt will like a call back regarding and procedure he just has please advise thank you         Can the clinic reply by [facility]:call back         What Number [facility] Back [facility]: Telephone Information:  Mobile          [phone]

## 2025-01-22 ENCOUNTER — PATIENT MESSAGE (OUTPATIENT)
Dept: PRIMARY CARE CLINIC | Facility: CLINIC | Age: 50
End: 2025-01-22
Payer: COMMERCIAL

## 2025-01-23 ENCOUNTER — PATIENT MESSAGE (OUTPATIENT)
Dept: PRIMARY CARE CLINIC | Facility: CLINIC | Age: 50
End: 2025-01-23

## 2025-01-23 ENCOUNTER — PATIENT MESSAGE (OUTPATIENT)
Dept: PRIMARY CARE CLINIC | Facility: CLINIC | Age: 50
End: 2025-01-23
Payer: COMMERCIAL

## 2025-01-23 ENCOUNTER — OFFICE VISIT (OUTPATIENT)
Dept: PRIMARY CARE CLINIC | Facility: CLINIC | Age: 50
End: 2025-01-23
Payer: COMMERCIAL

## 2025-01-23 DIAGNOSIS — Z13.6 ENCOUNTER FOR SCREENING FOR CARDIOVASCULAR DISORDERS: ICD-10-CM

## 2025-01-23 DIAGNOSIS — Z00.00 ANNUAL PHYSICAL EXAM: ICD-10-CM

## 2025-01-23 DIAGNOSIS — L65.9 ALOPECIA: ICD-10-CM

## 2025-01-23 DIAGNOSIS — Z53.20 STATIN MEDICATION DECLINED BY PATIENT: ICD-10-CM

## 2025-01-23 DIAGNOSIS — Z12.11 SCREEN FOR COLON CANCER: ICD-10-CM

## 2025-01-23 DIAGNOSIS — L30.8 PRURITIC DERMATITIS: Primary | ICD-10-CM

## 2025-01-23 DIAGNOSIS — Z90.49 S/P CHOLECYSTECTOMY: ICD-10-CM

## 2025-01-23 DIAGNOSIS — K76.0 FATTY LIVER: ICD-10-CM

## 2025-01-23 DIAGNOSIS — E55.9 VITAMIN D DEFICIENCY: ICD-10-CM

## 2025-01-23 DIAGNOSIS — E78.5 HYPERLIPIDEMIA, UNSPECIFIED HYPERLIPIDEMIA TYPE: ICD-10-CM

## 2025-01-23 DIAGNOSIS — R74.8 ELEVATED LIPASE: ICD-10-CM

## 2025-01-23 RX ORDER — PREDNISONE 20 MG/1
40 TABLET ORAL DAILY
Qty: 6 TABLET | Refills: 0 | Status: SHIPPED | OUTPATIENT
Start: 2025-01-23 | End: 2025-01-26

## 2025-01-23 RX ORDER — HYDROXYZINE HYDROCHLORIDE 25 MG/1
25 TABLET, FILM COATED ORAL 3 TIMES DAILY PRN
Qty: 30 TABLET | Refills: 0 | Status: SHIPPED | OUTPATIENT
Start: 2025-01-23

## 2025-01-23 RX ORDER — CHOLECALCIFEROL (VITAMIN D3) 25 MCG
1000 TABLET ORAL DAILY
COMMUNITY

## 2025-01-23 RX ORDER — TRIAMCINOLONE ACETONIDE 1 MG/G
OINTMENT TOPICAL 3 TIMES DAILY PRN
Qty: 80 G | Refills: 0 | Status: SHIPPED | OUTPATIENT
Start: 2025-01-23

## 2025-01-23 RX ORDER — FINASTERIDE 1 MG/1
1 TABLET, FILM COATED ORAL DAILY
Qty: 30 TABLET | Refills: 0 | Status: SHIPPED | OUTPATIENT
Start: 2025-01-23

## 2025-01-23 NOTE — PROGRESS NOTES
Subjective:       Patient ID: Josef Sage is a 49 y.o. male.    Chief Complaint: Rash    Rash  This is a new problem. The current episode started 1 to 4 weeks ago. The problem has been waxing and waning since onset. The affected locations include the torso. The rash is characterized by redness, itchiness and scaling. He was exposed to nothing. Pertinent negatives include no anorexia, congestion, cough, diarrhea, eye pain, facial edema, fatigue, fever, joint pain, nail changes, rhinorrhea, shortness of breath, sore throat or vomiting. Past treatments include anti-itch cream, antihistamine, cold compress and moisturizer. The treatment provided mild relief. His past medical history is significant for asthma. There is no history of allergies, eczema or varicella.       50 y/o male with Mixed anxiety and depression, Asthma, HLPD, Fatty liver, Alopecia, hx of IBS, Acne Vulgaris, Vitamin D deficiency is here with a rash.    He is s/p cholecystectomy 12/2024    He has a rash x 2 weeks, its only on his back and flank, its itchy raised bumps some have scabs, never fluid or pus filled, he is OTC Cerave, OTC Cortisone, he is taking Xyzal and Pepcid, no recent travel, no recent illness, and no one else has a rash. He is feeling good in general, he denies f/n/v/d/constipation/sob/urinary sx. He is trying to eat healthy and more protein. He has not been very active since his surgery, he has not needed oxycodone since 2 days post op, has taken Ibuprofen a few times but none lately. Not sleeping well secondary to the itching.    He is taking Semaglutide 1 mg weekly from [place] [facility] for the past year, post op he decreased to 0.5 mg weekly, his total weight loss is about 30 pounds.    He has been on IM D3 from outside clinic    Mixed anxiety and depression: Lexapro 10 mg daily  Asthma: was on advair in the past but stopped, albuterol as needed, nebulizer at home uses when needed  Vitamin D deficiency: on ,000 IU D3  HLPD:  denies family hx of early CAD or HLPD, declined statin, willing for CT score  Hx of IBS: scope reported as normal in his 30's, following with GI in the past, willing for scope  Optho: Dr. Trammell at [facility] did laser for Retina, visit due  Alopenic/Acen: following with Dr. Joycelyn Giron, propecia 0.5 mg daily, using topicals finasteride and minoxidil as well    The 10-year ASCVD risk score (Sarah DK, et al., 2019) is: 7.3%    Values used to calculate the score:      Age: 49 years      Sex: Male      Is Non- : No      Diabetic: No      Tobacco smoker: No      Systolic Blood Pressure: 150 mmHg      Is BP treated: No      HDL Cholesterol: 44 mg/dL      Total Cholesterol: 300 mg/dL      Review of Systems   Constitutional:  Negative for fatigue and fever.   HENT:  Negative for congestion, rhinorrhea and sore throat.    Eyes:  Negative for pain.   Respiratory:  Negative for cough and shortness of breath.    Gastrointestinal:  Negative for anorexia, diarrhea and vomiting.   Musculoskeletal:  Negative for joint pain.   Skin:  Positive for rash. Negative for nail changes.     see HPI  Objective:      There were no vitals taken for this visit.  Physical Exam  Constitutional:       General: He is not in acute distress.     Appearance: He is well-developed. He is not diaphoretic.   HENT:      Head: Normocephalic and atraumatic.   Pulmonary:      Effort: No respiratory distress.   Neurological:      Mental Status: He is alert and oriented to person, place, and time.         Assessment:       1. Pruritic dermatitis    2. Alopecia    3. Encounter for screening for cardiovascular disorders    4. Hyperlipidemia, unspecified hyperlipidemia type    5. S/P cholecystectomy    6. Vitamin D deficiency    7. Fatty liver    8. Screen for colon cancer    9. Annual physical exam    10. Elevated lipase        Plan:   Josef was seen today for rash.    Diagnoses and all orders for this visit:    Pruritic dermatitis  -      Comprehensive Metabolic Panel; Future  -     CBC Auto Differential; Future  -     triamcinolone acetonide 0.1% (KENALOG) 0.1 % ointment; Apply topically 3 (three) times daily as needed.  -     hydrOXYzine HCL (ATARAX) 25 MG tablet; Take 1 tablet (25 mg total) by mouth 3 (three) times daily as needed for Itching.  -     predniSONE (DELTASONE) 20 MG tablet; Take 2 tablets (40 mg total) by mouth once daily. for 3 days  -     Hemoglobin A1C; Future    Alopecia  -     finasteride (PROPECIA) 1 mg tablet; Take 1 tablet (1 mg total) by mouth once daily.    Encounter for screening for cardiovascular disorders  -     CT Cardiac Scoring; Future    Hyperlipidemia, unspecified hyperlipidemia type  -     CT Cardiac Scoring; Future  -     Apolipoprotein B; Future  -     LIPOPROTEIN A (LPA); Future  -     Lipid Panel; Future  -     Ambulatory referral/consult to Cardiology; Future  -     Hemoglobin A1C; Future    S/P cholecystectomy  -     LIPASE; Future    Vitamin D deficiency  -     Vitamin D; Future    Fatty liver  -     Ambulatory referral/consult to Hepatology; Future  -     Hemoglobin A1C; Future    Screen for colon cancer  -     Ambulatory referral/consult to Endo Procedure ; Future    Annual physical exam  -     Comprehensive Metabolic Panel; Future  -     CBC Auto Differential; Future  -     Vitamin D; Future  -     LIPASE; Future  -     Apolipoprotein B; Future  -     LIPOPROTEIN A (LPA); Future  -     Lipid Panel; Future  -     TSH; Future  -     Hemoglobin A1C; Future    Elevated lipase  -     LIPASE; Future    The patient location is: [place]  The chief complaint leading to consultation is: rash    Visit type: audiovisual    Face to Face time with patient: 40 minutes of total time spent on the encounter, which includes face to face time and non-face to face time preparing to see the patient (eg, review of tests), Obtaining and/or reviewing separately obtained history, Documenting clinical information in the  electronic or other health record, Independently interpreting results (not separately reported) and communicating results to the patient/family/caregiver, or Care coordination (not separately reported).         Each patient to whom he or she provides medical services by telemedicine is:  (1) informed of the relationship between the physician and patient and the respective role of any other health care provider with respect to management of the patient; and (2) notified that he or she may decline to receive medical services by telemedicine and may withdraw from such care at any time.    Notes:

## 2025-02-19 ENCOUNTER — TELEPHONE (OUTPATIENT)
Dept: PRIMARY CARE CLINIC | Facility: CLINIC | Age: 50
End: 2025-02-19
Payer: COMMERCIAL

## 2025-02-19 NOTE — TELEPHONE ENCOUNTER
----- Message from Kiera sent at 2/19/2025  9:25 AM CST -----  Contact: Patient  [phone]  .[email or web address]    could dr call in meds?Patient is calling for Medical Advice regarding:cough,cold,runny noseFever, tiredHow long has patient had these symptoms:Pharmacy name and phone#:[id number] - [facility] - [id number] [address]: [phone] Fax: [phone] Patient wants a call back or thru [facility]:Patient  [phone]:Please advise patient replies from provider may take up to 48 hours.

## 2025-03-05 ENCOUNTER — PATIENT MESSAGE (OUTPATIENT)
Dept: PRIMARY CARE CLINIC | Facility: CLINIC | Age: 50
End: 2025-03-05
Payer: COMMERCIAL

## 2025-03-06 ENCOUNTER — LAB VISIT (OUTPATIENT)
Dept: LAB | Facility: HOSPITAL | Age: 50
End: 2025-03-06
Attending: FAMILY MEDICINE
Payer: COMMERCIAL

## 2025-03-06 DIAGNOSIS — E55.9 VITAMIN D DEFICIENCY: ICD-10-CM

## 2025-03-06 DIAGNOSIS — K76.0 FATTY LIVER: ICD-10-CM

## 2025-03-06 DIAGNOSIS — Z00.00 ANNUAL PHYSICAL EXAM: ICD-10-CM

## 2025-03-06 DIAGNOSIS — E78.5 HYPERLIPIDEMIA, UNSPECIFIED HYPERLIPIDEMIA TYPE: ICD-10-CM

## 2025-03-06 DIAGNOSIS — Z90.49 S/P CHOLECYSTECTOMY: ICD-10-CM

## 2025-03-06 DIAGNOSIS — R74.8 ELEVATED LIPASE: ICD-10-CM

## 2025-03-06 DIAGNOSIS — L30.8 PRURITIC DERMATITIS: ICD-10-CM

## 2025-03-06 LAB
BASOPHILS # BLD AUTO: 0.05 K/UL (ref 0–0.2)
BASOPHILS NFR BLD: 0.7 % (ref 0–1.9)
DIFFERENTIAL METHOD BLD: NORMAL
EOSINOPHIL # BLD AUTO: 0.3 K/UL (ref 0–0.5)
EOSINOPHIL NFR BLD: 3.8 % (ref 0–8)
ERYTHROCYTE [DISTWIDTH] IN BLOOD BY AUTOMATED COUNT: 12.1 % (ref 11.5–14.5)
ESTIMATED AVG GLUCOSE: 100 MG/DL (ref 68–131)
HBA1C MFR BLD: 5.1 % (ref 4–5.6)
HCT VFR BLD AUTO: 45.5 % (ref 40–54)
HGB BLD-MCNC: 15 G/DL (ref 14–18)
IMM GRANULOCYTES # BLD AUTO: 0.01 K/UL (ref 0–0.04)
IMM GRANULOCYTES NFR BLD AUTO: 0.1 % (ref 0–0.5)
LYMPHOCYTES # BLD AUTO: 3.2 K/UL (ref 1–4.8)
LYMPHOCYTES NFR BLD: 45.4 % (ref 18–48)
MCH RBC QN AUTO: 28 PG (ref 27–31)
MCHC RBC AUTO-ENTMCNC: 33 G/DL (ref 32–36)
MCV RBC AUTO: 85 FL (ref 82–98)
MONOCYTES # BLD AUTO: 0.5 K/UL (ref 0.3–1)
MONOCYTES NFR BLD: 6.8 % (ref 4–15)
NEUTROPHILS # BLD AUTO: 3 K/UL (ref 1.8–7.7)
NEUTROPHILS NFR BLD: 43.2 % (ref 38–73)
NRBC BLD-RTO: 0 /100 WBC
PLATELET # BLD AUTO: 293 K/UL (ref 150–450)
PMV BLD AUTO: 9.9 FL (ref 9.2–12.9)
RBC # BLD AUTO: 5.36 M/UL (ref 4.6–6.2)
WBC # BLD AUTO: 7.03 K/UL (ref 3.9–12.7)

## 2025-03-06 PROCEDURE — 83695 ASSAY OF LIPOPROTEIN(A): CPT | Performed by: FAMILY MEDICINE

## 2025-03-06 PROCEDURE — 80061 LIPID PANEL: CPT | Performed by: FAMILY MEDICINE

## 2025-03-06 PROCEDURE — 80053 COMPREHEN METABOLIC PANEL: CPT | Performed by: FAMILY MEDICINE

## 2025-03-06 PROCEDURE — 85025 COMPLETE CBC W/AUTO DIFF WBC: CPT | Performed by: FAMILY MEDICINE

## 2025-03-06 PROCEDURE — 82306 VITAMIN D 25 HYDROXY: CPT | Performed by: FAMILY MEDICINE

## 2025-03-06 PROCEDURE — 84443 ASSAY THYROID STIM HORMONE: CPT | Performed by: FAMILY MEDICINE

## 2025-03-06 PROCEDURE — 83036 HEMOGLOBIN GLYCOSYLATED A1C: CPT | Performed by: FAMILY MEDICINE

## 2025-03-06 PROCEDURE — 83690 ASSAY OF LIPASE: CPT | Performed by: FAMILY MEDICINE

## 2025-03-06 PROCEDURE — 82172 ASSAY OF APOLIPOPROTEIN: CPT | Performed by: FAMILY MEDICINE

## 2025-03-07 LAB
25(OH)D3+25(OH)D2 SERPL-MCNC: 31 NG/ML (ref 30–96)
ALBUMIN SERPL BCP-MCNC: 4 G/DL (ref 3.5–5.2)
ALP SERPL-CCNC: 65 U/L (ref 40–150)
ALT SERPL W/O P-5'-P-CCNC: 20 U/L (ref 10–44)
ANION GAP SERPL CALC-SCNC: 9 MMOL/L (ref 8–16)
AST SERPL-CCNC: 36 U/L (ref 10–40)
BILIRUB SERPL-MCNC: 0.5 MG/DL (ref 0.1–1)
BUN SERPL-MCNC: 16 MG/DL (ref 6–20)
CALCIUM SERPL-MCNC: 9.3 MG/DL (ref 8.7–10.5)
CHLORIDE SERPL-SCNC: 107 MMOL/L (ref 95–110)
CHOLEST SERPL-MCNC: 207 MG/DL (ref 120–199)
CHOLEST/HDLC SERPL: 6.3 {RATIO} (ref 2–5)
CO2 SERPL-SCNC: 25 MMOL/L (ref 23–29)
CREAT SERPL-MCNC: 0.9 MG/DL (ref 0.5–1.4)
EST. GFR  (NO RACE VARIABLE): >60 ML/MIN/1.73 M^2
GLUCOSE SERPL-MCNC: 92 MG/DL (ref 70–110)
HDLC SERPL-MCNC: 33 MG/DL (ref 40–75)
HDLC SERPL: 15.9 % (ref 20–50)
LDLC SERPL CALC-MCNC: 129.6 MG/DL (ref 63–159)
LIPASE SERPL-CCNC: 33 U/L (ref 4–60)
NONHDLC SERPL-MCNC: 174 MG/DL
POTASSIUM SERPL-SCNC: 4.6 MMOL/L (ref 3.5–5.1)
PROT SERPL-MCNC: 7.4 G/DL (ref 6–8.4)
SODIUM SERPL-SCNC: 141 MMOL/L (ref 136–145)
TRIGL SERPL-MCNC: 222 MG/DL (ref 30–150)
TSH SERPL DL<=0.005 MIU/L-ACNC: 1.66 UIU/ML (ref 0.4–4)

## 2025-03-08 LAB — APO B SERPL-MCNC: 122 MG/DL

## 2025-03-11 ENCOUNTER — PATIENT MESSAGE (OUTPATIENT)
Dept: PRIMARY CARE CLINIC | Facility: CLINIC | Age: 50
End: 2025-03-11
Payer: COMMERCIAL

## 2025-03-11 DIAGNOSIS — R09.82 POST-NASAL DRIP: Primary | ICD-10-CM

## 2025-03-11 DIAGNOSIS — R05.9 COUGH, UNSPECIFIED TYPE: ICD-10-CM

## 2025-03-11 LAB — LPA SERPL-MCNC: 54 MG/DL (ref 0–30)

## 2025-03-12 ENCOUNTER — RESULTS FOLLOW-UP (OUTPATIENT)
Dept: PRIMARY CARE CLINIC | Facility: CLINIC | Age: 50
End: 2025-03-12

## 2025-03-13 NOTE — TELEPHONE ENCOUNTER
Pt f/u on 2/19 telephone encounter and canceled e-visit, states that he continues to have sinus drip and cough    Requesting referral to ENT    Pended in encounter for review    LOV with Ambreen Prather MD , 1/23/2025 for rash

## 2025-03-19 ENCOUNTER — TELEPHONE (OUTPATIENT)
Dept: ENDOSCOPY | Facility: HOSPITAL | Age: 50
End: 2025-03-19

## 2025-03-19 ENCOUNTER — CLINICAL SUPPORT (OUTPATIENT)
Dept: ENDOSCOPY | Facility: HOSPITAL | Age: 50
End: 2025-03-19
Attending: FAMILY MEDICINE
Payer: COMMERCIAL

## 2025-03-19 DIAGNOSIS — Z12.11 SCREEN FOR COLON CANCER: ICD-10-CM

## 2025-03-19 NOTE — PLAN OF CARE
Called patient x4 to schedule Colonoscopy.  Each time the call went straight to a busy signal. This is the only number listed =  [phone].

## 2025-03-19 NOTE — TELEPHONE ENCOUNTER
Called patient x4 to schedule Colonoscopy.  Each time the call went straight to a busy signal. This is the only number listed =  [phone].

## 2025-03-25 ENCOUNTER — PATIENT MESSAGE (OUTPATIENT)
Dept: PRIMARY CARE CLINIC | Facility: CLINIC | Age: 50
End: 2025-03-25
Payer: COMMERCIAL

## 2025-03-25 NOTE — TELEPHONE ENCOUNTER
LOV with Ambreen Prather MD , 1/23/2025    Patient requesting earlier appointment nothing available without override

## 2025-04-24 ENCOUNTER — OFFICE VISIT (OUTPATIENT)
Dept: OTOLARYNGOLOGY | Facility: CLINIC | Age: 50
End: 2025-04-24
Payer: COMMERCIAL

## 2025-04-24 VITALS
BODY MASS INDEX: 25.64 KG/M2 | WEIGHT: 183.88 LBS | DIASTOLIC BLOOD PRESSURE: 82 MMHG | HEART RATE: 81 BPM | SYSTOLIC BLOOD PRESSURE: 116 MMHG

## 2025-04-24 DIAGNOSIS — J34.2 DEVIATED NASAL SEPTUM: ICD-10-CM

## 2025-04-24 DIAGNOSIS — Z00.00 PHYSICAL EXAM: ICD-10-CM

## 2025-04-24 DIAGNOSIS — J01.91 ACUTE RECURRENT SINUSITIS, UNSPECIFIED LOCATION: ICD-10-CM

## 2025-04-24 DIAGNOSIS — J34.3 NASAL TURBINATE HYPERTROPHY: ICD-10-CM

## 2025-04-24 DIAGNOSIS — R11.0 NAUSEA: ICD-10-CM

## 2025-04-24 DIAGNOSIS — J35.2 ADENOID HYPERTROPHY: ICD-10-CM

## 2025-04-24 DIAGNOSIS — R00.0 TACHYCARDIA: Primary | ICD-10-CM

## 2025-04-24 DIAGNOSIS — J31.0 CHRONIC RHINITIS: Primary | ICD-10-CM

## 2025-04-24 PROCEDURE — 1160F RVW MEDS BY RX/DR IN RCRD: CPT | Mod: CPTII,S$GLB,, | Performed by: OTOLARYNGOLOGY

## 2025-04-24 PROCEDURE — 3079F DIAST BP 80-89 MM HG: CPT | Mod: CPTII,S$GLB,, | Performed by: OTOLARYNGOLOGY

## 2025-04-24 PROCEDURE — 1159F MED LIST DOCD IN RCRD: CPT | Mod: CPTII,S$GLB,, | Performed by: OTOLARYNGOLOGY

## 2025-04-24 PROCEDURE — 3008F BODY MASS INDEX DOCD: CPT | Mod: CPTII,S$GLB,, | Performed by: OTOLARYNGOLOGY

## 2025-04-24 PROCEDURE — 3074F SYST BP LT 130 MM HG: CPT | Mod: CPTII,S$GLB,, | Performed by: OTOLARYNGOLOGY

## 2025-04-24 PROCEDURE — 99999 PR PBB SHADOW E&M-EST. PATIENT-LVL IV: CPT | Mod: PBBFAC,,, | Performed by: OTOLARYNGOLOGY

## 2025-04-24 PROCEDURE — 31231 NASAL ENDOSCOPY DX: CPT | Mod: S$GLB,,, | Performed by: OTOLARYNGOLOGY

## 2025-04-24 PROCEDURE — 3044F HG A1C LEVEL LT 7.0%: CPT | Mod: CPTII,S$GLB,, | Performed by: OTOLARYNGOLOGY

## 2025-04-24 PROCEDURE — 99214 OFFICE O/P EST MOD 30 MIN: CPT | Mod: 25,S$GLB,, | Performed by: OTOLARYNGOLOGY

## 2025-04-24 RX ORDER — ONDANSETRON 4 MG/1
4 TABLET, FILM COATED ORAL EVERY 8 HOURS PRN
Qty: 5 TABLET | Refills: 0 | Status: SHIPPED | OUTPATIENT
Start: 2025-04-24

## 2025-04-24 NOTE — LETTER
2025    Ambreen Prather MD  [address] [place] [address]         OTORHINOLARYNGOLOGY AND COMMUNICATION SCIENCES    System Chair  Navarro Kwon MD      Jose Francisco Tong MD, MPH    Chair, [facility]  Monica Spangler MD    Head & Neck Surgical Oncology  Magnus Fields MD, Section Head  MD Katharine Broussard MD Issam Eid, MD Emily Kamen, MD Brian Moore, MD Maggie Brignac, APRN, FNP-C    Facial Plastic and Reconstructive Surgery  MD JAROD Wilburn III, MD    Otology and Neurotology  Kalyan Stewart, MD Mario Murry, MD Tammy Daniel, [facility], FNP-C    Rhinology and Sinus Surgery  MD Jose Francisco Broussard MD, MPH  Allen Sullivan, PA-C    Otolaryngic Allergy  MARCUS Liu, MD Monica Spangler MD    Laryngology  Godwin Schmid MD    Sleep Surgery  MARCUS Liu, MD Gordy Shaw, MD Flaquita Pedroza MD    Pediatric Otolaryngology  Geneva Spears, MD Navarro Kwon, MD PAMELA Vincent, MD SILVIO Samuel, MD Robina Olvera, MD Sabrina Samuel, PA-C  Alka Knott, APRN, PNP-C    Comprehensive Otolaryngology  Laura Gottlieb, MD Clement Quintero, MD Trina Edwards, MD Rudy Black, MD MARCUS Liu, MD Elo Roach, MD Shelby Ott, MD Gordy Shaw, MD Flaquita Pedroza, MD Monica Spangler, MD Mary Bergeron, MD Jose Elias Francisco, MD Sachin Dejesus, MD Jazmine Hart, MD Saniya Claire, PA-C  Li Peck, APRN-CNP  Abbie Davis APRN, FNP-C  Kaia Jonas, FNP-C  Jeremiah Tapia, PA-C  Rosie Houston, APRN, FNP-C  Mary Giron, PA-C  Mariel Shannon, PA-C    Director, Audiology  JUNIE Coleman, CCC-A    Director, Speech-Language Pathology  Ivon Parada MA, CCC-SLP       Re:  Josef Sage  :  1975    Dear Dr. Prather,    Thank you for referring your patient, Josef Sage, to us for evaluation and treatment.  I have enclosed a copy of my clinic note for your review and records.  If you  have any questions please do not hesitate to contact our office.     Thank you for allowing me to participate in the care of your patient.    Sincerely,        Jose Francisco Tong MD, MPH, FACS    Director, Rhinology and Sinus Surgery  Department of Otorhinolaryngology  [facility]    Encl:  Progress note     [facility] [address] [place] [address]  phone [phone]  fax [phone]  [email or web address]

## 2025-04-24 NOTE — PROGRESS NOTES
Subjective:      Josef Sage is a 49 y.o. male who was referred to me by Dr. Ambreen Prather in consultation for  recurrent infections .    He relates a long history for many years of recurrent episodes of recurrent sore throats that progress to postnasal drip with nasal congestion and facial pressure.  These occur perhaps 6 times annually and resolve with antibiotics.  At baseline he notes persistent nasal congestion, greater on the right.    Current sinonasal medications include Flonase for many months.  The last course of antibiotics was  a course of antibiotics and steroids in  January 2025 .  He does not regularly use nasal decongestant sprays.    He recalls previously having allergy testing that was reportedly positive for minimally positive dust mites.    He relates a history of asthma which is currently managed with albuterol and sinulair infrequently.    He denies a history of reflux symptoms.    He denies a diagnosis of obstructive sleep apnea.     He has not had sinonasal surgery.  He has not had a tonsillectomy.    He does not recall a prior history of nasal trauma.    SNOT-22 score: : (Patient-Rptd) (P) 39  NOSE score:: (Patient-Rptd) (P) 65%  ETDQ-7 score:: (Patient-Rptd) (P) 1.1      Past Medical History  He has a past medical history of Anxiety, Asthma, Bronchitis, Cervical spondylosis with radiculopathy, Mixed hyperlipidemia, Recurrent upper respiratory infection (URI), and Wheezes.    Past Surgical History  He has a past surgical history that includes None; Mouth surgery; Anterior cervical discectomy w/ fusion (N/A, 05/10/2023); and Laparoscopic cholecystectomy (N/A, 12/2/2024).    Family History  His family history includes Cancer in his maternal cousin; Eczema in his sister; Hypertension in his father and mother.    Social History  He reports that he has never smoked. He has never used smokeless tobacco. He reports that he does not currently use alcohol after a past usage of about 1.0 standard  drink of alcohol per week. He reports that he does not use drugs.    Allergies  He is allergic to sulfa (sulfonamide antibiotics).    Medications   He has a current medication list which includes the following prescription(s): escitalopram oxalate, finasteride, fluticasone propionate, hydroxyzine hcl, semaglutide (weight loss), triamcinolone acetonide 0.1%, vitamin d, albuterol, fluticasone, multivitamin, and ondansetron.    Review of Systems     Constitutional: Negative for appetite change, chills, fatigue, fever and unexpected weight loss.      HENT: Positive for postnasal drip, sinus infection and sore throat.  Negative for ear discharge, ear infection, ear pain, facial swelling, hearing loss, mouth sores, nosebleeds, ringing in the ears, runny nose, sinus pressure, stuffy nose, tonsil infection, dental problems, trouble swallowing and voice change.      Eyes:  Negative for change in eyesight, eye drainage, eye itching and photophobia.     Respiratory:  Positive for cough and sleep apnea. Negative for shortness of breath, snoring and wheezing.      Cardiovascular:  Negative for chest pain, foot swelling, irregular heartbeat and swollen veins.     Gastrointestinal:  Positive for acid reflux. Negative for abdominal pain, constipation, diarrhea, heartburn and vomiting.     Genitourinary: Negative for difficulty urinating, sexual problems and frequent urination.     Musc: Positive for neck pain. Negative for aching joints, aching muscles and back pain.     Skin: Negative for rash.     Allergy: Positive for seasonal allergies. Negative for food allergies.     Endocrine: Negative for cold intolerance and heat intolerance.      Neurological: Negative for dizziness, headaches, light-headedness, seizures and tremors.      Hematologic: Negative for bruises/bleeds easily and swollen glands.      Psychiatric: Negative for decreased concentration, depression, nervous/anxious and sleep disturbance.               Objective:      /88 (BP Location: Left forearm, Patient Position: Sitting)   Pulse 99   Wt 83.4 kg (183 lb 13.8 oz)   BMI 25.64 kg/m²        Constitutional:   He appears well-developed. He is cooperative. Normal speech.  No hoarse voice.      Head:  Normocephalic. Salivary glands normal.  Facial strength is normal.      Ears:    Right Ear: No drainage or tenderness. Tympanic membrane is not perforated. Tympanic membrane mobility is normal. No middle ear effusion. No decreased hearing is noted.   Left Ear: No drainage or tenderness. Tympanic membrane is not perforated. Tympanic membrane mobility is normal.  No middle ear effusion. No decreased hearing is noted.     Nose:  Septal deviation present. No mucosal edema, rhinorrhea or polyps. No epistaxis. Turbinate hypertrophy.  Turbinates normal and no turbinate masses.  Right sinus exhibits no maxillary sinus tenderness and no frontal sinus tenderness. Left sinus exhibits no maxillary sinus tenderness and no frontal sinus tenderness.     Mouth/Throat  Oropharynx clear and moist without lesions or asymmetry and normal uvula midline. He does not have dentures. Normal dentition. No oral lesions or mucous membrane lesions. No oropharyngeal exudate or posterior oropharyngeal erythema. Tonsils present, +1.  Mirror exam not performed due to patient tolerance.  Mirror exam not performed due to patient tolerance.    Hardwick, noncryptic tonsils      Neck:  Neck normal without thyromegaly masses, asymmetry, normal tracheal structure, crepitus, and tenderness, thyroid normal, trachea normal and no adenopathy. Normal range of motion present.     He has no cervical adenopathy.     Cardiovascular:    Regular rhythm.              Pulmonary/Chest:   Effort normal.     Psychiatric:   He has a normal mood and affect. His speech is normal and behavior is normal.     Neurological:   No cranial nerve deficit.     Skin:   No rash noted.       Procedure    Nasal endoscopy performed.  See procedure  note.        Data Reviewed    WBC (K/uL)   Date Value   03/06/2025 7.03     Eosinophil % (%)   Date Value   03/06/2025 3.8     Eos # (K/uL)   Date Value   03/06/2025 0.3     Platelets (K/uL)   Date Value   03/06/2025 293     Glucose (mg/dL)   Date Value   03/06/2025 92     Total IgE (IU/mL)   Date Value   03/29/2023 70     Immunocaps type 0/1 DM and cockroach  All others negative    Low S pneumo titers in 2023    No sinus imaging available.       Assessment:     1. Chronic rhinitis    2. Acute recurrent sinusitis, unspecified location    3. Deviated nasal septum    4. Nasal turbinate hypertrophy    5. Adenoid hypertrophy    6. Nausea         Plan:     I had a long discussion with the patient regarding his condition and the further workup and management options.  He experienced a vasovagal episode and nausea that resolved spontaneously and was supplemented with zofran.  He does not recall having a pneumovax shot after the prior workup, so I ordered serologic testing for S pneumoniae titers to assess for deficient humoral immunity.  If those levels are in the nonprotective range, then a Pneumovax booster shot would be ordered, to be followed 6 to 8 weeks later by repeat testing.  I prescribed the daily use of Flonase Sensimist to treat sinonasal inflammation with a preferential delivery mechanism for the posterior nasal cavity and nasopharynx.  I briefly mentioned that surgical management may be considered, to consist of septoplasty, turbinate reduction and adenoidectomy.    Follow up for test results.

## 2025-04-29 ENCOUNTER — PATIENT MESSAGE (OUTPATIENT)
Dept: OTOLARYNGOLOGY | Facility: CLINIC | Age: 50
End: 2025-04-29
Payer: COMMERCIAL

## 2025-05-12 ENCOUNTER — TELEPHONE (OUTPATIENT)
Dept: CARDIOLOGY | Facility: CLINIC | Age: 50
End: 2025-05-12
Payer: COMMERCIAL

## 2025-07-09 ENCOUNTER — TELEPHONE (OUTPATIENT)
Dept: ENDOSCOPY | Facility: HOSPITAL | Age: 50
End: 2025-07-09
Payer: COMMERCIAL

## 2025-07-09 NOTE — TELEPHONE ENCOUNTER
Endoscopy Scheduling Department  [facility] [address] [place] [address]  Take the [facility] Elevators to 4th Floor Endoscopy Lab      Date: 07/09/2025      Medical Record # [id number]        Dear  Josef Sage      An order for the following procedure(s) Colonoscopy was placed for you by   Ambreen Prather MD.    Since multiple attempts have been made to get in touch with you, this is the last notification. Please call the scheduling nurse to schedule this procedure as soon as possible.    If you have already scheduled this appointment, please disregard this letter. If you would like to cancel this request, please call the number listed below.     Sincerely,        Endoscopy Scheduling Department [phone]        Comments: Office hours are Monday through Friday 8-430p.

## 2025-07-31 ENCOUNTER — LAB VISIT (OUTPATIENT)
Dept: LAB | Facility: HOSPITAL | Age: 50
End: 2025-07-31
Attending: DERMATOLOGY
Payer: COMMERCIAL

## 2025-07-31 DIAGNOSIS — L70.0 ACNE VULGARIS: Primary | ICD-10-CM

## 2025-07-31 LAB
ALBUMIN SERPL BCP-MCNC: 4.4 G/DL (ref 3.5–5.2)
ALP SERPL-CCNC: 67 UNIT/L (ref 40–150)
ALT SERPL W/O P-5'-P-CCNC: 49 UNIT/L (ref 0–55)
ANION GAP (OHS): 7 MMOL/L (ref 8–16)
AST SERPL-CCNC: 31 UNIT/L (ref 0–50)
BILIRUB SERPL-MCNC: 0.4 MG/DL (ref 0.1–1)
BUN SERPL-MCNC: 17 MG/DL (ref 6–20)
CALCIUM SERPL-MCNC: 9.1 MG/DL (ref 8.7–10.5)
CHLORIDE SERPL-SCNC: 108 MMOL/L (ref 95–110)
CHOLEST SERPL-MCNC: 224 MG/DL (ref 120–199)
CHOLEST/HDLC SERPL: 7.2 {RATIO} (ref 2–5)
CO2 SERPL-SCNC: 24 MMOL/L (ref 23–29)
CREAT SERPL-MCNC: 1.1 MG/DL (ref 0.5–1.4)
GFR SERPLBLD CREATININE-BSD FMLA CKD-EPI: >60 ML/MIN/1.73/M2
GLUCOSE SERPL-MCNC: 93 MG/DL (ref 70–110)
HDLC SERPL-MCNC: 31 MG/DL (ref 40–75)
HDLC SERPL: 13.8 % (ref 20–50)
LDLC SERPL CALC-MCNC: 150.8 MG/DL (ref 63–159)
NONHDLC SERPL-MCNC: 193 MG/DL
POTASSIUM SERPL-SCNC: 4.1 MMOL/L (ref 3.5–5.1)
PROT SERPL-MCNC: 7.4 GM/DL (ref 6–8.4)
SODIUM SERPL-SCNC: 139 MMOL/L (ref 136–145)
TRIGL SERPL-MCNC: 211 MG/DL (ref 30–150)

## 2025-07-31 PROCEDURE — 36415 COLL VENOUS BLD VENIPUNCTURE: CPT

## 2025-07-31 PROCEDURE — 80061 LIPID PANEL: CPT

## 2025-07-31 PROCEDURE — 80053 COMPREHEN METABOLIC PANEL: CPT

## (undated) DEVICE — BLADE 4IN EDGE INSULATED

## (undated) DEVICE — GOWN SMART IMP BREATHABLE XXLG

## (undated) DEVICE — STOPCOCK DISCOFIX 3 WAY

## (undated) DEVICE — TOWEL OR DISP STRL BLUE 4/PK

## (undated) DEVICE — BAG TISS RETRV MONARCH 10MM

## (undated) DEVICE — ADHESIVE DERMABOND ADVANCED

## (undated) DEVICE — GLOVE GAMMEX SURG LF PI SZ 7.5

## (undated) DEVICE — DRAPE STERI-DRAPE 1000 17X11IN

## (undated) DEVICE — PACK ECLIPSE SET-UP W/O DRAPE

## (undated) DEVICE — TUBING HF INSUFFLATION W/ FLTR

## (undated) DEVICE — IRRIGATOR ENDOSCOPY DISP.

## (undated) DEVICE — CORD BIPOLAR 12 FOOT

## (undated) DEVICE — KIT ANTIFOG W/SPONG & FLUID

## (undated) DEVICE — DRAPE C-ARM ELAS CLIP 42X120IN

## (undated) DEVICE — BUR BONE CUT MICRO TPS 3X3.8MM

## (undated) DEVICE — KIT SURGIFLO HEMOSTATIC MATRIX

## (undated) DEVICE — ELECTRODE REM PLYHSV RETURN 9

## (undated) DEVICE — SPONGE COTTON TRAY 4X4IN

## (undated) DEVICE — BLADE SURG CARBON STEEL SZ11

## (undated) DEVICE — KIT EVACUATOR 3-SPRING 1/8 DRN

## (undated) DEVICE — GAUZE SPONGE PEANUT STRL

## (undated) DEVICE — NDL SPINAL 18GX3.5 SPINOCAN

## (undated) DEVICE — DRAPE CORETEMP FLD WRM 56X62IN

## (undated) DEVICE — DRAPE T THYROID STERILE

## (undated) DEVICE — SUT MONOCRYL 4-0 PS-1 UND

## (undated) DEVICE — POWDER ARISTA AH 3G

## (undated) DEVICE — TUBE FRAZIER 5MM 2FT SOFT TIP

## (undated) DEVICE — TRAY MINOR GEN SURG OMC

## (undated) DEVICE — SUT CTD VICRYL 3-0 CR/SH

## (undated) DEVICE — SCISSOR 5MMX35CM DIRECT DRIVE

## (undated) DEVICE — TAPE CURAD SILK ADH 3INX10YD

## (undated) DEVICE — SYR 30CC LUER LOCK

## (undated) DEVICE — NDL HYPO REG 25G X 1 1/2

## (undated) DEVICE — SOL NACL 0.9% IV INJ 1000ML

## (undated) DEVICE — CLIPPER BLADE MOD 4406 (CAREF)

## (undated) DEVICE — TROCAR ENDOPATH XCEL 5X75MM

## (undated) DEVICE — MARKER SKIN STND TIP BLUE BARR

## (undated) DEVICE — DRESSING SURGICAL 1/2X1/2

## (undated) DEVICE — TROCAR SPACEMAKER BLUNT 10MM

## (undated) DEVICE — DRAPE INCISE IOBAN 2 23X17IN

## (undated) DEVICE — TRAY NEURO OMC

## (undated) DEVICE — SET INTRO CHOLANGIO 4FR 60CM

## (undated) DEVICE — RESTRAINT WRIST NON LOCK BLUE

## (undated) DEVICE — DRAPE STERI INSTRUMENT 1018

## (undated) DEVICE — DRAPE OPMI STERILE

## (undated) DEVICE — DRAPE ABDOMINAL TIBURON 14X11

## (undated) DEVICE — SUT 0 VICRYL / UR6 (J603)

## (undated) DEVICE — TROCAR ENDOPATH XCEL 5MM 7.5CM

## (undated) DEVICE — SUT MCRYL PLUS 4-0 PS2 27IN